# Patient Record
Sex: MALE | Race: WHITE | NOT HISPANIC OR LATINO | Employment: FULL TIME | URBAN - METROPOLITAN AREA
[De-identification: names, ages, dates, MRNs, and addresses within clinical notes are randomized per-mention and may not be internally consistent; named-entity substitution may affect disease eponyms.]

---

## 2019-01-09 ENCOUNTER — TRANSCRIBE ORDERS (OUTPATIENT)
Dept: ADMINISTRATIVE | Facility: HOSPITAL | Age: 49
End: 2019-01-09

## 2019-01-09 DIAGNOSIS — R79.9 ABNORMAL BLOOD CHEMISTRY: Primary | ICD-10-CM

## 2020-09-09 ENCOUNTER — TRANSCRIBE ORDERS (OUTPATIENT)
Dept: ADMINISTRATIVE | Facility: HOSPITAL | Age: 50
End: 2020-09-09

## 2020-09-09 DIAGNOSIS — Z82.49 FAMILY HISTORY OF CHRONIC ISCHEMIC HEART DISEASE: Primary | ICD-10-CM

## 2020-09-09 DIAGNOSIS — E78.00 HIGH CHOLESTEROL: ICD-10-CM

## 2020-09-16 ENCOUNTER — HOSPITAL ENCOUNTER (OUTPATIENT)
Dept: RADIOLOGY | Facility: HOSPITAL | Age: 50
Discharge: HOME | End: 2020-09-16
Attending: SPECIALIST
Payer: COMMERCIAL

## 2020-09-16 DIAGNOSIS — Z82.49 FAMILY HISTORY OF CHRONIC ISCHEMIC HEART DISEASE: ICD-10-CM

## 2020-09-16 DIAGNOSIS — E78.00 HIGH CHOLESTEROL: ICD-10-CM

## 2020-12-02 ENCOUNTER — OFFICE VISIT (OUTPATIENT)
Dept: GASTROENTEROLOGY | Facility: AMBULARY SURGERY CENTER | Age: 50
End: 2020-12-02
Payer: COMMERCIAL

## 2020-12-02 VITALS — HEIGHT: 72 IN | WEIGHT: 271.6 LBS | BODY MASS INDEX: 36.79 KG/M2

## 2020-12-02 DIAGNOSIS — Z12.11 SCREEN FOR COLON CANCER: ICD-10-CM

## 2020-12-02 DIAGNOSIS — K22.89 ESOPHAGEAL THICKENING: Primary | ICD-10-CM

## 2020-12-02 PROCEDURE — 99244 OFF/OP CNSLTJ NEW/EST MOD 40: CPT | Performed by: INTERNAL MEDICINE

## 2020-12-02 RX ORDER — TERAZOSIN 2 MG/1
4 CAPSULE ORAL 2 TIMES DAILY
COMMUNITY
Start: 2020-11-28

## 2020-12-02 RX ORDER — PEN NEEDLE, DIABETIC 31 GX5/16"
NEEDLE, DISPOSABLE MISCELLANEOUS
COMMUNITY
Start: 2020-11-17

## 2020-12-02 RX ORDER — ATORVASTATIN CALCIUM 40 MG/1
TABLET, FILM COATED ORAL
COMMUNITY
Start: 2020-09-29

## 2020-12-02 RX ORDER — AMLODIPINE BESYLATE 5 MG/1
5 TABLET ORAL 2 TIMES DAILY
COMMUNITY
Start: 2020-09-20 | End: 2022-05-02 | Stop reason: HOSPADM

## 2020-12-02 RX ORDER — SODIUM, POTASSIUM,MAG SULFATES 17.5-3.13G
2 SOLUTION, RECONSTITUTED, ORAL ORAL SEE ADMIN INSTRUCTIONS
Qty: 2 BOTTLE | Refills: 0 | Status: SHIPPED | OUTPATIENT
Start: 2020-12-02 | End: 2021-02-15 | Stop reason: ALTCHOICE

## 2020-12-02 RX ORDER — INSULIN GLARGINE 100 [IU]/ML
INJECTION, SOLUTION SUBCUTANEOUS
COMMUNITY
Start: 2020-09-28

## 2020-12-02 RX ORDER — CHLORTHALIDONE 25 MG/1
50 TABLET ORAL DAILY
COMMUNITY
Start: 2020-11-17

## 2020-12-02 RX ORDER — RAMIPRIL 10 MG/1
CAPSULE ORAL
COMMUNITY
Start: 2020-09-04

## 2020-12-02 RX ORDER — ATENOLOL 100 MG/1
150 TABLET ORAL 2 TIMES DAILY
COMMUNITY
Start: 2020-09-22

## 2020-12-02 RX ORDER — CARVEDILOL 25 MG/1
TABLET ORAL
COMMUNITY
Start: 2020-11-01

## 2020-12-03 ENCOUNTER — TELEPHONE (OUTPATIENT)
Dept: GASTROENTEROLOGY | Facility: AMBULARY SURGERY CENTER | Age: 50
End: 2020-12-03

## 2021-01-22 ENCOUNTER — ANESTHESIA EVENT (OUTPATIENT)
Dept: GASTROENTEROLOGY | Facility: AMBULARY SURGERY CENTER | Age: 51
End: 2021-01-22

## 2021-02-04 ENCOUNTER — TELEPHONE (OUTPATIENT)
Dept: GASTROENTEROLOGY | Facility: AMBULARY SURGERY CENTER | Age: 51
End: 2021-02-04

## 2021-02-04 NOTE — TELEPHONE ENCOUNTER
Pt did not hold Pradaxa-per pt's wife   Pt is rescheduled from 2/5/2021 to 2/15/2021 at [facility] w/ dr Deborah Dumas for egd/colonoscopy

## 2021-02-04 NOTE — TELEPHONE ENCOUNTER
Lm for pt and pt's wife re: OK to hold bridgette Delgadillo? RN faxed office note from Dr Florencia Pallas from [facility]   Pt to call back to confirm the message

## 2021-02-04 NOTE — TELEPHONE ENCOUNTER
Spoke to Sandra/RN at Dr Jhonny Vera office  She will fax over office note  Re: Pradaxa hold   Pt has been rescheduled to 2/15/2021 w/ dr Scot Sarabia

## 2021-02-05 ENCOUNTER — ANESTHESIA (OUTPATIENT)
Dept: GASTROENTEROLOGY | Facility: AMBULARY SURGERY CENTER | Age: 51
End: 2021-02-05

## 2021-02-15 ENCOUNTER — HOSPITAL ENCOUNTER (OUTPATIENT)
Dept: GASTROENTEROLOGY | Facility: AMBULARY SURGERY CENTER | Age: 51
Setting detail: OUTPATIENT SURGERY
Discharge: HOME | End: 2021-02-15
Attending: INTERNAL MEDICINE | Admitting: INTERNAL MEDICINE
Payer: COMMERCIAL

## 2021-02-15 VITALS
HEART RATE: 56 BPM | DIASTOLIC BLOOD PRESSURE: 58 MMHG | TEMPERATURE: 96.8 F | RESPIRATION RATE: 18 BRPM | BODY MASS INDEX: 35.62 KG/M2 | OXYGEN SATURATION: 96 % | SYSTOLIC BLOOD PRESSURE: 114 MMHG | HEIGHT: 72 IN | WEIGHT: 263 LBS

## 2021-02-15 VITALS — HEART RATE: 57 BPM

## 2021-02-15 DIAGNOSIS — K29.00 ACUTE SUPERFICIAL GASTRITIS WITHOUT HEMORRHAGE: Primary | ICD-10-CM

## 2021-02-15 DIAGNOSIS — K22.89 ESOPHAGEAL THICKENING: ICD-10-CM

## 2021-02-15 DIAGNOSIS — Z12.11 SCREEN FOR COLON CANCER: ICD-10-CM

## 2021-02-15 PROBLEM — E11.9 DIABETES MELLITUS, TYPE 2 (HCC): Status: ACTIVE | Noted: 2021-02-15

## 2021-02-15 PROBLEM — I48.91 ATRIAL FIBRILLATION (HCC): Status: ACTIVE | Noted: 2021-02-15

## 2021-02-15 PROBLEM — G47.30 SLEEP APNEA: Status: ACTIVE | Noted: 2021-02-15

## 2021-02-15 PROBLEM — I49.9 DYSRHYTHMIAS: Status: ACTIVE | Noted: 2021-02-15

## 2021-02-15 PROBLEM — I10 HTN (HYPERTENSION): Status: ACTIVE | Noted: 2021-02-15

## 2021-02-15 PROCEDURE — 88305 TISSUE EXAM BY PATHOLOGIST: CPT | Performed by: PATHOLOGY

## 2021-02-15 PROCEDURE — 43239 EGD BIOPSY SINGLE/MULTIPLE: CPT | Performed by: INTERNAL MEDICINE

## 2021-02-15 PROCEDURE — 45380 COLONOSCOPY AND BIOPSY: CPT | Performed by: INTERNAL MEDICINE

## 2021-02-15 PROCEDURE — 45385 COLONOSCOPY W/LESION REMOVAL: CPT | Performed by: INTERNAL MEDICINE

## 2021-02-15 RX ORDER — PROPOFOL 10 MG/ML
INJECTION, EMULSION INTRAVENOUS AS NEEDED
Status: DISCONTINUED | OUTPATIENT
Start: 2021-02-15 | End: 2021-02-15

## 2021-02-15 RX ORDER — SODIUM CHLORIDE, SODIUM LACTATE, POTASSIUM CHLORIDE, CALCIUM CHLORIDE 600; 310; 30; 20 MG/100ML; MG/100ML; MG/100ML; MG/100ML
125 INJECTION, SOLUTION INTRAVENOUS CONTINUOUS
Status: DISCONTINUED | OUTPATIENT
Start: 2021-02-15 | End: 2021-02-19 | Stop reason: HOSPADM

## 2021-02-15 RX ORDER — ONDANSETRON 2 MG/ML
4 INJECTION INTRAMUSCULAR; INTRAVENOUS ONCE AS NEEDED
Status: CANCELLED | OUTPATIENT
Start: 2021-02-15

## 2021-02-15 RX ORDER — PANTOPRAZOLE SODIUM 40 MG/1
40 TABLET, DELAYED RELEASE ORAL DAILY
Qty: 30 TABLET | Refills: 3 | Status: SHIPPED | OUTPATIENT
Start: 2021-02-15 | End: 2021-02-16

## 2021-02-15 RX ADMIN — PROPOFOL 50 MG: 10 INJECTION, EMULSION INTRAVENOUS at 08:13

## 2021-02-15 RX ADMIN — PROPOFOL 50 MG: 10 INJECTION, EMULSION INTRAVENOUS at 08:16

## 2021-02-15 RX ADMIN — SODIUM CHLORIDE, SODIUM LACTATE, POTASSIUM CHLORIDE, AND CALCIUM CHLORIDE: .6; .31; .03; .02 INJECTION, SOLUTION INTRAVENOUS at 08:03

## 2021-02-15 RX ADMIN — PROPOFOL 50 MG: 10 INJECTION, EMULSION INTRAVENOUS at 08:18

## 2021-02-15 RX ADMIN — PROPOFOL 50 MG: 10 INJECTION, EMULSION INTRAVENOUS at 08:15

## 2021-02-15 RX ADMIN — PROPOFOL 50 MG: 10 INJECTION, EMULSION INTRAVENOUS at 08:07

## 2021-02-15 RX ADMIN — SODIUM CHLORIDE, SODIUM LACTATE, POTASSIUM CHLORIDE, AND CALCIUM CHLORIDE 125 ML/HR: .6; .31; .03; .02 INJECTION, SOLUTION INTRAVENOUS at 07:47

## 2021-02-15 RX ADMIN — PROPOFOL 50 MG: 10 INJECTION, EMULSION INTRAVENOUS at 08:19

## 2021-02-15 RX ADMIN — PROPOFOL 50 MG: 10 INJECTION, EMULSION INTRAVENOUS at 08:08

## 2021-02-15 RX ADMIN — PROPOFOL 150 MG: 10 INJECTION, EMULSION INTRAVENOUS at 08:06

## 2021-02-15 RX ADMIN — PROPOFOL 50 MG: 10 INJECTION, EMULSION INTRAVENOUS at 08:17

## 2021-02-15 RX ADMIN — PROPOFOL 50 MG: 10 INJECTION, EMULSION INTRAVENOUS at 08:10

## 2021-02-15 RX ADMIN — PROPOFOL 50 MG: 10 INJECTION, EMULSION INTRAVENOUS at 08:14

## 2021-02-15 RX ADMIN — PROPOFOL 50 MG: 10 INJECTION, EMULSION INTRAVENOUS at 08:12

## 2021-02-15 RX ADMIN — PROPOFOL 50 MG: 10 INJECTION, EMULSION INTRAVENOUS at 08:11

## 2021-02-15 RX ADMIN — PROPOFOL 50 MG: 10 INJECTION, EMULSION INTRAVENOUS at 08:09

## 2021-02-15 NOTE — H&P
History and Physical -  [facility]  Concepción Castorena 44 y o  male MRN: [id number]        HPI: 28-year-old male with history of diabetes mellitus, atrial fibrillation was noted to have esophageal thickening on the CT scan  Regular bowel movements  Historical Information   Past Medical History:   Diagnosis Date    [facility]fib ([facility] )     Diabetes mellitus ([facility] )     Hypertension      Past Surgical History:   Procedure Laterality Date    CARDIAC ELECTROPHYSIOLOGY STUDY AND ABLATION      CARDIOVERSION      NOSE SURGERY       Social History   Social History     Substance and Sexual Activity   Alcohol Use Yes    Comment: Rare     Social History     Substance and Sexual Activity   Drug Use Never     Social History     Tobacco Use   Smoking Status Never Smoker   Smokeless Tobacco Former User    Types: Chew     Family History   Problem Relation Age of Onset    Lung cancer Mother        Meds/Allergies     (Not in a hospital admission)      Allergies   Allergen Reactions    Penicillins Other (See Comments)     pt don't know      Sulfa Antibiotics Other (See Comments)     pt don't know       Objective     Blood pressure 132/85, pulse 56, temperature (!) 96 4 °F (35 8 °C), temperature source Temporal, resp  rate 18, height 6' (1 829 m), weight 119 kg (263 lb), SpO2 97 %      PHYSICAL EXAM:    Gen: NAD  CV: S1 & S2 normal, RRR  CHEST: Clear to auscultate  ABD: soft, NT/ND, good bowel sounds  EXT: no edema    ASSESSMENT:     Abnormal esophagus on the CT scan, screening for colon cancer    PLAN:    EGD and colonoscopy

## 2021-02-15 NOTE — ANESTHESIA PREPROCEDURE EVALUATION
Procedure:  COLONOSCOPY  EGD    Relevant Problems   ANESTHESIA (within normal limits)      CARDIO   (+) Atrial fibrillation (HCC)   (+) Dysrhythmias   (+) HTN (hypertension)      ENDO   (+) Diabetes mellitus, type 2 (HCC)      PULMONARY   (+) Sleep apnea        Physical Exam    Airway    Mallampati score: I  TM Distance: >3 FB  Neck ROM: full     Dental   No notable dental hx     Cardiovascular  Rate: normal,     Pulmonary  Breath sounds clear to auscultation,     Other Findings        Anesthesia Plan  ASA Score- 3     Anesthesia Type- IV sedation with anesthesia with ASA Monitors  Additional Monitors:   Airway Plan:           Plan Factors-Exercise tolerance (METS): >4 METS  Chart reviewed  Existing labs reviewed  Patient summary reviewed  Patient is not a current smoker  Induction- intravenous  Postoperative Plan-     Informed Consent- Anesthetic plan and risks discussed with patient  I personally reviewed this patient with the CRNA  Discussed and agreed on the Anesthesia Plan with the CRNA  Ross Snyder

## 2021-02-15 NOTE — ANESTHESIA POSTPROCEDURE EVALUATION
Post-Op Assessment Note    CV Status:  Stable    Pain management: adequate     Mental Status:  Sleepy   Hydration Status:  Euvolemic   PONV Controlled:  Controlled   Airway Patency:  Patent      Post Op Vitals Reviewed: Yes      Staff: Anesthesiologist, CRNA         No complications documented      BP   97/54   Temp (!) 96 8 °F (36 °C) (02/15/21 0827)    Pulse (!) 53 (02/15/21 0827)   Resp 18 (02/15/21 0827)    SpO2 98 % (02/15/21 0827)

## 2021-02-16 DIAGNOSIS — K29.00 ACUTE SUPERFICIAL GASTRITIS WITHOUT HEMORRHAGE: ICD-10-CM

## 2021-02-16 RX ORDER — PANTOPRAZOLE SODIUM 40 MG/1
TABLET, DELAYED RELEASE ORAL
Qty: 90 TABLET | Refills: 1 | Status: SHIPPED | OUTPATIENT
Start: 2021-02-16 | End: 2021-08-17

## 2021-08-17 DIAGNOSIS — K29.00 ACUTE SUPERFICIAL GASTRITIS WITHOUT HEMORRHAGE: ICD-10-CM

## 2021-08-17 RX ORDER — PANTOPRAZOLE SODIUM 40 MG/1
TABLET, DELAYED RELEASE ORAL
Qty: 90 TABLET | Refills: 1 | Status: ON HOLD | OUTPATIENT
Start: 2021-08-17 | End: 2022-04-08 | Stop reason: ALTCHOICE

## 2021-11-15 ENCOUNTER — OFFICE VISIT (OUTPATIENT)
Dept: PODIATRY | Facility: CLINIC | Age: 51
End: 2021-11-15
Payer: COMMERCIAL

## 2021-11-15 VITALS
WEIGHT: 270 LBS | DIASTOLIC BLOOD PRESSURE: 82 MMHG | BODY MASS INDEX: 36.57 KG/M2 | HEIGHT: 72 IN | SYSTOLIC BLOOD PRESSURE: 135 MMHG

## 2021-11-15 DIAGNOSIS — Z79.4 TYPE 2 DIABETES MELLITUS WITH DIABETIC NEUROPATHY, WITH LONG-TERM CURRENT USE OF INSULIN (HCC): ICD-10-CM

## 2021-11-15 DIAGNOSIS — L97.522 ULCER OF LEFT FOOT, WITH FAT LAYER EXPOSED (HCC): Primary | ICD-10-CM

## 2021-11-15 DIAGNOSIS — E11.40 TYPE 2 DIABETES MELLITUS WITH DIABETIC NEUROPATHY, WITH LONG-TERM CURRENT USE OF INSULIN (HCC): ICD-10-CM

## 2021-11-15 PROCEDURE — 11042 DBRDMT SUBQ TIS 1ST 20SQCM/<: CPT | Performed by: PODIATRIST

## 2021-11-15 PROCEDURE — 99203 OFFICE O/P NEW LOW 30 MIN: CPT | Performed by: PODIATRIST

## 2021-11-15 RX ORDER — POTASSIUM CHLORIDE 20 MEQ/1
TABLET, EXTENDED RELEASE ORAL AS NEEDED
COMMUNITY
Start: 2021-11-02

## 2021-11-15 RX ORDER — FUROSEMIDE 40 MG/1
TABLET ORAL AS NEEDED
COMMUNITY
Start: 2021-11-02

## 2021-11-23 ENCOUNTER — OFFICE VISIT (OUTPATIENT)
Dept: PODIATRY | Facility: CLINIC | Age: 51
End: 2021-11-23
Payer: COMMERCIAL

## 2021-11-23 VITALS
BODY MASS INDEX: 36.57 KG/M2 | SYSTOLIC BLOOD PRESSURE: 131 MMHG | WEIGHT: 270 LBS | HEIGHT: 72 IN | HEART RATE: 67 BPM | DIASTOLIC BLOOD PRESSURE: 84 MMHG

## 2021-11-23 DIAGNOSIS — Z79.4 TYPE 2 DIABETES MELLITUS WITH DIABETIC NEUROPATHY, WITH LONG-TERM CURRENT USE OF INSULIN (HCC): ICD-10-CM

## 2021-11-23 DIAGNOSIS — E11.40 TYPE 2 DIABETES MELLITUS WITH DIABETIC NEUROPATHY, WITH LONG-TERM CURRENT USE OF INSULIN (HCC): ICD-10-CM

## 2021-11-23 DIAGNOSIS — L97.522 ULCER OF LEFT FOOT, WITH FAT LAYER EXPOSED (HCC): Primary | ICD-10-CM

## 2021-11-23 PROCEDURE — 11042 DBRDMT SUBQ TIS 1ST 20SQCM/<: CPT | Performed by: PODIATRIST

## 2021-11-23 RX ORDER — FLUTICASONE PROPIONATE 50 MCG
2 SPRAY, SUSPENSION (ML) NASAL AS NEEDED
COMMUNITY

## 2021-11-23 RX ORDER — INSULIN LISPRO-AABC 100 [IU]/ML
INJECTION, SOLUTION SUBCUTANEOUS AS NEEDED
COMMUNITY
Start: 2021-11-19

## 2021-12-02 ENCOUNTER — HOSPITAL ENCOUNTER (OUTPATIENT)
Dept: RADIOLOGY | Facility: HOSPITAL | Age: 51
Discharge: HOME | End: 2021-12-02
Attending: PODIATRIST
Payer: COMMERCIAL

## 2021-12-02 ENCOUNTER — OFFICE VISIT (OUTPATIENT)
Dept: WOUND CARE | Facility: HOSPITAL | Age: 51
End: 2021-12-02
Payer: COMMERCIAL

## 2021-12-02 VITALS
DIASTOLIC BLOOD PRESSURE: 66 MMHG | TEMPERATURE: 96.5 F | BODY MASS INDEX: 35.89 KG/M2 | SYSTOLIC BLOOD PRESSURE: 121 MMHG | HEART RATE: 66 BPM | HEIGHT: 72 IN | WEIGHT: 265 LBS | RESPIRATION RATE: 16 BRPM

## 2021-12-02 DIAGNOSIS — L97.522 ULCER OF LEFT FOOT, WITH FAT LAYER EXPOSED (HCC): Primary | ICD-10-CM

## 2021-12-02 DIAGNOSIS — L97.522 ULCER OF LEFT FOOT, WITH FAT LAYER EXPOSED (HCC): ICD-10-CM

## 2021-12-02 DIAGNOSIS — Z79.4 TYPE 2 DIABETES MELLITUS WITH DIABETIC NEUROPATHY, WITH LONG-TERM CURRENT USE OF INSULIN (HCC): ICD-10-CM

## 2021-12-02 DIAGNOSIS — E11.40 TYPE 2 DIABETES MELLITUS WITH DIABETIC NEUROPATHY, WITH LONG-TERM CURRENT USE OF INSULIN (HCC): ICD-10-CM

## 2021-12-02 PROCEDURE — 99213 OFFICE O/P EST LOW 20 MIN: CPT | Performed by: PODIATRIST

## 2021-12-02 PROCEDURE — 11042 DBRDMT SUBQ TIS 1ST 20SQCM/<: CPT | Performed by: PODIATRIST

## 2021-12-02 PROCEDURE — G0463 HOSPITAL OUTPT CLINIC VISIT: HCPCS | Performed by: PODIATRIST

## 2021-12-02 PROCEDURE — 73630 X-RAY EXAM OF FOOT: CPT

## 2021-12-02 PROCEDURE — 29445 APPL RIGID TOT CNTC LEG CAST: CPT | Performed by: PODIATRIST

## 2021-12-06 ENCOUNTER — OFFICE VISIT (OUTPATIENT)
Dept: WOUND CARE | Facility: HOSPITAL | Age: 51
End: 2021-12-06
Payer: COMMERCIAL

## 2021-12-06 VITALS
TEMPERATURE: 97.1 F | DIASTOLIC BLOOD PRESSURE: 82 MMHG | HEART RATE: 65 BPM | SYSTOLIC BLOOD PRESSURE: 125 MMHG | RESPIRATION RATE: 18 BRPM

## 2021-12-06 DIAGNOSIS — E11.40 TYPE 2 DIABETES MELLITUS WITH DIABETIC NEUROPATHY, WITH LONG-TERM CURRENT USE OF INSULIN (HCC): ICD-10-CM

## 2021-12-06 DIAGNOSIS — Z79.4 TYPE 2 DIABETES MELLITUS WITH DIABETIC NEUROPATHY, WITH LONG-TERM CURRENT USE OF INSULIN (HCC): ICD-10-CM

## 2021-12-06 DIAGNOSIS — L97.522 ULCER OF LEFT FOOT, WITH FAT LAYER EXPOSED (HCC): Primary | ICD-10-CM

## 2021-12-06 PROCEDURE — 29445 APPL RIGID TOT CNTC LEG CAST: CPT | Performed by: PODIATRIST

## 2021-12-06 PROCEDURE — 99213 OFFICE O/P EST LOW 20 MIN: CPT | Performed by: PODIATRIST

## 2021-12-09 ENCOUNTER — OFFICE VISIT (OUTPATIENT)
Dept: WOUND CARE | Facility: HOSPITAL | Age: 51
End: 2021-12-09
Payer: COMMERCIAL

## 2021-12-09 VITALS
HEART RATE: 64 BPM | RESPIRATION RATE: 16 BRPM | SYSTOLIC BLOOD PRESSURE: 120 MMHG | DIASTOLIC BLOOD PRESSURE: 76 MMHG | TEMPERATURE: 97 F

## 2021-12-09 DIAGNOSIS — Z79.4 TYPE 2 DIABETES MELLITUS WITH DIABETIC NEUROPATHY, WITH LONG-TERM CURRENT USE OF INSULIN (HCC): ICD-10-CM

## 2021-12-09 DIAGNOSIS — L97.522 ULCER OF LEFT FOOT, WITH FAT LAYER EXPOSED (HCC): Primary | ICD-10-CM

## 2021-12-09 DIAGNOSIS — E11.40 TYPE 2 DIABETES MELLITUS WITH DIABETIC NEUROPATHY, WITH LONG-TERM CURRENT USE OF INSULIN (HCC): ICD-10-CM

## 2021-12-09 PROCEDURE — 29445 APPL RIGID TOT CNTC LEG CAST: CPT | Performed by: PODIATRIST

## 2021-12-16 ENCOUNTER — OFFICE VISIT (OUTPATIENT)
Dept: WOUND CARE | Facility: HOSPITAL | Age: 51
End: 2021-12-16
Payer: COMMERCIAL

## 2021-12-16 VITALS
HEART RATE: 58 BPM | RESPIRATION RATE: 16 BRPM | TEMPERATURE: 97 F | SYSTOLIC BLOOD PRESSURE: 136 MMHG | DIASTOLIC BLOOD PRESSURE: 77 MMHG

## 2021-12-16 DIAGNOSIS — E11.40 TYPE 2 DIABETES MELLITUS WITH DIABETIC NEUROPATHY, WITH LONG-TERM CURRENT USE OF INSULIN (HCC): ICD-10-CM

## 2021-12-16 DIAGNOSIS — L97.522 ULCER OF LEFT FOOT, WITH FAT LAYER EXPOSED (HCC): Primary | ICD-10-CM

## 2021-12-16 DIAGNOSIS — Z79.4 TYPE 2 DIABETES MELLITUS WITH DIABETIC NEUROPATHY, WITH LONG-TERM CURRENT USE OF INSULIN (HCC): ICD-10-CM

## 2021-12-16 PROCEDURE — G0463 HOSPITAL OUTPT CLINIC VISIT: HCPCS | Performed by: PODIATRIST

## 2021-12-16 PROCEDURE — 99212 OFFICE O/P EST SF 10 MIN: CPT | Performed by: PODIATRIST

## 2021-12-16 PROCEDURE — 99213 OFFICE O/P EST LOW 20 MIN: CPT | Performed by: PODIATRIST

## 2021-12-20 ENCOUNTER — TELEPHONE (OUTPATIENT)
Dept: WOUND CARE | Facility: HOSPITAL | Age: 51
End: 2021-12-20

## 2021-12-30 ENCOUNTER — OFFICE VISIT (OUTPATIENT)
Dept: WOUND CARE | Facility: HOSPITAL | Age: 51
End: 2021-12-30
Payer: COMMERCIAL

## 2021-12-30 VITALS
RESPIRATION RATE: 16 BRPM | DIASTOLIC BLOOD PRESSURE: 78 MMHG | SYSTOLIC BLOOD PRESSURE: 124 MMHG | HEART RATE: 80 BPM | TEMPERATURE: 96.8 F

## 2021-12-30 DIAGNOSIS — E11.40 TYPE 2 DIABETES MELLITUS WITH DIABETIC NEUROPATHY, WITH LONG-TERM CURRENT USE OF INSULIN (HCC): ICD-10-CM

## 2021-12-30 DIAGNOSIS — Z79.4 TYPE 2 DIABETES MELLITUS WITH DIABETIC NEUROPATHY, WITH LONG-TERM CURRENT USE OF INSULIN (HCC): ICD-10-CM

## 2021-12-30 DIAGNOSIS — L97.522 ULCER OF LEFT FOOT, WITH FAT LAYER EXPOSED (HCC): Primary | ICD-10-CM

## 2021-12-30 PROCEDURE — G0463 HOSPITAL OUTPT CLINIC VISIT: HCPCS | Performed by: PODIATRIST

## 2021-12-30 PROCEDURE — 99213 OFFICE O/P EST LOW 20 MIN: CPT | Performed by: PODIATRIST

## 2021-12-30 PROCEDURE — 99212 OFFICE O/P EST SF 10 MIN: CPT | Performed by: PODIATRIST

## 2022-01-05 ENCOUNTER — OFFICE VISIT (OUTPATIENT)
Dept: PODIATRY | Facility: CLINIC | Age: 52
End: 2022-01-05
Payer: COMMERCIAL

## 2022-01-05 VITALS
SYSTOLIC BLOOD PRESSURE: 161 MMHG | HEART RATE: 62 BPM | WEIGHT: 271 LBS | DIASTOLIC BLOOD PRESSURE: 81 MMHG | BODY MASS INDEX: 36.7 KG/M2 | HEIGHT: 72 IN

## 2022-01-05 DIAGNOSIS — L97.522 ULCER OF LEFT FOOT, WITH FAT LAYER EXPOSED (HCC): Primary | ICD-10-CM

## 2022-01-05 DIAGNOSIS — E11.40 TYPE 2 DIABETES MELLITUS WITH DIABETIC NEUROPATHY, WITH LONG-TERM CURRENT USE OF INSULIN (HCC): ICD-10-CM

## 2022-01-05 DIAGNOSIS — Z79.4 TYPE 2 DIABETES MELLITUS WITH DIABETIC NEUROPATHY, WITH LONG-TERM CURRENT USE OF INSULIN (HCC): ICD-10-CM

## 2022-01-05 PROCEDURE — 99213 OFFICE O/P EST LOW 20 MIN: CPT | Performed by: PODIATRIST

## 2022-01-05 NOTE — PROGRESS NOTES
Patient ID: Dianne Luu is a 46 y o  male Date of Birth 1970     Chief Complaint  Chief Complaint   Patient presents with    Follow-up       Allergies  Penicillins and Sulfa antibiotics    Assessment:    No problem-specific Assessment & Plan notes found for this encounter  Diagnoses and all orders for this visit:    Ulcer of left foot, with fat layer exposed ([facility] )    Type 2 diabetes mellitus with diabetic neuropathy, with long-term current use of insulin ([facility] )          Plan:  1  Previous wound care note was reviewed  2  Wound remains healed  Tolerates new DM shoes  3  Instructed skin care and protection  Foot exam bid  4  Okay to return to work on 1/17  He is not allowed to have overtime at this time  5  RA in 2 weeks  Subjective:        HPI  Ed presents for left foot evaluation  He was discharged from wound center  No recurring ulcer  He presents with new DM shoes  Insert was adjusted today because it was rubbing outside left foot  No pain left foot  BS has been better          The following portions of the patient's history were reviewed and updated as appropriate: allergies, current medications, past family history, past medical history, past social history, past surgical history and problem list     PAST MEDICAL HISTORY:  Past Medical History:   Diagnosis Date    A-fib ([place] [address] )     Diabetes mellitus ([place] [address] )     Hypertension        PAST SURGICAL HISTORY:  Past Surgical History:   Procedure Laterality Date    CARDIAC ELECTROPHYSIOLOGY STUDY AND ABLATION      CARDIOVERSION      CATARACT EXTRACTION Bilateral 07/2021    NOSE SURGERY          ALLERGIES:  Penicillins and Sulfa antibiotics    MEDICATIONS:  Current Outpatient Medications   Medication Sig Dispense Refill    amLODIPine (NORVASC) 5 mg tablet Take 5 mg by mouth 2 (two) times a day      atorvastatin (LIPITOR) 40 mg tablet TAKE 1 TABLET BY MOUTH EVERY DAY AT NIGHT      B-D ULTRAFINE III SHORT PEN 31G X 8 MM MISC USE FOR 2 INJECTIONS PER DAY      carvedilol (COREG) 25 mg tablet TAKE 2 TABLETS (50 MG TOTAL) BY MOUTH 2 (TWO) TIMES A DAY WITH MEALS   chlorthalidone 25 mg tablet Take 50 mg by mouth daily      fluticasone (FLONASE) 50 mcg/act nasal spray As directed      furosemide (LASIX) 40 mg tablet as needed      Lantus SoloStar 100 units/mL injection pen INJECT 45 UNTIS IN THE MORNING AND 80 UNTITS AT NIGHT      Lyumjev KwikPen 100 UNIT/ML SOPN       metFORMIN (GLUCOPHAGE) 1000 MG tablet Take 1,000 mg by mouth 2 (two) times a day      potassium chloride (K-DUR,KLOR-CON) 20 mEq tablet as needed      Pradaxa 150 MG capsu Take 150 mg by mouth 2 (two) times a day      ramipril (ALTACE) 10 MG capsule TAKE 2 TABLETS (20 MG TOTAL) IN THE MORNING AND TAKE 1 TABLET (10 MG TOTAL) IN THE EVENING   terazosin (HYTRIN) 2 mg capsule       pantoprazole (PROTONIX) 40 mg tablet TAKE 1 TABLET BY MOUTH EVERY DAY (Patient not taking: Reported on 11/15/2021) 90 tablet 1     No current facility-administered medications for this visit         SOCIAL HISTORY:  Social History     Socioeconomic History    Marital status: /Civil Union     Spouse name: None    Number of children: None    Years of education: None    Highest education level: None   Occupational History    None   Tobacco Use    Smoking status: Never Smoker    Smokeless tobacco: Former User     Types: Chew   Vaping Use    Vaping Use: Never used   Substance and Sexual Activity    Alcohol use: Yes     Comment: Rare    Drug use: Never    Sexual activity: None   Other Topics Concern    None   Social History Narrative    None     Social Determinants of Health     Financial Resource Strain: Not on file   Food Insecurity: Not on file   Transportation Needs: Not on file   Physical Activity: Not on file   Stress: Not on file   Social Connections: Not on file   Intimate Partner Violence: Not on file   Housing Stability: Not on file      Review of Systems Constitutional: Negative for appetite change, chills and fever  Respiratory: Negative for cough and shortness of breath  Cardiovascular: Negative for chest pain  Gastrointestinal: Negative for diarrhea, nausea and vomiting  Neurological: Positive for numbness  Objective:            /81   Pulse 62   Ht 6' (1 829 m) Comment: verbal  Wt 123 kg (271 lb)   BMI 36 75 kg/m²     Physical Exam  Vitals and nursing note reviewed  Constitutional:       General: He is not in acute distress  Appearance: He is obese  He is not ill-appearing  Cardiovascular:      Rate and Rhythm: Normal rate and regular rhythm  Pulses:           Dorsalis pedis pulses are 2+ on the right side and 2+ on the left side  Posterior tibial pulses are 1+ on the right side and 1+ on the left side  Pulmonary:      Effort: Pulmonary effort is normal    Musculoskeletal:         General: No tenderness or signs of injury  Right lower leg: No edema  Left lower leg: No edema  Right foot: No Charcot foot or foot drop  Left foot: No Charcot foot or foot drop  Skin:     General: Skin is warm  Capillary Refill: Capillary refill takes less than 2 seconds  Coloration: Skin is not cyanotic or mottled  Findings: No abscess, ecchymosis or erythema  Nails: There is no clubbing  Comments: Wound remains healed left submet 5  No keratosis  No drainage  No redness  No abscess  No cellulitis  Neurological:      General: No focal deficit present  Mental Status: He is alert and oriented to person, place, and time  Cranial Nerves: No cranial nerve deficit  Motor: No weakness  Coordination: Coordination normal    Psychiatric:         Mood and Affect: Mood normal          Behavior: Behavior normal          Thought Content:  Thought content normal          Judgment: Judgment normal

## 2022-01-06 ENCOUNTER — TELEPHONE (OUTPATIENT)
Dept: PODIATRY | Facility: CLINIC | Age: 52
End: 2022-01-06

## 2022-01-06 NOTE — TELEPHONE ENCOUNTER
Ciaran called, Hector Corral said he cannot return to work with any restrictions  I wrote a new letter for Ed and took out the no overtime  This will be emailed to [email or web address]

## 2022-01-20 ENCOUNTER — OFFICE VISIT (OUTPATIENT)
Dept: PODIATRY | Facility: CLINIC | Age: 52
End: 2022-01-20
Payer: COMMERCIAL

## 2022-01-20 VITALS
DIASTOLIC BLOOD PRESSURE: 93 MMHG | BODY MASS INDEX: 36.75 KG/M2 | HEIGHT: 72 IN | HEART RATE: 102 BPM | SYSTOLIC BLOOD PRESSURE: 149 MMHG

## 2022-01-20 DIAGNOSIS — E11.40 TYPE 2 DIABETES MELLITUS WITH DIABETIC NEUROPATHY, WITH LONG-TERM CURRENT USE OF INSULIN (HCC): Primary | ICD-10-CM

## 2022-01-20 DIAGNOSIS — Z79.4 TYPE 2 DIABETES MELLITUS WITH DIABETIC NEUROPATHY, WITH LONG-TERM CURRENT USE OF INSULIN (HCC): Primary | ICD-10-CM

## 2022-01-20 PROCEDURE — 99213 OFFICE O/P EST LOW 20 MIN: CPT | Performed by: PODIATRIST

## 2022-01-20 NOTE — PROGRESS NOTES
Patient ID: Jaci De Leon is a 46 y o  male Date of Birth 1970     Chief Complaint  Chief Complaint   Patient presents with    Follow Up Wound Care Visit       Allergies  Penicillins and Sulfa antibiotics    Assessment:    No problem-specific Assessment & Plan notes found for this encounter  Diagnoses and all orders for this visit:    Type 2 diabetes mellitus with diabetic neuropathy, with long-term current use of insulin ([place] [address] )            Plan:  1  Previous note was reviewed  Wound remains healed  Tolerates new DM shoes  2  Educated disease prevention and risks related to diabetes  Educated proper daily foot care and exam   Instructed proper skin care / protection and footwear  Instructed to identify any signs of infection and related foot problem  Discussed proper blood glucose control  3  Instructed skin care and protection  Foot exam bid  4  RA in 3 weeks  Subjective:      HPI  Ed presents for left foot evaluation  No recurring ulcer  He presents with DM shoes  No pain left foot  No redness or edema  BS has been better          The following portions of the patient's history were reviewed and updated as appropriate: allergies, current medications, past family history, past medical history, past social history, past surgical history and problem list     PAST MEDICAL HISTORY:  Past Medical History:   Diagnosis Date    A-fib ([place] [address] )     Diabetes mellitus ([place] [address] )     Hypertension        PAST SURGICAL HISTORY:  Past Surgical History:   Procedure Laterality Date    CARDIAC ELECTROPHYSIOLOGY STUDY AND ABLATION      CARDIOVERSION      CATARACT EXTRACTION Bilateral 07/2021    NOSE SURGERY          ALLERGIES:  Penicillins and Sulfa antibiotics    MEDICATIONS:  Current Outpatient Medications   Medication Sig Dispense Refill    amLODIPine (NORVASC) 5 mg tablet Take 5 mg by mouth 2 (two) times a day      atorvastatin (LIPITOR) 40 mg tablet TAKE 1 TABLET BY MOUTH EVERY DAY AT NIGHT  B-D ULTRAFINE III SHORT PEN 31G X 8 MM MISC USE FOR 2 INJECTIONS PER DAY      carvedilol (COREG) 25 mg tablet TAKE 2 TABLETS (50 MG TOTAL) BY MOUTH 2 (TWO) TIMES A DAY WITH MEALS   chlorthalidone 25 mg tablet Take 50 mg by mouth daily      fluticasone (FLONASE) 50 mcg/act nasal spray As directed      furosemide (LASIX) 40 mg tablet as needed      Lantus SoloStar 100 units/mL injection pen INJECT 45 UNTIS IN THE MORNING AND 80 UNTITS AT NIGHT      Lyumjev KwikPen 100 UNIT/ML SOPN       metFORMIN (GLUCOPHAGE) 1000 MG tablet Take 1,000 mg by mouth 2 (two) times a day      potassium chloride (K-DUR,KLOR-CON) 20 mEq tablet as needed      Pradaxa 150 MG capsu Take 150 mg by mouth 2 (two) times a day      ramipril (ALTACE) 10 MG capsule TAKE 2 TABLETS (20 MG TOTAL) IN THE MORNING AND TAKE 1 TABLET (10 MG TOTAL) IN THE EVENING   terazosin (HYTRIN) 2 mg capsule       pantoprazole (PROTONIX) 40 mg tablet TAKE 1 TABLET BY MOUTH EVERY DAY (Patient not taking: Reported on 11/15/2021) 90 tablet 1     No current facility-administered medications for this visit         SOCIAL HISTORY:  Social History     Socioeconomic History    Marital status: /Civil Union     Spouse name: None    Number of children: None    Years of education: None    Highest education level: None   Occupational History    None   Tobacco Use    Smoking status: Never Smoker    Smokeless tobacco: Former User     Types: Chew   Vaping Use    Vaping Use: Never used   Substance and Sexual Activity    Alcohol use: Yes     Comment: Rare    Drug use: Never    Sexual activity: None   Other Topics Concern    None   Social History Narrative    None     Social Determinants of Health     Financial Resource Strain: Not on file   Food Insecurity: Not on file   Transportation Needs: Not on file   Physical Activity: Not on file   Stress: Not on file   Social Connections: Not on file   Intimate Partner Violence: Not on file   Housing Stability: Not on file      Review of Systems   Constitutional: Negative for appetite change, chills and fever  Respiratory: Negative for cough and shortness of breath  Cardiovascular: Negative for chest pain  Gastrointestinal: Negative for diarrhea, nausea and vomiting  Neurological: Positive for numbness  Objective:            /93   Pulse 102   Ht 6' (1 829 m) Comment: verbal  BMI 36 75 kg/m²     Physical Exam  Vitals and nursing note reviewed  Constitutional:       General: He is not in acute distress  Appearance: He is obese  He is not ill-appearing  Cardiovascular:      Rate and Rhythm: Normal rate and regular rhythm  Pulses:           Dorsalis pedis pulses are 2+ on the right side and 2+ on the left side  Posterior tibial pulses are 1+ on the right side and 1+ on the left side  Pulmonary:      Effort: Pulmonary effort is normal    Musculoskeletal:         General: No tenderness or signs of injury  Right lower leg: No edema  Left lower leg: No edema  Right foot: No Charcot foot or foot drop  Left foot: No Charcot foot or foot drop  Skin:     General: Skin is warm  Capillary Refill: Capillary refill takes less than 2 seconds  Coloration: Skin is not cyanotic or mottled  Findings: No abscess, ecchymosis or erythema  Nails: There is no clubbing  Comments: Wound remains healed left submet 5  No keratosis  No drainage  No redness  No abscess  No cellulitis  Neurological:      General: No focal deficit present  Mental Status: He is alert and oriented to person, place, and time  Cranial Nerves: No cranial nerve deficit  Motor: No weakness  Coordination: Coordination normal    Psychiatric:         Mood and Affect: Mood normal          Behavior: Behavior normal          Thought Content:  Thought content normal          Judgment: Judgment normal

## 2022-02-10 ENCOUNTER — OFFICE VISIT (OUTPATIENT)
Dept: PODIATRY | Facility: CLINIC | Age: 52
End: 2022-02-10
Payer: COMMERCIAL

## 2022-02-10 VITALS
DIASTOLIC BLOOD PRESSURE: 83 MMHG | HEART RATE: 67 BPM | WEIGHT: 271 LBS | BODY MASS INDEX: 36.7 KG/M2 | HEIGHT: 72 IN | SYSTOLIC BLOOD PRESSURE: 135 MMHG

## 2022-02-10 DIAGNOSIS — E11.40 TYPE 2 DIABETES MELLITUS WITH DIABETIC NEUROPATHY, WITH LONG-TERM CURRENT USE OF INSULIN (HCC): Primary | ICD-10-CM

## 2022-02-10 DIAGNOSIS — Z79.4 TYPE 2 DIABETES MELLITUS WITH DIABETIC NEUROPATHY, WITH LONG-TERM CURRENT USE OF INSULIN (HCC): Primary | ICD-10-CM

## 2022-02-10 PROCEDURE — 99213 OFFICE O/P EST LOW 20 MIN: CPT | Performed by: PODIATRIST

## 2022-02-10 NOTE — PROGRESS NOTES
Patient ID: Amadeo Isabel is a 46 y o  male Date of Birth 1970     Chief Complaint  Chief Complaint   Patient presents with    Follow-up       Allergies  Penicillins and Sulfa antibiotics    Assessment:    No problem-specific Assessment & Plan notes found for this encounter  Diagnoses and all orders for this visit:    Type 2 diabetes mellitus with diabetic neuropathy, with long-term current use of insulin ([facility] )          Plan:  1  Previous note was reviewed  Wound remains healed  2  Educated disease prevention and risks related to diabetes  Educated proper daily foot care and exam   Instructed proper skin care / protection and footwear  Instructed to identify any signs of infection and related foot problem  Discussed proper blood glucose control  3  Mild keratosis removed  Instructed skin care and protection  Foot exam bid  Continue DM shoes  Discussed possible 5th met head resection depending on the progress  4  RA in 4 weeks  Subjective:      HPI  Ed presents for left foot evaluation  No recurring ulcer  He presents with DM shoes  No pain left foot  No redness or edema  BS is under control  New HbA1c is 6 8            The following portions of the patient's history were reviewed and updated as appropriate: allergies, current medications, past family history, past medical history, past social history, past surgical history and problem list     PAST MEDICAL HISTORY:  Past Medical History:   Diagnosis Date    A-fib ([place] [address] )     Diabetes mellitus ([facility] )     Hypertension        PAST SURGICAL HISTORY:  Past Surgical History:   Procedure Laterality Date    CARDIAC ELECTROPHYSIOLOGY STUDY AND ABLATION      CARDIOVERSION      CATARACT EXTRACTION Bilateral 07/2021    NOSE SURGERY          ALLERGIES:  Penicillins and Sulfa antibiotics    MEDICATIONS:  Current Outpatient Medications   Medication Sig Dispense Refill    amLODIPine (NORVASC) 5 mg tablet Take 5 mg by mouth 2 (two) times a day      atorvastatin (LIPITOR) 40 mg tablet TAKE 1 TABLET BY MOUTH EVERY DAY AT NIGHT      B-D ULTRAFINE III SHORT PEN 31G X 8 MM MISC USE FOR 2 INJECTIONS PER DAY      carvedilol (COREG) 25 mg tablet TAKE 2 TABLETS (50 MG TOTAL) BY MOUTH 2 (TWO) TIMES A DAY WITH MEALS   chlorthalidone 25 mg tablet Take 50 mg by mouth daily      fluticasone (FLONASE) 50 mcg/act nasal spray As directed      furosemide (LASIX) 40 mg tablet as needed      Lantus SoloStar 100 units/mL injection pen INJECT 45 UNTIS IN THE MORNING AND 80 UNTITS AT NIGHT      Lyumjev KwikPen 100 UNIT/ML SOPN       metFORMIN (GLUCOPHAGE) 1000 MG tablet Take 1,000 mg by mouth 2 (two) times a day      potassium chloride (K-DUR,KLOR-CON) 20 mEq tablet as needed      Pradaxa 150 MG capsu Take 150 mg by mouth 2 (two) times a day      ramipril (ALTACE) 10 MG capsule TAKE 2 TABLETS (20 MG TOTAL) IN THE MORNING AND TAKE 1 TABLET (10 MG TOTAL) IN THE EVENING   terazosin (HYTRIN) 2 mg capsule       pantoprazole (PROTONIX) 40 mg tablet TAKE 1 TABLET BY MOUTH EVERY DAY (Patient not taking: Reported on 11/15/2021) 90 tablet 1     No current facility-administered medications for this visit         SOCIAL HISTORY:  Social History     Socioeconomic History    Marital status: /Civil Union     Spouse name: None    Number of children: None    Years of education: None    Highest education level: None   Occupational History    None   Tobacco Use    Smoking status: Never Smoker    Smokeless tobacco: Former User     Types: Chew   Vaping Use    Vaping Use: Never used   Substance and Sexual Activity    Alcohol use: Yes     Comment: Rare    Drug use: Never    Sexual activity: None   Other Topics Concern    None   Social History Narrative    None     Social Determinants of Health     Financial Resource Strain: Not on file   Food Insecurity: Not on file   Transportation Needs: Not on file   Physical Activity: Not on file   Stress: Not on file   Social Connections: Not on file   Intimate Partner Violence: Not on file   Housing Stability: Not on file      Review of Systems   Constitutional: Negative for appetite change, chills and fever  Respiratory: Negative for cough and shortness of breath  Cardiovascular: Negative for chest pain  Gastrointestinal: Negative for diarrhea, nausea and vomiting  Neurological: Positive for numbness  Objective:            /83   Pulse 67   Ht 6' (1 829 m) Comment: ve rbal  Wt 123 kg (271 lb)   BMI 36 75 kg/m²     Physical Exam  Vitals and nursing note reviewed  Constitutional:       General: He is not in acute distress  Appearance: He is obese  He is not ill-appearing  Cardiovascular:      Rate and Rhythm: Normal rate and regular rhythm  Pulses:           Dorsalis pedis pulses are 2+ on the right side and 2+ on the left side  Posterior tibial pulses are 1+ on the right side and 1+ on the left side  Pulmonary:      Effort: Pulmonary effort is normal    Musculoskeletal:         General: No tenderness or signs of injury  Right lower leg: No edema  Left lower leg: No edema  Right foot: No Charcot foot or foot drop  Left foot: No Charcot foot or foot drop  Skin:     General: Skin is warm  Capillary Refill: Capillary refill takes less than 2 seconds  Coloration: Skin is not cyanotic or mottled  Findings: No abscess, ecchymosis or erythema  Nails: There is no clubbing  Comments: Wound remains healed left submet 5  Slight keratosis  No drainage  No redness  No abscess  No cellulitis  Neurological:      General: No focal deficit present  Mental Status: He is alert and oriented to person, place, and time  Cranial Nerves: No cranial nerve deficit  Motor: No weakness        Coordination: Coordination normal    Psychiatric:         Mood and Affect: Mood normal          Behavior: Behavior normal          Thought Content:  Thought content normal          Judgment: Judgment normal

## 2022-03-10 ENCOUNTER — OFFICE VISIT (OUTPATIENT)
Dept: PODIATRY | Facility: CLINIC | Age: 52
End: 2022-03-10
Payer: COMMERCIAL

## 2022-03-10 VITALS
BODY MASS INDEX: 36.7 KG/M2 | HEIGHT: 72 IN | HEART RATE: 69 BPM | WEIGHT: 271 LBS | DIASTOLIC BLOOD PRESSURE: 80 MMHG | SYSTOLIC BLOOD PRESSURE: 135 MMHG

## 2022-03-10 DIAGNOSIS — E11.40 TYPE 2 DIABETES MELLITUS WITH DIABETIC NEUROPATHY, WITH LONG-TERM CURRENT USE OF INSULIN (HCC): ICD-10-CM

## 2022-03-10 DIAGNOSIS — Z79.4 TYPE 2 DIABETES MELLITUS WITH DIABETIC NEUROPATHY, WITH LONG-TERM CURRENT USE OF INSULIN (HCC): ICD-10-CM

## 2022-03-10 DIAGNOSIS — L97.522 ULCER OF LEFT FOOT, WITH FAT LAYER EXPOSED (HCC): Primary | ICD-10-CM

## 2022-03-10 PROCEDURE — 11042 DBRDMT SUBQ TIS 1ST 20SQCM/<: CPT | Performed by: PODIATRIST

## 2022-03-10 NOTE — PROGRESS NOTES
Patient ID: Vera Bacon is a 46 y o  male Date of Birth 1970     Chief Complaint  Chief Complaint   Patient presents with    Follow-up     4 weeks       Allergies  Penicillins and Sulfa antibiotics    Assessment:    No problem-specific Assessment & Plan notes found for this encounter  Diagnoses and all orders for this visit:    Ulcer of left foot, with fat layer exposed ([facility] )  -     Debridement    Type 2 diabetes mellitus with diabetic neuropathy, with long-term current use of insulin ([facility] )          Plan:  1  Previous note was reviewed  He has recurring ulcer left submet 5  Shoe therapy would not be adequate  Discussed options and would plan for 5th met head resection after wound heals  2  Start him on silver alginate  Knee scooter and orthowedge shoe for off-loading  Recommended NWB  3  Educated disease prevention and risks related to diabetes  4  RA in 1 week  Debridement Procedure:  After  Verbal Consent was obtained and time out performed  Wound located left submet 5 was  excisionally Surgical- Subcutaneous  (CPT: 58273) debrided using scapel  Pre-debridement wound measures 0 cm x 0 cm x 0 cm  Pain was controlled by Lack of sensation due to Neuropathy   Post debridement measurement 1 cm x 0 6 cm x 0 2 cm for a total of 0 6 square centimeters, with wound appearing Fresh bleeding tissue, viable and granular  100%  of wound debrided  Tissue debrided includes depth of Epidermis, Dermis and Subcutaneous with devitalized tissue being debrided including Hyperkeratosis, Slough and Fibrous  with a small amount of bleeding bleeding was noted during procedure  Hemostasis was achieved using pressure  Pain noted during procedure rated as 0  Pain noted after procedure rated as 0  Procedure was Well tolerated by patient  Subjective:      [facility]  Ed presents with his wife for left foot evaluation  No drainage or redness  He noticed some callus and tenderness  He presents with DM shoes  He is back to work with no restriction  BS is under control  The following portions of the patient's history were reviewed and updated as appropriate: allergies, current medications, past family history, past medical history, past social history, past surgical history and problem list     PAST MEDICAL HISTORY:  Past Medical History:   Diagnosis Date    A-fib ([facility] )     Diabetes mellitus ([facility] )     Hypertension        PAST SURGICAL HISTORY:  Past Surgical History:   Procedure Laterality Date    CARDIAC ELECTROPHYSIOLOGY STUDY AND ABLATION      CARDIOVERSION      CATARACT EXTRACTION Bilateral 07/2021    NOSE SURGERY          ALLERGIES:  Penicillins and Sulfa antibiotics    MEDICATIONS:  Current Outpatient Medications   Medication Sig Dispense Refill    amLODIPine (NORVASC) 5 mg tablet Take 5 mg by mouth 2 (two) times a day      atorvastatin (LIPITOR) 40 mg tablet TAKE 1 TABLET BY MOUTH EVERY DAY AT NIGHT      B-D ULTRAFINE III SHORT PEN 31G X 8 MM MISC USE FOR 2 INJECTIONS PER DAY      carvedilol (COREG) 25 mg tablet TAKE 2 TABLETS (50 MG TOTAL) BY MOUTH 2 (TWO) TIMES A DAY WITH MEALS   chlorthalidone 25 mg tablet Take 50 mg by mouth daily      fluticasone (FLONASE) 50 mcg/act nasal spray As directed      furosemide (LASIX) 40 mg tablet as needed      Lantus SoloStar 100 units/mL injection pen INJECT 45 UNTIS IN THE MORNING AND 80 UNTITS AT NIGHT      Lyumjev KwikPen 100 UNIT/ML SOPN       metFORMIN (GLUCOPHAGE) 1000 MG tablet Take 1,000 mg by mouth 2 (two) times a day      potassium chloride (K-DUR,KLOR-CON) 20 mEq tablet as needed      Pradaxa 150 MG capsu Take 150 mg by mouth 2 (two) times a day      ramipril (ALTACE) 10 MG capsule TAKE 2 TABLETS (20 MG TOTAL) IN THE MORNING AND TAKE 1 TABLET (10 MG TOTAL) IN THE EVENING        terazosin (HYTRIN) 2 mg capsule       pantoprazole (PROTONIX) 40 mg tablet TAKE 1 TABLET BY MOUTH EVERY DAY (Patient not taking: Reported on 11/15/2021) 90 tablet 1     No current facility-administered medications for this visit  SOCIAL HISTORY:  Social History     Socioeconomic History    Marital status: /Civil Union     Spouse name: None    Number of children: None    Years of education: None    Highest education level: None   Occupational History    None   Tobacco Use    Smoking status: Never Smoker    Smokeless tobacco: Former User     Types: Chew   Vaping Use    Vaping Use: Never used   Substance and Sexual Activity    Alcohol use: Yes     Comment: Rare    Drug use: Never    Sexual activity: None   Other Topics Concern    None   Social History Narrative    None     Social Determinants of Health     Financial Resource Strain: Not on file   Food Insecurity: Not on file   Transportation Needs: Not on file   Physical Activity: Not on file   Stress: Not on file   Social Connections: Not on file   Intimate Partner Violence: Not on file   Housing Stability: Not on file      Review of Systems   Constitutional: Negative for appetite change, chills and fever  Respiratory: Negative for cough and shortness of breath  Cardiovascular: Negative for chest pain  Gastrointestinal: Negative for diarrhea, nausea and vomiting  Neurological: Positive for numbness  Objective:            /80   Pulse 69   Ht 6' (1 829 m) Comment: verbal  Wt 123 kg (271 lb)   BMI 36 75 kg/m²     Physical Exam  Vitals and nursing note reviewed  Constitutional:       General: He is not in acute distress  Appearance: He is obese  He is not ill-appearing  Cardiovascular:      Rate and Rhythm: Normal rate and regular rhythm  Pulses:           Dorsalis pedis pulses are 2+ on the right side and 2+ on the left side  Posterior tibial pulses are 1+ on the right side and 1+ on the left side  Pulmonary:      Effort: Pulmonary effort is normal    Musculoskeletal:         General: No tenderness or signs of injury  Right lower leg: No edema  Left lower leg: No edema  Right foot: No Charcot foot or foot drop  Left foot: No Charcot foot or foot drop  Skin:     General: Skin is warm  Capillary Refill: Capillary refill takes less than 2 seconds  Coloration: Skin is not cyanotic or mottled  Findings: No abscess, ecchymosis or erythema  Nails: There is no clubbing  Comments: Keratosis left submet 5  Full thickness ulcer presents under the callus  No deep probing  No redness  No abscess or infection  Mild epidermolysis lateral left 5th met head  Neurological:      General: No focal deficit present  Mental Status: He is alert and oriented to person, place, and time  Cranial Nerves: No cranial nerve deficit  Motor: No weakness  Coordination: Coordination normal    Psychiatric:         Mood and Affect: Mood normal          Behavior: Behavior normal          Thought Content:  Thought content normal          Judgment: Judgment normal

## 2022-03-11 ENCOUNTER — TELEPHONE (OUTPATIENT)
Dept: PODIATRY | Facility: CLINIC | Age: 52
End: 2022-03-11

## 2022-03-14 ENCOUNTER — TELEPHONE (OUTPATIENT)
Dept: PODIATRY | Facility: CLINIC | Age: 52
End: 2022-03-14

## 2022-03-14 NOTE — TELEPHONE ENCOUNTER
Patient called  He was in to see Dr Parisa Morrison on Thursday  He now needs a note to be put out of work until further notice  He needs this ASAP  Can we email it to the patient? [email or web address] is his email  Thank you!

## 2022-03-18 ENCOUNTER — OFFICE VISIT (OUTPATIENT)
Dept: PODIATRY | Facility: CLINIC | Age: 52
End: 2022-03-18
Payer: COMMERCIAL

## 2022-03-18 VITALS
BODY MASS INDEX: 36.7 KG/M2 | DIASTOLIC BLOOD PRESSURE: 83 MMHG | SYSTOLIC BLOOD PRESSURE: 135 MMHG | WEIGHT: 271 LBS | HEIGHT: 72 IN | HEART RATE: 58 BPM

## 2022-03-18 DIAGNOSIS — L97.522 ULCER OF LEFT FOOT, WITH FAT LAYER EXPOSED (HCC): Primary | ICD-10-CM

## 2022-03-18 DIAGNOSIS — Z79.4 TYPE 2 DIABETES MELLITUS WITH DIABETIC NEUROPATHY, WITH LONG-TERM CURRENT USE OF INSULIN (HCC): ICD-10-CM

## 2022-03-18 DIAGNOSIS — E11.40 TYPE 2 DIABETES MELLITUS WITH DIABETIC NEUROPATHY, WITH LONG-TERM CURRENT USE OF INSULIN (HCC): ICD-10-CM

## 2022-03-18 PROCEDURE — 99213 OFFICE O/P EST LOW 20 MIN: CPT | Performed by: PODIATRIST

## 2022-03-18 NOTE — PROGRESS NOTES
Patient ID: Estelle Giraldo is a 46 y o  male Date of Birth 1970     Chief Complaint  Chief Complaint   Patient presents with    Follow Up Wound Care Visit     1 week       Allergies  Penicillins and Sulfa antibiotics    Assessment:    No problem-specific Assessment & Plan notes found for this encounter  Diagnoses and all orders for this visit:    Ulcer of left foot, with fat layer exposed ([facility] )    Type 2 diabetes mellitus with diabetic neuropathy, with long-term current use of insulin ([facility] )          Plan:  1  Previous note was reviewed  Wound is matt well  Continue local care  2  Okay to use antibiotic ointment and DSD now  Knee scooter and orthowedge shoe for off-loading  Recommended NWB  3  Educated disease prevention and risks related to diabetes  4  Will plan OR for 5th met head resection  5  RA in 2 weeks  Subjective:      HPI  Ed presents with his wife for left foot evaluation  He feels well  No pain  No drainage in the last two days  BS is under control  He presents with knee scooter          The following portions of the patient's history were reviewed and updated as appropriate: allergies, current medications, past family history, past medical history, past social history, past surgical history and problem list     PAST MEDICAL HISTORY:  Past Medical History:   Diagnosis Date    A-fib ([place] [address] )     Diabetes mellitus ([facility] )     Hypertension        PAST SURGICAL HISTORY:  Past Surgical History:   Procedure Laterality Date    CARDIAC ELECTROPHYSIOLOGY STUDY AND ABLATION      CARDIOVERSION      CATARACT EXTRACTION Bilateral 07/2021    NOSE SURGERY          ALLERGIES:  Penicillins and Sulfa antibiotics    MEDICATIONS:  Current Outpatient Medications   Medication Sig Dispense Refill    amLODIPine (NORVASC) 5 mg tablet Take 5 mg by mouth 2 (two) times a day      atorvastatin (LIPITOR) 40 mg tablet TAKE 1 TABLET BY MOUTH EVERY DAY AT NIGHT      B-D ULTRAFINE III SHORT PEN 31G X 8 MM MISC USE FOR 2 INJECTIONS PER DAY      carvedilol (COREG) 25 mg tablet TAKE 2 TABLETS (50 MG TOTAL) BY MOUTH 2 (TWO) TIMES A DAY WITH MEALS   chlorthalidone 25 mg tablet Take 50 mg by mouth daily      fluticasone (FLONASE) 50 mcg/act nasal spray As directed      furosemide (LASIX) 40 mg tablet as needed      Lantus SoloStar 100 units/mL injection pen INJECT 45 UNTIS IN THE MORNING AND 80 UNTITS AT NIGHT      Lyumjev KwikPen 100 UNIT/ML SOPN       metFORMIN (GLUCOPHAGE) 1000 MG tablet Take 1,000 mg by mouth 2 (two) times a day      potassium chloride (K-DUR,KLOR-CON) 20 mEq tablet as needed      Pradaxa 150 MG capsu Take 150 mg by mouth 2 (two) times a day      ramipril (ALTACE) 10 MG capsule TAKE 2 TABLETS (20 MG TOTAL) IN THE MORNING AND TAKE 1 TABLET (10 MG TOTAL) IN THE EVENING   terazosin (HYTRIN) 2 mg capsule       pantoprazole (PROTONIX) 40 mg tablet TAKE 1 TABLET BY MOUTH EVERY DAY (Patient not taking: Reported on 11/15/2021) 90 tablet 1     No current facility-administered medications for this visit         SOCIAL HISTORY:  Social History     Socioeconomic History    Marital status: /Civil Union     Spouse name: None    Number of children: None    Years of education: None    Highest education level: None   Occupational History    None   Tobacco Use    Smoking status: Never Smoker    Smokeless tobacco: Former User     Types: Chew   Vaping Use    Vaping Use: Never used   Substance and Sexual Activity    Alcohol use: Yes     Comment: Rare    Drug use: Never    Sexual activity: None   Other Topics Concern    None   Social History Narrative    None     Social Determinants of Health     Financial Resource Strain: Not on file   Food Insecurity: Not on file   Transportation Needs: Not on file   Physical Activity: Not on file   Stress: Not on file   Social Connections: Not on file   Intimate Partner Violence: Not on file   Housing Stability: Not on file Review of Systems   Constitutional: Negative for appetite change, chills and fever  Respiratory: Negative for cough and shortness of breath  Cardiovascular: Negative for chest pain  Gastrointestinal: Negative for diarrhea, nausea and vomiting  Neurological: Positive for numbness  Objective:            /83   Pulse 58   Ht 6' (1 829 m) Comment: verbal  Wt 123 kg (271 lb)   BMI 36 75 kg/m²     Physical Exam  Vitals reviewed  Constitutional:       General: He is not in acute distress  Appearance: He is obese  He is not ill-appearing  Cardiovascular:      Rate and Rhythm: Normal rate and regular rhythm  Pulses:           Dorsalis pedis pulses are 2+ on the right side and 2+ on the left side  Posterior tibial pulses are 1+ on the right side and 1+ on the left side  Pulmonary:      Effort: Pulmonary effort is normal    Musculoskeletal:         General: No tenderness or signs of injury  Right lower leg: No edema  Left lower leg: No edema  Right foot: No Charcot foot or foot drop  Left foot: No Charcot foot or foot drop  Skin:     General: Skin is warm  Capillary Refill: Capillary refill takes less than 2 seconds  Coloration: Skin is not cyanotic or mottled  Findings: No abscess, ecchymosis or erythema  Nails: There is no clubbing  Comments: Ulcer is mostly healed left submet 5  It is 0 2 X 0 2 cm  No depth  Mild keratosis presents  No infection  Neurological:      General: No focal deficit present  Mental Status: He is alert and oriented to person, place, and time  Cranial Nerves: No cranial nerve deficit  Motor: No weakness  Coordination: Coordination normal    Psychiatric:         Mood and Affect: Mood normal          Behavior: Behavior normal          Thought Content:  Thought content normal          Judgment: Judgment normal

## 2022-03-21 ENCOUNTER — TELEPHONE (OUTPATIENT)
Dept: PODIATRY | Facility: CLINIC | Age: 52
End: 2022-03-21

## 2022-03-21 NOTE — TELEPHONE ENCOUNTER
Call placed to patient and LM in regards to scheduling surgery with Dr Margarita Duran (per Dr Margarita Duran)  Will await call back

## 2022-03-22 ENCOUNTER — PREP FOR PROCEDURE (OUTPATIENT)
Dept: PODIATRY | Facility: CLINIC | Age: 52
End: 2022-03-22

## 2022-03-22 DIAGNOSIS — Z01.818 PRE-OP TESTING: ICD-10-CM

## 2022-03-22 DIAGNOSIS — M21.6X2 OTHER ACQUIRED DEFORMITIES OF LEFT FOOT: ICD-10-CM

## 2022-03-22 DIAGNOSIS — L97.522 ULCER OF LEFT FOOT, WITH FAT LAYER EXPOSED (HCC): Primary | ICD-10-CM

## 2022-03-25 ENCOUNTER — APPOINTMENT (OUTPATIENT)
Dept: LAB | Facility: CLINIC | Age: 52
End: 2022-03-25
Payer: COMMERCIAL

## 2022-03-25 DIAGNOSIS — N18.31 CHRONIC KIDNEY DISEASE (CKD) STAGE G3A/A1, MODERATELY DECREASED GLOMERULAR FILTRATION RATE (GFR) BETWEEN 45-59 ML/MIN/1.73 SQUARE METER AND ALBUMINURIA CREATININE RATIO LESS THAN 30 MG/G (HCC): ICD-10-CM

## 2022-03-25 DIAGNOSIS — L97.522 ULCER OF LEFT FOOT, WITH FAT LAYER EXPOSED (HCC): ICD-10-CM

## 2022-03-25 DIAGNOSIS — Z01.818 PRE-OP TESTING: ICD-10-CM

## 2022-03-25 DIAGNOSIS — R80.1 PERSISTENT PROTEINURIA: ICD-10-CM

## 2022-03-25 LAB
ALBUMIN SERPL BCP-MCNC: 3.9 G/DL (ref 3.5–5)
ALP SERPL-CCNC: 86 U/L (ref 46–116)
ALT SERPL W P-5'-P-CCNC: 37 U/L (ref 12–78)
ANION GAP SERPL CALCULATED.3IONS-SCNC: 8 MMOL/L (ref 4–13)
AST SERPL W P-5'-P-CCNC: 26 U/L (ref 5–45)
BASOPHILS # BLD AUTO: 0.04 THOUSANDS/ΜL (ref 0–0.1)
BASOPHILS NFR BLD AUTO: 1 % (ref 0–1)
BILIRUB SERPL-MCNC: 0.67 MG/DL (ref 0.2–1)
BUN SERPL-MCNC: 32 MG/DL (ref 5–25)
CALCIUM SERPL-MCNC: 8.7 MG/DL (ref 8.3–10.1)
CHLORIDE SERPL-SCNC: 100 MMOL/L (ref 100–108)
CO2 SERPL-SCNC: 31 MMOL/L (ref 21–32)
CREAT SERPL-MCNC: 1.47 MG/DL (ref 0.6–1.3)
CRP SERPL QL: <3 MG/L
EOSINOPHIL # BLD AUTO: 0.23 THOUSAND/ΜL (ref 0–0.61)
EOSINOPHIL NFR BLD AUTO: 4 % (ref 0–6)
ERYTHROCYTE [DISTWIDTH] IN BLOOD BY AUTOMATED COUNT: 13.3 % (ref 11.6–15.1)
ERYTHROCYTE [SEDIMENTATION RATE] IN BLOOD: 2 MM/HOUR (ref 0–19)
FERRITIN SERPL-MCNC: 82 NG/ML (ref 8–388)
GFR SERPL CREATININE-BSD FRML MDRD: 53 ML/MIN/1.73SQ M
GLUCOSE P FAST SERPL-MCNC: 145 MG/DL (ref 65–99)
HCT VFR BLD AUTO: 40 % (ref 36.5–49.3)
HGB BLD-MCNC: 13.7 G/DL (ref 12–17)
IMM GRANULOCYTES # BLD AUTO: 0.02 THOUSAND/UL (ref 0–0.2)
IMM GRANULOCYTES NFR BLD AUTO: 0 % (ref 0–2)
IRON SATN MFR SERPL: 22 % (ref 20–50)
IRON SERPL-MCNC: 73 UG/DL (ref 65–175)
LYMPHOCYTES # BLD AUTO: 1.52 THOUSANDS/ΜL (ref 0.6–4.47)
LYMPHOCYTES NFR BLD AUTO: 24 % (ref 14–44)
MAGNESIUM SERPL-MCNC: 1.8 MG/DL (ref 1.6–2.6)
MCH RBC QN AUTO: 28.9 PG (ref 26.8–34.3)
MCHC RBC AUTO-ENTMCNC: 34.3 G/DL (ref 31.4–37.4)
MCV RBC AUTO: 84 FL (ref 82–98)
MONOCYTES # BLD AUTO: 0.48 THOUSAND/ΜL (ref 0.17–1.22)
MONOCYTES NFR BLD AUTO: 8 % (ref 4–12)
NEUTROPHILS # BLD AUTO: 3.94 THOUSANDS/ΜL (ref 1.85–7.62)
NEUTS SEG NFR BLD AUTO: 63 % (ref 43–75)
NRBC BLD AUTO-RTO: 0 /100 WBCS
PLATELET # BLD AUTO: 165 THOUSANDS/UL (ref 149–390)
PMV BLD AUTO: 12 FL (ref 8.9–12.7)
POTASSIUM SERPL-SCNC: 4 MMOL/L (ref 3.5–5.3)
PROT SERPL-MCNC: 7.3 G/DL (ref 6.4–8.2)
RBC # BLD AUTO: 4.74 MILLION/UL (ref 3.88–5.62)
SODIUM SERPL-SCNC: 139 MMOL/L (ref 136–145)
TIBC SERPL-MCNC: 328 UG/DL (ref 250–450)
WBC # BLD AUTO: 6.23 THOUSAND/UL (ref 4.31–10.16)

## 2022-03-25 PROCEDURE — 80053 COMPREHEN METABOLIC PANEL: CPT

## 2022-03-25 PROCEDURE — 86140 C-REACTIVE PROTEIN: CPT

## 2022-03-25 PROCEDURE — 85652 RBC SED RATE AUTOMATED: CPT

## 2022-03-25 PROCEDURE — 83540 ASSAY OF IRON: CPT

## 2022-03-25 PROCEDURE — 85025 COMPLETE CBC W/AUTO DIFF WBC: CPT

## 2022-03-25 PROCEDURE — 83735 ASSAY OF MAGNESIUM: CPT

## 2022-03-25 PROCEDURE — 36415 COLL VENOUS BLD VENIPUNCTURE: CPT

## 2022-03-25 PROCEDURE — 83550 IRON BINDING TEST: CPT

## 2022-03-25 PROCEDURE — 82728 ASSAY OF FERRITIN: CPT

## 2022-03-29 RX ORDER — CHLORHEXIDINE GLUCONATE 0.12 MG/ML
15 RINSE ORAL ONCE
Status: CANCELLED | OUTPATIENT
Start: 2022-04-08

## 2022-03-29 RX ORDER — CHLORHEXIDINE GLUCONATE 4 G/100ML
SOLUTION TOPICAL DAILY PRN
Status: CANCELLED | OUTPATIENT
Start: 2022-04-08

## 2022-03-31 ENCOUNTER — OFFICE VISIT (OUTPATIENT)
Dept: PODIATRY | Facility: CLINIC | Age: 52
End: 2022-03-31
Payer: COMMERCIAL

## 2022-03-31 VITALS
HEIGHT: 72 IN | DIASTOLIC BLOOD PRESSURE: 75 MMHG | HEART RATE: 60 BPM | SYSTOLIC BLOOD PRESSURE: 124 MMHG | BODY MASS INDEX: 36.75 KG/M2

## 2022-03-31 DIAGNOSIS — E11.40 TYPE 2 DIABETES MELLITUS WITH DIABETIC NEUROPATHY, WITH LONG-TERM CURRENT USE OF INSULIN (HCC): ICD-10-CM

## 2022-03-31 DIAGNOSIS — Z79.4 TYPE 2 DIABETES MELLITUS WITH DIABETIC NEUROPATHY, WITH LONG-TERM CURRENT USE OF INSULIN (HCC): ICD-10-CM

## 2022-03-31 DIAGNOSIS — L97.522 ULCER OF LEFT FOOT, WITH FAT LAYER EXPOSED (HCC): Primary | ICD-10-CM

## 2022-03-31 PROCEDURE — 99213 OFFICE O/P EST LOW 20 MIN: CPT | Performed by: PODIATRIST

## 2022-03-31 NOTE — PROGRESS NOTES
Patient ID: Evelia Dai is a 46 y o  male Date of Birth 1970     Chief Complaint  Chief Complaint   Patient presents with    Follow-up       Allergies  Penicillins and Sulfa antibiotics    Assessment:    No problem-specific Assessment & Plan notes found for this encounter  Diagnoses and all orders for this visit:    Ulcer of left foot, with fat layer exposed ([facility] )    Type 2 diabetes mellitus with diabetic neuropathy, with long-term current use of insulin ([facility] )          Plan:  1  Previous note was reviewed  Wound is healed     2  Knee scooter and orthowedge shoe for off-loading  Recommended NWB  3  Educated disease prevention and risks related to diabetes  4  Reviewed previous X-ray  Explained surgical details and post-op course  Discussed all risks and complications related to the patient's condition and surgery  The benefits of surgery were also discussed  The patient understood that the surgery would not guarantee desirable outcome  All questions and concerns were addressed and the consent was signed  5  OR in 1 week  Subjective:      HPI  Ed presents with his wife for left foot evaluation  He feels well  No pain  No drainage  BS is under control  He presents with knee scooter          The following portions of the patient's history were reviewed and updated as appropriate: allergies, current medications, past family history, past medical history, past social history, past surgical history and problem list     PAST MEDICAL HISTORY:  Past Medical History:   Diagnosis Date    A-fib ([facility] )     Diabetes mellitus ([facility] )     Hypertension        PAST SURGICAL HISTORY:  Past Surgical History:   Procedure Laterality Date    CARDIAC ELECTROPHYSIOLOGY STUDY AND ABLATION      CARDIOVERSION      CATARACT EXTRACTION Bilateral 07/2021    NOSE SURGERY          ALLERGIES:  Penicillins and Sulfa antibiotics    MEDICATIONS:  Current Outpatient Medications   Medication Sig Dispense Refill    amLODIPine (NORVASC) 5 mg tablet Take 5 mg by mouth 2 (two) times a day      atorvastatin (LIPITOR) 40 mg tablet TAKE 1 TABLET BY MOUTH EVERY DAY AT NIGHT      B-D ULTRAFINE III SHORT PEN 31G X 8 MM MISC USE FOR 2 INJECTIONS PER DAY      carvedilol (COREG) 25 mg tablet TAKE 2 TABLETS (50 MG TOTAL) BY MOUTH 2 (TWO) TIMES A DAY WITH MEALS   chlorthalidone 25 mg tablet Take 50 mg by mouth daily      fluticasone (FLONASE) 50 mcg/act nasal spray As directed      furosemide (LASIX) 40 mg tablet as needed      Lantus SoloStar 100 units/mL injection pen INJECT 45 UNTIS IN THE MORNING AND 80 UNTITS AT NIGHT      Lyumjev KwikPen 100 UNIT/ML SOPN       metFORMIN (GLUCOPHAGE) 1000 MG tablet Take 1,000 mg by mouth 2 (two) times a day      pantoprazole (PROTONIX) 40 mg tablet TAKE 1 TABLET BY MOUTH EVERY DAY 90 tablet 1    potassium chloride (K-DUR,KLOR-CON) 20 mEq tablet as needed      Pradaxa 150 MG capsu Take 150 mg by mouth 2 (two) times a day      ramipril (ALTACE) 10 MG capsule TAKE 2 TABLETS (20 MG TOTAL) IN THE MORNING AND TAKE 1 TABLET (10 MG TOTAL) IN THE EVENING   terazosin (HYTRIN) 2 mg capsule        No current facility-administered medications for this visit         SOCIAL HISTORY:  Social History     Socioeconomic History    Marital status: /Civil Union     Spouse name: None    Number of children: None    Years of education: None    Highest education level: None   Occupational History    None   Tobacco Use    Smoking status: Never Smoker    Smokeless tobacco: Former User     Types: Chew   Vaping Use    Vaping Use: Never used   Substance and Sexual Activity    Alcohol use: Yes     Comment: Rare    Drug use: Never    Sexual activity: None   Other Topics Concern    None   Social History Narrative    None     Social Determinants of Health     Financial Resource Strain: Not on file   Food Insecurity: Not on file   Transportation Needs: Not on file   Physical Activity: Not on file   Stress: Not on file   Social Connections: Not on file   Intimate Partner Violence: Not on file   Housing Stability: Not on file      Review of Systems   Constitutional: Negative for appetite change, chills and fever  Respiratory: Negative for cough and shortness of breath  Cardiovascular: Negative for chest pain  Gastrointestinal: Negative for diarrhea, nausea and vomiting  Neurological: Positive for numbness  Objective:            /75   Pulse 60   Ht 6' (1 829 m) Comment: verbal  BMI 36 75 kg/m²     Physical Exam  Vitals reviewed  Constitutional:       General: He is not in acute distress  Appearance: He is obese  He is not ill-appearing  Cardiovascular:      Rate and Rhythm: Normal rate and regular rhythm  Pulses:           Dorsalis pedis pulses are 2+ on the right side and 2+ on the left side  Posterior tibial pulses are 1+ on the right side and 1+ on the left side  Pulmonary:      Effort: Pulmonary effort is normal    Musculoskeletal:         General: No tenderness or signs of injury  Right lower leg: No edema  Left lower leg: No edema  Right foot: No Charcot foot or foot drop  Left foot: No Charcot foot or foot drop  Skin:     General: Skin is warm  Capillary Refill: Capillary refill takes less than 2 seconds  Coloration: Skin is not cyanotic or mottled  Findings: No abscess, ecchymosis or erythema  Nails: There is no clubbing  Comments: Ulcer is healed left submet 5  No redness or edema  No infection  Neurological:      General: No focal deficit present  Mental Status: He is alert and oriented to person, place, and time  Cranial Nerves: No cranial nerve deficit  Motor: No weakness  Coordination: Coordination normal    Psychiatric:         Mood and Affect: Mood normal          Behavior: Behavior normal          Thought Content:  Thought content normal          Judgment: Judgment normal

## 2022-04-01 ENCOUNTER — ANESTHESIA EVENT (OUTPATIENT)
Dept: PERIOP | Facility: HOSPITAL | Age: 52
End: 2022-04-01
Payer: COMMERCIAL

## 2022-04-05 NOTE — PRE-PROCEDURE INSTRUCTIONS
Pre-Surgery Instructions:   Medication Instructions    amLODIPine (NORVASC) 5 mg tablet Instructed patient to continue taking as prescribed up to and including DOS with sips of water   atorvastatin (LIPITOR) 40 mg tablet Instructed patient to continue taking as prescribed up to and including DOS with sips of water   carvedilol (COREG) 25 mg tablet Instructed patient to continue taking as prescribed up to and including DOS with sips of water   chlorthalidone 25 mg tablet Instructed patient to continue taking as prescribed up to but NOT including DOS   fluticasone (FLONASE) 50 mcg/act nasal spray Instructed patient to continue taking as prescribed up to and including DOS    furosemide (LASIX) 40 mg tablet Instructed patient to continue taking as prescribed up to but NOT including DOS   Lantus SoloStar 100 units/mL injection pen Instructed patient to continue taking as prescribed    Lyumjev KwikPen 100 UNIT/ML SOPN Instructed patient to continue taking as prescribed up to but NOT including DOS   metFORMIN (GLUCOPHAGE) 1000 MG tablet Instructed patient to continue taking as prescribed up to but NOT including DOS   pantoprazole (PROTONIX) 40 mg tablet Instructed patient to continue taking as prescribed up to and including DOS with sips of water   potassium chloride (K-DUR,KLOR-CON) 20 mEq tablet Instructed patient to continue taking as prescribed up to but NOT including DOS   Pradaxa 150 MG capsu Pt was instructed by Dr Brady Kaur to hold x 3 days prior to sx   ramipril (ALTACE) 10 MG capsule Instructed patient to continue taking as prescribed up to but NOT including DOS   terazosin (HYTRIN) 2 mg capsule Instructed patient to continue taking as prescribed up to but NOT including DOS  Med list reviewed as above  Also instructed pt not to start any new vitamins/supplements preoperatively and to avoid NSAID's  3 days prior to surgery  Tylenol is acceptable if needed      Pt has and/or is getting CHG surgical soap and verbalizes understanding of preoperative showering protocol  Reviewed [facility] current covid  policy and pt understands that it may change at any time  All information within "My Surgical Experience" pamphlet reviewed and patient verbalizes understanding and compliance  All questions answered

## 2022-04-08 ENCOUNTER — APPOINTMENT (OUTPATIENT)
Dept: RADIOLOGY | Facility: HOSPITAL | Age: 52
End: 2022-04-08
Payer: COMMERCIAL

## 2022-04-08 ENCOUNTER — HOSPITAL ENCOUNTER (OUTPATIENT)
Facility: HOSPITAL | Age: 52
Setting detail: OUTPATIENT SURGERY
Discharge: HOME | End: 2022-04-08
Attending: PODIATRIST | Admitting: PODIATRIST
Payer: COMMERCIAL

## 2022-04-08 ENCOUNTER — ANESTHESIA (OUTPATIENT)
Dept: PERIOP | Facility: HOSPITAL | Age: 52
End: 2022-04-08
Payer: COMMERCIAL

## 2022-04-08 VITALS
SYSTOLIC BLOOD PRESSURE: 129 MMHG | HEIGHT: 72 IN | OXYGEN SATURATION: 95 % | RESPIRATION RATE: 12 BRPM | HEART RATE: 52 BPM | BODY MASS INDEX: 35.76 KG/M2 | TEMPERATURE: 97.5 F | WEIGHT: 264 LBS | DIASTOLIC BLOOD PRESSURE: 85 MMHG

## 2022-04-08 DIAGNOSIS — M21.6X2 OTHER ACQUIRED DEFORMITIES OF LEFT FOOT: ICD-10-CM

## 2022-04-08 DIAGNOSIS — L97.522 ULCER OF LEFT FOOT, WITH FAT LAYER EXPOSED (HCC): ICD-10-CM

## 2022-04-08 DIAGNOSIS — G89.18 ACUTE POST-OPERATIVE PAIN: Primary | ICD-10-CM

## 2022-04-08 LAB
GLUCOSE SERPL-MCNC: 108 MG/DL (ref 65–140)
GLUCOSE SERPL-MCNC: 92 MG/DL (ref 65–140)

## 2022-04-08 PROCEDURE — 82948 REAGENT STRIP/BLOOD GLUCOSE: CPT

## 2022-04-08 PROCEDURE — NC001 PR NO CHARGE: Performed by: PODIATRIST

## 2022-04-08 PROCEDURE — 28113 PART REMOVAL OF METATARSAL: CPT | Performed by: PODIATRIST

## 2022-04-08 PROCEDURE — 73630 X-RAY EXAM OF FOOT: CPT

## 2022-04-08 PROCEDURE — 88311 DECALCIFY TISSUE: CPT | Performed by: PATHOLOGY

## 2022-04-08 PROCEDURE — 88304 TISSUE EXAM BY PATHOLOGIST: CPT | Performed by: PATHOLOGY

## 2022-04-08 RX ORDER — SODIUM CHLORIDE, SODIUM LACTATE, POTASSIUM CHLORIDE, CALCIUM CHLORIDE 600; 310; 30; 20 MG/100ML; MG/100ML; MG/100ML; MG/100ML
INJECTION, SOLUTION INTRAVENOUS CONTINUOUS PRN
Status: DISCONTINUED | OUTPATIENT
Start: 2022-04-08 | End: 2022-04-08

## 2022-04-08 RX ORDER — EPHEDRINE SULFATE 50 MG/ML
INJECTION INTRAVENOUS AS NEEDED
Status: DISCONTINUED | OUTPATIENT
Start: 2022-04-08 | End: 2022-04-08

## 2022-04-08 RX ORDER — FENTANYL CITRATE/PF 50 MCG/ML
25 SYRINGE (ML) INJECTION
Status: DISCONTINUED | OUTPATIENT
Start: 2022-04-08 | End: 2022-04-08 | Stop reason: HOSPADM

## 2022-04-08 RX ORDER — CLINDAMYCIN PHOSPHATE 900 MG/50ML
900 INJECTION INTRAVENOUS ONCE
Status: COMPLETED | OUTPATIENT
Start: 2022-04-08 | End: 2022-04-08

## 2022-04-08 RX ORDER — MAGNESIUM HYDROXIDE 1200 MG/15ML
LIQUID ORAL AS NEEDED
Status: DISCONTINUED | OUTPATIENT
Start: 2022-04-08 | End: 2022-04-08 | Stop reason: HOSPADM

## 2022-04-08 RX ORDER — OXYCODONE HYDROCHLORIDE AND ACETAMINOPHEN 5; 325 MG/1; MG/1
1 TABLET ORAL EVERY 6 HOURS PRN
Qty: 20 TABLET | Refills: 0 | Status: SHIPPED | OUTPATIENT
Start: 2022-04-08 | End: 2022-04-18

## 2022-04-08 RX ORDER — ONDANSETRON 2 MG/ML
INJECTION INTRAMUSCULAR; INTRAVENOUS AS NEEDED
Status: DISCONTINUED | OUTPATIENT
Start: 2022-04-08 | End: 2022-04-08

## 2022-04-08 RX ORDER — CHLORHEXIDINE GLUCONATE 4 G/100ML
SOLUTION TOPICAL DAILY PRN
Status: DISCONTINUED | OUTPATIENT
Start: 2022-04-08 | End: 2022-04-08 | Stop reason: HOSPADM

## 2022-04-08 RX ORDER — FENTANYL CITRATE 50 UG/ML
INJECTION, SOLUTION INTRAMUSCULAR; INTRAVENOUS AS NEEDED
Status: DISCONTINUED | OUTPATIENT
Start: 2022-04-08 | End: 2022-04-08

## 2022-04-08 RX ORDER — PROPOFOL 10 MG/ML
INJECTION, EMULSION INTRAVENOUS AS NEEDED
Status: DISCONTINUED | OUTPATIENT
Start: 2022-04-08 | End: 2022-04-08

## 2022-04-08 RX ORDER — ONDANSETRON 2 MG/ML
4 INJECTION INTRAMUSCULAR; INTRAVENOUS EVERY 6 HOURS PRN
Status: DISCONTINUED | OUTPATIENT
Start: 2022-04-08 | End: 2022-04-08 | Stop reason: HOSPADM

## 2022-04-08 RX ORDER — CHLORHEXIDINE GLUCONATE 0.12 MG/ML
15 RINSE ORAL ONCE
Status: DISCONTINUED | OUTPATIENT
Start: 2022-04-08 | End: 2022-04-08 | Stop reason: HOSPADM

## 2022-04-08 RX ORDER — MIDAZOLAM HYDROCHLORIDE 2 MG/2ML
INJECTION, SOLUTION INTRAMUSCULAR; INTRAVENOUS AS NEEDED
Status: DISCONTINUED | OUTPATIENT
Start: 2022-04-08 | End: 2022-04-08

## 2022-04-08 RX ORDER — OXYCODONE HYDROCHLORIDE 5 MG/1
5 TABLET ORAL EVERY 4 HOURS PRN
Status: DISCONTINUED | OUTPATIENT
Start: 2022-04-08 | End: 2022-04-08 | Stop reason: HOSPADM

## 2022-04-08 RX ORDER — ACETAMINOPHEN 325 MG/1
650 TABLET ORAL EVERY 4 HOURS PRN
Status: DISCONTINUED | OUTPATIENT
Start: 2022-04-08 | End: 2022-04-08 | Stop reason: HOSPADM

## 2022-04-08 RX ORDER — LIDOCAINE HYDROCHLORIDE 10 MG/ML
INJECTION, SOLUTION EPIDURAL; INFILTRATION; INTRACAUDAL; PERINEURAL AS NEEDED
Status: DISCONTINUED | OUTPATIENT
Start: 2022-04-08 | End: 2022-04-08

## 2022-04-08 RX ADMIN — EPHEDRINE SULFATE 10 MG: 50 INJECTION INTRAVENOUS at 12:54

## 2022-04-08 RX ADMIN — EPHEDRINE SULFATE 10 MG: 50 INJECTION INTRAVENOUS at 12:48

## 2022-04-08 RX ADMIN — SODIUM CHLORIDE, SODIUM LACTATE, POTASSIUM CHLORIDE, AND CALCIUM CHLORIDE: .6; .31; .03; .02 INJECTION, SOLUTION INTRAVENOUS at 13:03

## 2022-04-08 RX ADMIN — CLINDAMYCIN IN 5 PERCENT DEXTROSE 900 MG: 18 INJECTION, SOLUTION INTRAVENOUS at 12:20

## 2022-04-08 RX ADMIN — EPHEDRINE SULFATE 5 MG: 50 INJECTION INTRAVENOUS at 12:35

## 2022-04-08 RX ADMIN — EPHEDRINE SULFATE 5 MG: 50 INJECTION INTRAVENOUS at 12:39

## 2022-04-08 RX ADMIN — EPHEDRINE SULFATE 5 MG: 50 INJECTION INTRAVENOUS at 12:45

## 2022-04-08 RX ADMIN — FENTANYL CITRATE 50 MCG: 50 INJECTION, SOLUTION INTRAMUSCULAR; INTRAVENOUS at 12:16

## 2022-04-08 RX ADMIN — SODIUM CHLORIDE, SODIUM LACTATE, POTASSIUM CHLORIDE, AND CALCIUM CHLORIDE: .6; .31; .03; .02 INJECTION, SOLUTION INTRAVENOUS at 12:11

## 2022-04-08 RX ADMIN — EPHEDRINE SULFATE 5 MG: 50 INJECTION INTRAVENOUS at 12:41

## 2022-04-08 RX ADMIN — ONDANSETRON 4 MG: 2 INJECTION INTRAMUSCULAR; INTRAVENOUS at 12:59

## 2022-04-08 RX ADMIN — PROPOFOL 200 MG: 10 INJECTION, EMULSION INTRAVENOUS at 12:16

## 2022-04-08 RX ADMIN — FENTANYL CITRATE 25 MCG: 50 INJECTION, SOLUTION INTRAMUSCULAR; INTRAVENOUS at 12:34

## 2022-04-08 RX ADMIN — MIDAZOLAM 2 MG: 1 INJECTION INTRAMUSCULAR; INTRAVENOUS at 12:09

## 2022-04-08 RX ADMIN — LIDOCAINE HYDROCHLORIDE 50 MG: 10 INJECTION, SOLUTION EPIDURAL; INFILTRATION; INTRACAUDAL; PERINEURAL at 12:16

## 2022-04-08 RX ADMIN — EPHEDRINE SULFATE 5 MG: 50 INJECTION INTRAVENOUS at 12:37

## 2022-04-08 NOTE — DISCHARGE INSTRUCTIONS
Dr Iqbal Outhouse Instructions    1  Take your prescribed medication as directed  2  Upon arrival at home, lie down and elevate your surgical foot on 2 pillows  3  Remain quiet, off your feet as much as possible, for the first 24-48 hours  This is when your feet first swell and may become painful  After 48 hours you may begin limited walking following these restrictions:   Weightbear as tolerated to surgical foot with surgical shoe at all times  4  Drink large quantities of water and citrus fruit juice  Consume no alcohol  Continue a well-balanced diet  5  Report any unusual discomfort or fever to this office  6  A limited amount of discomfort and swelling is to be expected  In some cases the skin may take on a bruised appearance  The surgical solution that was applied to your foot prior to the operation is dark in color and the operation site may appear to be oozing when it actually is not  7  A slight amount of blood is to be expected, and is no cause for alarm  Do not remove the dressings  If there is active bleeding and if the bleeding persists, add additional gauze to the bandage, apply direct pressure, elevate your feet and call this office  8  Do not get the dressings wet  As regular bathing may be inconvenient, sponge baths are recommended  9  When anesthesia wears off and if any discomfort should be present, apply an ice pack directly over the operated area for 15 minute intervals for several hours or until the pain leaves  (USE IN EXCESS OF 15 MINUTES COULD CAUSE FROSTBITE)  Do not use hot water bags or electric pads  A convenient icepack can be made by placing ice cubes in a plastic bag and covering this with a towel  10  If necessary, take a mild laxative before retiring  11  Wear your special open shoes anytime you put weight on your foot, even if it is just to walk to the bathroom and back  It will probably be 2 or 3 weeks before you will be permitted to try regular shoes    12  Having performed the operation, we are interested in a prompt recovery  Please cooperate by following the above instructions  13  Please call to confirm your post-op appointment or call with any other questions

## 2022-04-08 NOTE — ANESTHESIA POSTPROCEDURE EVALUATION
Post-Op Assessment Note    CV Status:  Stable  Pain Score: 0    Pain management: adequate     Mental Status:  Awake and arousable   Hydration Status:  Stable   PONV Controlled:  None   Airway Patency:  Patent      Post Op Vitals Reviewed: Yes      Staff: CRNA         No complications documented      BP   117/77   Temp   98F   Pulse  57   Resp   12   SpO2   96% [facility]

## 2022-04-08 NOTE — ANESTHESIA PREPROCEDURE EVALUATION
Procedure:  RESECTION LEFT 5TH METATARSAL (Left Foot)    Relevant Problems   CARDIO   (+) Atrial fibrillation (HCC)   (+) Dysrhythmias   (+) HTN (hypertension)      ENDO   (+) Diabetes mellitus, type 2 (HCC)      PULMONARY   (+) Sleep apnea     Hx BPH  GERD  Hx COVID infection  BMI 36  afib s/p cardioversion 1/2022         Physical Exam    Airway    Mallampati score: III  TM Distance: >3 FB  Neck ROM: full     Dental   No notable dental hx     Cardiovascular      Pulmonary      Other Findings        Anesthesia Plan  ASA Score- 3     Anesthesia Type- general with ASA Monitors  Additional Monitors:   Airway Plan: LMA  Plan Factors-    Chart reviewed  Existing labs reviewed  Patient summary reviewed  Patient is not a current smoker  Patient did not smoke on day of surgery  Induction- intravenous  Postoperative Plan-   Planned trial extubation    Informed Consent- Anesthetic plan and risks discussed with patient  I personally reviewed this patient with the CRNA  Discussed and agreed on the Anesthesia Plan with the CRNA  Mehnaz Fairbanks

## 2022-04-08 NOTE — DISCHARGE SUMMARY
Discharge Summary Outpatient Procedure Podiatry -   Rhonda Jones 46 y o  male MRN: [id number]  Unit/Bed#: OR POOL Encounter: [id number]    Admission Date: 4/8/2022     Admitting Diagnosis: Ulcer of left foot, with fat layer exposed ([facility] [address] ) [L97 522]  Other acquired deformities of left foot [M21 6X2]    Discharge Diagnosis: same    Procedures Performed: RESECTION LEFT 5TH METATARSAL:     Complications: none    Condition at Discharge: stable    Discharge instructions/Information to patient and family:   See after visit summary for information provided to patient and family  Provisions for Follow-Up Care/Important appointments:  See after visit summary for information related to follow-up care and any pertinent home health orders  Discharge Medications:  See after visit summary for reconciled discharge medications provided to patient and family

## 2022-04-08 NOTE — OP NOTE
OPERATIVE REPORT - Podiatry  PATIENT NAME: Joselyn Ribeiro    :  1970  MRN: [id number]  Pt Location:  OR ROOM 03    SURGERY DATE: 2022    Surgeon(s) and Role:     * Isaura Coulter DPM - Primary     * Shakira Rowley DPM - Assisting    Pre-op Diagnosis:  Ulcer of left foot, with fat layer exposed ([facility] [address] ) [L97 522]  Other acquired deformities of left foot [M21 6X2]    Post-Op Diagnosis Codes:     * Ulcer of left foot, with fat layer exposed ([facility] [address] ) [L97 522]     * Other acquired deformities of left foot [M21 6X2]    Procedure(s) (LRB):  RESECTION LEFT 5TH METATARSAL (Left)    Specimen(s):  ID Type Source Tests Collected by Time Destination   1 : 5th Metatarsal Head Tissue Foot, Left TISSUE EXAM Isaura Coulter DPM 2022 1233        Estimated Blood Loss:   Minimal    Drains:  * No LDAs found *    Anesthesia Type:   Choice with 10 ml of 1% Lidocaine and 0 5% Bupivacaine in a 1:1 mixture    Hemostasis:  -18 in pneumatic ankle tourniquet inflated to 250 mmHg for approximately 29 minutes  -manual compression  -atraumatic technique    Materials:  * No implants in log *  4-0 Vicryl  4-0 nylon    Operative Findings:  Consistent with diagnosis    Complications:   None    Procedure and Technique:     Under mild sedation, the patient was brought into the operating room and placed on the operating room table in the supine position  IV sedation was achieved by anesthesia team and a universal timeout was performed where all parties are in agreement of correct patient, correct procedure and correct site  A pneumatic tourniquet was then placed over the patient's left lower extremity with ample padding  A reversed Ramos block was performed consisting of 10 ml of 1% Lidocaine and 0 5% Bupivacaine in a 1:1 mixture  The foot was then prepped and draped in the usual aseptic manner  An esmarch bandage was used to exsangunate the foot and the pneumatic tourniquet was then inflated to 250mmHg      Attention was then directed to the patient's left foot  A linear incision approximately 3 cm in length was made just lateral to the 5th extensor digitorum longus tendon  This incision was carefully carried to periosteum/capsule using a surgical blade  The periosteal and capsular structures were reflected both medially and laterally to expose the patient's 5th metatarsal head  Soft tissue structures were freed from the distal aspect of the 5th metatarsal both medially and laterally using a surgical blade  The bone appeared to be white in color with completely intact articular cartilage at the 5th metatarsal head, the bone was hard when touched with instrumentation  These are signs of healthy bone  Sagittal saw was then utilized to make a through and through cut at the most distal aspect of the 5th metatarsal shaft in a distal medial to proximal lateral direction in order to best offload and reduce pressure to the patient's plantar foot  The capital fragment was then carefully removed from the foot and passed off the field for pathological examination  The incision site was then examined and was found to be free of any devitalized, fibrotic, or otherwise unhealthy tissue  The incision site was then irrigated with a bulb syringe and normal sterile saline  Capsular and periosteal structures were then closed with 4-0 Vicryl suture  Subcutaneous layers were closed with 4-0 Vicryl suture  Skin was reapproximated utilizing 4-0 nylon in horizontal mattress and simple interrupted technique  Foot was then cleansed and dried the incision site was dressed with Betadine-soaked Adaptic, 4 x 4 gauze, Kerlix, and Coban  The tourniquet was deflated at approximately 29 min and normal hyperemic response was noted to all digits  The patient tolerated the procedure and anesthesia well without immediate complications and transferred to PACU with vital signs stable       As with many limb salvage procedures, we contemplate the possibility of performing further stages to this procedure  Procedures may include debridements, delayed closure, plastic surgery techniques, or more proximal amputations  This procedure may be considered part of a multi-staged limb salvage treatment plan  Dr Kaitlin Powers was present during the entire procedure and participated in all key aspects  Ivy Rey DPM  DATE: April 8, 2022  TIME: 1:12 PM      Portions of the record may have been created with voice recognition software  Occasional wrong word or "sound a like" substitutions may have occurred due to the inherent limitations of voice recognition software  Read the chart carefully and recognize, using context, where substitutions have occurred

## 2022-04-12 ENCOUNTER — TELEPHONE (OUTPATIENT)
Dept: PODIATRY | Facility: CLINIC | Age: 52
End: 2022-04-12

## 2022-04-12 NOTE — TELEPHONE ENCOUNTER
Patient called office stating that he is experiencing increased pain and discomfort over surgical incision over the last 24 hours  Patient also states that bandage moved  States that "when he was in the hospital he could see 4 toes outside of bandage and now he can not see any toes "  Please contact patient and advise

## 2022-04-14 ENCOUNTER — OFFICE VISIT (OUTPATIENT)
Dept: PODIATRY | Facility: CLINIC | Age: 52
End: 2022-04-14

## 2022-04-14 VITALS
HEART RATE: 71 BPM | BODY MASS INDEX: 35.8 KG/M2 | HEIGHT: 72 IN | SYSTOLIC BLOOD PRESSURE: 129 MMHG | DIASTOLIC BLOOD PRESSURE: 78 MMHG

## 2022-04-14 DIAGNOSIS — Z98.890 POST-OPERATIVE STATE: ICD-10-CM

## 2022-04-14 DIAGNOSIS — L97.522 ULCER OF LEFT FOOT, WITH FAT LAYER EXPOSED (HCC): Primary | ICD-10-CM

## 2022-04-14 DIAGNOSIS — Z79.4 TYPE 2 DIABETES MELLITUS WITH DIABETIC NEUROPATHY, WITH LONG-TERM CURRENT USE OF INSULIN (HCC): ICD-10-CM

## 2022-04-14 DIAGNOSIS — E11.40 TYPE 2 DIABETES MELLITUS WITH DIABETIC NEUROPATHY, WITH LONG-TERM CURRENT USE OF INSULIN (HCC): ICD-10-CM

## 2022-04-14 PROCEDURE — 99024 POSTOP FOLLOW-UP VISIT: CPT | Performed by: PODIATRIST

## 2022-04-14 RX ORDER — CEPHALEXIN 500 MG/1
500 CAPSULE ORAL EVERY 6 HOURS SCHEDULED
Qty: 28 CAPSULE | Refills: 0 | Status: SHIPPED | OUTPATIENT
Start: 2022-04-14 | End: 2022-04-21 | Stop reason: SDUPTHER

## 2022-04-14 NOTE — PROGRESS NOTES
PATIENT:  Arcelia Wilson      1970      ASSESSMENT     1  Ulcer of left foot, with fat layer exposed ([address] )     2  Type 2 diabetes mellitus with diabetic neuropathy, with long-term current use of insulin ([address] )     3  Post-operative state  XR foot 3+ vw left    cephalexin (KEFLEX) 500 mg capsule          PLAN  Sutures left intact  Incision was cleaned with betadine and DSD applied to be kept C/D/I  Continue post-op care as instructed  Stressed on patient compliance about proper off-loading, staying off of feet, and elevation  X-ray was taken  Normal post-op findings from X-ray  He has more edema to left foot than expected post-operatively  Probably he has swelling in left foot because he was not elevating  There is no reason to think he has infection based on exam, however, will still use Keflex for prophylaxis because he has high risk patient  Call if any increase in pain, signs of infection, fevers, calf pain, shortness of breath, or general distress is noted  Patient instructed to go to ER if call is not returned immediately  Pt to be seen on Tuesday next week as scheduled  HISTORY OF PRESENT ILLNESS  Patient presents for post-op appointment  His pain was fine about 1-2 days after surgery  Then, he had increased pain on Tuesday and his dressing was shifted  He had to change the dressing and felt better immediately  Pain is minimal at this time  His BS has been very good  It was 109 this morning  He feels well without constitutional signs of infection  REVIEW OF SYSTEMS  GENERAL: No fever or chills  HEART: No chest pain, or palpitation  RESPIRATORY:  No SOB or cough  GI: No Nausea, vomit or diarrhea  NEUROLOGIC: No syncope or acute weakness  MUSCULOSKELETAL: No calf pain or edema  PHYSICAL EXAMINATION  GENERAL  The patient appears in NAD / non-toxic  Afebrile  VSS    VASCULAR EXAM  Pedal pulses and vascular status are intact  No calf pain or edema bilaterally  No cyanosis  DERMATOLOGIC EXAM  Incision is coapted and healing well  No drainage  No necrosis or dehiscence  Mild to moderate edema left lateral foot  No significant redness around the incision and left foot  NEUROLOGIC EXAM  AAO X 3  No focal neurologic deficit  Neurologic status is intact BLE  MUSCULOSKELETAL EXAM  ROM intact  No fluctuation or crepitus left foot

## 2022-04-19 ENCOUNTER — OFFICE VISIT (OUTPATIENT)
Dept: PODIATRY | Facility: CLINIC | Age: 52
End: 2022-04-19
Payer: COMMERCIAL

## 2022-04-19 ENCOUNTER — APPOINTMENT (OUTPATIENT)
Dept: LAB | Facility: HOSPITAL | Age: 52
End: 2022-04-19
Attending: PODIATRIST
Payer: COMMERCIAL

## 2022-04-19 VITALS — BODY MASS INDEX: 35.76 KG/M2 | WEIGHT: 264 LBS | HEIGHT: 72 IN

## 2022-04-19 DIAGNOSIS — M96.840 POSTOPERATIVE HEMATOMA OF MUSCULOSKELETAL STRUCTURE FOLLOWING MUSCULOSKELETAL PROCEDURE: Primary | ICD-10-CM

## 2022-04-19 PROCEDURE — 87070 CULTURE OTHR SPECIMN AEROBIC: CPT | Performed by: PODIATRIST

## 2022-04-19 PROCEDURE — 87205 SMEAR GRAM STAIN: CPT | Performed by: PODIATRIST

## 2022-04-19 PROCEDURE — 10180 I&D COMPLEX PO WOUND INFCTJ: CPT | Performed by: PODIATRIST

## 2022-04-19 PROCEDURE — G0180 MD CERTIFICATION HHA PATIENT: HCPCS | Performed by: PODIATRIST

## 2022-04-19 PROCEDURE — 87186 SC STD MICRODIL/AGAR DIL: CPT | Performed by: PODIATRIST

## 2022-04-19 NOTE — PROGRESS NOTES
PATIENT:  Concepción Castorena      1970      ASSESSMENT     1  Postoperative hematoma of musculoskeletal structure following musculoskeletal procedure  Wound culture and Gram stain    Ambulatory Referral to Home Health          PLAN  Decreased swelling in general   There is a concern about possible infection vs seroma vs hematoma under the incision  Discussed options and the decision was made to perform bedside I&D  See procedure  Attention was directed to the left foot  A formal timeout including patient identification, laterality and existing allergies using [facility] protocol was conducted  Betadine solution was utilized to prep the area  Under aseptic technique, sutures were removed from proximal incision  Then, #15 scalpel was used to incise the overlying skin  About 2-3 ml of serosanguinous exudate was expressed  No purulent drainage noted  A culture was obtained and sent to the hospital lab  Some particles from bone wax were removed as well  The wound was copiously lavaged with normal sterile saline  Wound was packed open with 1/4" iodoform gauze with compression dressing  Face to face encounter was done for VNA to start wound care  Clinically, there was no purulent drainage  Most likely hematoma / seroma  Will try to rule out infection  Awaits wound culture  Continue antibiotics  Wound closure with tertiary intention vs revision of the surgical site in the OR depending on the progress  Continue NWB  Rest, compression, and elevation  HISTORY OF PRESENT ILLNESS  Patient presents for post-op appointment  Pain is minimal   Tolerates antibiotics well  His BS is under control  He feels well without constitutional signs of infection  REVIEW OF SYSTEMS  GENERAL: No fever or chills  HEART: No chest pain, or palpitation  RESPIRATORY:  No SOB or cough  GI: No Nausea, vomit or diarrhea  NEUROLOGIC: No syncope or acute weakness  MUSCULOSKELETAL: No calf pain or edema  PHYSICAL EXAMINATION  GENERAL  The patient appears in NAD / non-toxic  Afebrile  VSS    VASCULAR EXAM  Pedal pulses and vascular status are intact  No calf pain or edema bilaterally  No cyanosis  DERMATOLOGIC EXAM  Incision is coapted  There is small amount of serous drainage around proximal incision  Mild fluctuation presents under the proximal incision without purulence  No necrosis  No significant redness left foot  NEUROLOGIC EXAM  AAO X 3  No focal neurologic deficit  Neurologic status is intact BLE  MUSCULOSKELETAL EXAM  ROM intact  MMT intact  Decreased edema left foot

## 2022-04-21 ENCOUNTER — DOCUMENTATION (OUTPATIENT)
Dept: SOCIAL WORK | Facility: HOSPITAL | Age: 52
End: 2022-04-21

## 2022-04-21 ENCOUNTER — OFFICE VISIT (OUTPATIENT)
Dept: PODIATRY | Facility: CLINIC | Age: 52
End: 2022-04-21

## 2022-04-21 VITALS
HEIGHT: 72 IN | BODY MASS INDEX: 35.8 KG/M2 | SYSTOLIC BLOOD PRESSURE: 125 MMHG | HEART RATE: 76 BPM | DIASTOLIC BLOOD PRESSURE: 80 MMHG

## 2022-04-21 DIAGNOSIS — Z98.890 POST-OPERATIVE STATE: ICD-10-CM

## 2022-04-21 DIAGNOSIS — M96.840 POSTOPERATIVE HEMATOMA OF MUSCULOSKELETAL STRUCTURE FOLLOWING MUSCULOSKELETAL PROCEDURE: Primary | ICD-10-CM

## 2022-04-21 PROCEDURE — 99024 POSTOP FOLLOW-UP VISIT: CPT | Performed by: PODIATRIST

## 2022-04-21 RX ORDER — CEPHALEXIN 500 MG/1
500 CAPSULE ORAL EVERY 6 HOURS SCHEDULED
Qty: 28 CAPSULE | Refills: 0 | Status: SHIPPED | OUTPATIENT
Start: 2022-04-21 | End: 2022-04-23

## 2022-04-21 NOTE — PROGRESS NOTES
PATIENT:  Joselyn Rbieiro      1970      ASSESSMENT     1  Postoperative hematoma of musculoskeletal structure following musculoskeletal procedure     2  Post-operative state  cephalexin (KEFLEX) 500 mg capsule         PLAN  Wound looks clean now  Continue local care with packing per VNA  Awaits wound culture result  Extend Keflex for now until wound culture is available  Discussed possible need for wash out and revision of incision depending on the progress  Reviewed / discussed pathology report  HISTORY OF PRESENT ILLNESS  Patient presents for wound check  Pain is minimal   Tolerates antibiotics well  His BS is under control  REVIEW OF SYSTEMS  GENERAL: No fever or chills  HEART: No chest pain, or palpitation  RESPIRATORY:  No SOB or cough  GI: No Nausea, vomit or diarrhea  NEUROLOGIC: No syncope or acute weakness  MUSCULOSKELETAL: No calf pain or edema  PHYSICAL EXAMINATION  GENERAL  The patient appears in NAD / non-toxic  Afebrile  VSS    VASCULAR EXAM  Pedal pulses and vascular status are intact  No calf pain or edema bilaterally  No cyanosis  DERMATOLOGIC EXAM  Wound looks clean and granular  Decreased in depth  No purulence  Redness / edema resolved left foot  NEUROLOGIC EXAM  AAO X 3  No focal neurologic deficit  Neurologic status is intact BLE  MUSCULOSKELETAL EXAM  ROM intact  MMT intact  Decreased edema left foot

## 2022-04-21 NOTE — PROGRESS NOTES
Admission Report at [facility] has admitted your patient to [address] [place] [address] [facility] service with the following disciplines:      SN  This report is informational only, no responses is needed  Primary focus of home health care wound care   Integumentary assess  Patient stated goals of care for wound to heal without complications  Anticipated visit pattern and next visit date Friday 4 22 2w2 3w2 2w1 1w2  Significant clinical findings none at soc  Request for additional disciplines none  Request for medication clarification na  Request for other order clarification    AMD Packing ordered  fyi no amd pakcing at soc so iodoform packing used in its place  Needs follow up physician appointments scheduled has fu appts with dr Jazzmine Ellisor  Potential barriers to goal achievement delayed wound healing d/t diabetes  Other pertinent information  None at soc  Instructed pt to increase protein intake     Thank you for allowing us to participate in the care of your patient        Trenda Blizzard, RN   [phone]

## 2022-04-23 ENCOUNTER — DOCUMENTATION (OUTPATIENT)
Dept: PODIATRY | Facility: CLINIC | Age: 52
End: 2022-04-23

## 2022-04-23 DIAGNOSIS — L03.116 CELLULITIS OF FOOT, LEFT: Primary | ICD-10-CM

## 2022-04-23 LAB
BACTERIA WND AEROBE CULT: ABNORMAL
GRAM STN SPEC: ABNORMAL

## 2022-04-23 RX ORDER — DOXYCYCLINE 100 MG/1
100 TABLET ORAL 2 TIMES DAILY
Qty: 14 TABLET | Refills: 0 | Status: SHIPPED | OUTPATIENT
Start: 2022-04-23 | End: 2022-05-02 | Stop reason: HOSPADM

## 2022-04-26 ENCOUNTER — OFFICE VISIT (OUTPATIENT)
Dept: PODIATRY | Facility: CLINIC | Age: 52
End: 2022-04-26

## 2022-04-26 VITALS
DIASTOLIC BLOOD PRESSURE: 84 MMHG | SYSTOLIC BLOOD PRESSURE: 122 MMHG | HEIGHT: 72 IN | BODY MASS INDEX: 35.89 KG/M2 | WEIGHT: 265 LBS

## 2022-04-26 DIAGNOSIS — M96.840 POSTOPERATIVE HEMATOMA OF MUSCULOSKELETAL STRUCTURE FOLLOWING MUSCULOSKELETAL PROCEDURE: Primary | ICD-10-CM

## 2022-04-26 PROCEDURE — 99024 POSTOP FOLLOW-UP VISIT: CPT | Performed by: PODIATRIST

## 2022-04-26 RX ORDER — FLASH GLUCOSE SENSOR
KIT MISCELLANEOUS
COMMUNITY
Start: 2022-04-25

## 2022-04-26 NOTE — PROGRESS NOTES
PATIENT:  Shane Jon      1970      ASSESSMENT     1  Postoperative hematoma of musculoskeletal structure following musculoskeletal procedure           PLAN  Reviewed / discussed wound culture result  Most likely he had infected hematoma  Wound looks clean at this time, but still concern for wound healing and infection  Discussed options including local care vs surgical wash out / revision of the wound  Surgical wash out in OR would be optimal because he has high risk for infection and non-healing  He wishes to proceed with surgical wash-out  Admit him to [facility] tomorrow for IV antibiotics and surgery  HISTORY OF PRESENT ILLNESS  Patient presents for wound check  Pain is minimal   Tolerates doxycycline well  His BS is under control  REVIEW OF SYSTEMS  GENERAL: No fever or chills  HEART: No chest pain, or palpitation  RESPIRATORY:  No SOB or cough  GI: No Nausea, vomit or diarrhea  NEUROLOGIC: No syncope or acute weakness  MUSCULOSKELETAL: No calf pain or edema  PHYSICAL EXAMINATION  GENERAL  The patient appears in NAD / non-toxic  Afebrile  VSS    VASCULAR EXAM  Pedal pulses and vascular status are intact  No calf pain or edema bilaterally  No cyanosis  DERMATOLOGIC EXAM  Wound looks clean and granular  No purulence  Minimal redness / edema around the wound  No clinical signs of abscess  NEUROLOGIC EXAM  AAO X 3  No focal neurologic deficit  Neurologic status is intact BLE  MUSCULOSKELETAL EXAM  ROM intact  MMT intact

## 2022-04-27 ENCOUNTER — HOSPITAL ENCOUNTER (INPATIENT)
Facility: HOSPITAL | Age: 52
LOS: 5 days | Discharge: HOME HEALTH | DRG: 857 | End: 2022-05-02
Attending: PODIATRIST | Admitting: PODIATRIST
Payer: COMMERCIAL

## 2022-04-27 ENCOUNTER — APPOINTMENT (INPATIENT)
Dept: RADIOLOGY | Facility: HOSPITAL | Age: 52
DRG: 857 | End: 2022-04-27
Payer: COMMERCIAL

## 2022-04-27 DIAGNOSIS — I48.91 ATRIAL FIBRILLATION WITH RVR (HCC): ICD-10-CM

## 2022-04-27 DIAGNOSIS — L03.116 CELLULITIS OF FOOT, LEFT: ICD-10-CM

## 2022-04-27 DIAGNOSIS — E11.9 TYPE 2 DIABETES MELLITUS WITHOUT COMPLICATION, WITH LONG-TERM CURRENT USE OF INSULIN (HCC): Primary | ICD-10-CM

## 2022-04-27 DIAGNOSIS — Z79.4 TYPE 2 DIABETES MELLITUS WITHOUT COMPLICATION, WITH LONG-TERM CURRENT USE OF INSULIN (HCC): Primary | ICD-10-CM

## 2022-04-27 DIAGNOSIS — I48.91 ATRIAL FIBRILLATION, UNSPECIFIED TYPE (HCC): ICD-10-CM

## 2022-04-27 DIAGNOSIS — L08.9 INFECTED HEMATOMA: Primary | ICD-10-CM

## 2022-04-27 DIAGNOSIS — T14.8XXA INFECTED HEMATOMA: Primary | ICD-10-CM

## 2022-04-27 DIAGNOSIS — I10 HYPERTENSION, UNSPECIFIED TYPE: ICD-10-CM

## 2022-04-27 DIAGNOSIS — L08.9 INFECTED HEMATOMA: ICD-10-CM

## 2022-04-27 DIAGNOSIS — T14.8XXA INFECTED HEMATOMA: ICD-10-CM

## 2022-04-27 PROBLEM — Z01.818 PRE-OP EVALUATION: Status: ACTIVE | Noted: 2022-04-27

## 2022-04-27 LAB
ALBUMIN SERPL BCP-MCNC: 3.6 G/DL (ref 3.5–5)
ALP SERPL-CCNC: 108 U/L (ref 46–116)
ALT SERPL W P-5'-P-CCNC: 33 U/L (ref 12–78)
ANION GAP SERPL CALCULATED.3IONS-SCNC: 4 MMOL/L (ref 4–13)
AST SERPL W P-5'-P-CCNC: 26 U/L (ref 5–45)
BASOPHILS # BLD AUTO: 0.05 THOUSANDS/ΜL (ref 0–0.1)
BASOPHILS NFR BLD AUTO: 1 % (ref 0–1)
BILIRUB SERPL-MCNC: 0.64 MG/DL (ref 0.2–1)
BUN SERPL-MCNC: 50 MG/DL (ref 5–25)
CALCIUM SERPL-MCNC: 9.3 MG/DL (ref 8.3–10.1)
CHLORIDE SERPL-SCNC: 105 MMOL/L (ref 100–108)
CO2 SERPL-SCNC: 32 MMOL/L (ref 21–32)
CREAT SERPL-MCNC: 1.56 MG/DL (ref 0.6–1.3)
EOSINOPHIL # BLD AUTO: 0.27 THOUSAND/ΜL (ref 0–0.61)
EOSINOPHIL NFR BLD AUTO: 4 % (ref 0–6)
ERYTHROCYTE [DISTWIDTH] IN BLOOD BY AUTOMATED COUNT: 13.7 % (ref 11.6–15.1)
GFR SERPL CREATININE-BSD FRML MDRD: 49 ML/MIN/1.73SQ M
GLUCOSE SERPL-MCNC: 109 MG/DL (ref 65–140)
HCT VFR BLD AUTO: 37.6 % (ref 36.5–49.3)
HGB BLD-MCNC: 12.5 G/DL (ref 12–17)
IMM GRANULOCYTES # BLD AUTO: 0.02 THOUSAND/UL (ref 0–0.2)
IMM GRANULOCYTES NFR BLD AUTO: 0 % (ref 0–2)
LYMPHOCYTES # BLD AUTO: 1.59 THOUSANDS/ΜL (ref 0.6–4.47)
LYMPHOCYTES NFR BLD AUTO: 22 % (ref 14–44)
MCH RBC QN AUTO: 28.1 PG (ref 26.8–34.3)
MCHC RBC AUTO-ENTMCNC: 33.2 G/DL (ref 31.4–37.4)
MCV RBC AUTO: 85 FL (ref 82–98)
MONOCYTES # BLD AUTO: 0.58 THOUSAND/ΜL (ref 0.17–1.22)
MONOCYTES NFR BLD AUTO: 8 % (ref 4–12)
NEUTROPHILS # BLD AUTO: 4.68 THOUSANDS/ΜL (ref 1.85–7.62)
NEUTS SEG NFR BLD AUTO: 65 % (ref 43–75)
NRBC BLD AUTO-RTO: 0 /100 WBCS
PLATELET # BLD AUTO: 201 THOUSANDS/UL (ref 149–390)
PMV BLD AUTO: 11.2 FL (ref 8.9–12.7)
POTASSIUM SERPL-SCNC: 3.6 MMOL/L (ref 3.5–5.3)
PROT SERPL-MCNC: 7.3 G/DL (ref 6.4–8.2)
RBC # BLD AUTO: 4.45 MILLION/UL (ref 3.88–5.62)
SODIUM SERPL-SCNC: 141 MMOL/L (ref 136–145)
WBC # BLD AUTO: 7.19 THOUSAND/UL (ref 4.31–10.16)

## 2022-04-27 PROCEDURE — 73630 X-RAY EXAM OF FOOT: CPT

## 2022-04-27 PROCEDURE — 85025 COMPLETE CBC W/AUTO DIFF WBC: CPT

## 2022-04-27 PROCEDURE — 93005 ELECTROCARDIOGRAM TRACING: CPT

## 2022-04-27 PROCEDURE — 99222 1ST HOSP IP/OBS MODERATE 55: CPT | Performed by: INTERNAL MEDICINE

## 2022-04-27 PROCEDURE — NC001 PR NO CHARGE: Performed by: PODIATRIST

## 2022-04-27 PROCEDURE — 80053 COMPREHEN METABOLIC PANEL: CPT

## 2022-04-27 RX ORDER — AMLODIPINE BESYLATE 5 MG/1
5 TABLET ORAL 2 TIMES DAILY
Status: DISCONTINUED | OUTPATIENT
Start: 2022-04-27 | End: 2022-05-01

## 2022-04-27 RX ORDER — CHLORTHALIDONE 25 MG/1
50 TABLET ORAL DAILY
Status: DISCONTINUED | OUTPATIENT
Start: 2022-04-28 | End: 2022-05-02 | Stop reason: HOSPADM

## 2022-04-27 RX ORDER — TERAZOSIN 1 MG/1
4 CAPSULE ORAL 2 TIMES DAILY
Status: DISCONTINUED | OUTPATIENT
Start: 2022-04-27 | End: 2022-05-02 | Stop reason: HOSPADM

## 2022-04-27 RX ORDER — ONDANSETRON 2 MG/ML
4 INJECTION INTRAMUSCULAR; INTRAVENOUS EVERY 6 HOURS PRN
Status: DISCONTINUED | OUTPATIENT
Start: 2022-04-27 | End: 2022-05-02 | Stop reason: HOSPADM

## 2022-04-27 RX ORDER — ATORVASTATIN CALCIUM 40 MG/1
40 TABLET, FILM COATED ORAL
Status: DISCONTINUED | OUTPATIENT
Start: 2022-04-28 | End: 2022-05-02 | Stop reason: HOSPADM

## 2022-04-27 RX ORDER — ACETAMINOPHEN 325 MG/1
650 TABLET ORAL EVERY 6 HOURS PRN
Status: DISCONTINUED | OUTPATIENT
Start: 2022-04-27 | End: 2022-04-28

## 2022-04-27 RX ORDER — INSULIN GLARGINE 100 [IU]/ML
70 INJECTION, SOLUTION SUBCUTANEOUS
Status: DISCONTINUED | OUTPATIENT
Start: 2022-04-27 | End: 2022-04-29

## 2022-04-27 RX ORDER — CARVEDILOL 25 MG/1
50 TABLET ORAL 2 TIMES DAILY WITH MEALS
Status: DISCONTINUED | OUTPATIENT
Start: 2022-04-28 | End: 2022-05-02 | Stop reason: HOSPADM

## 2022-04-27 RX ORDER — LISINOPRIL 10 MG/1
10 TABLET ORAL 2 TIMES DAILY
Status: DISCONTINUED | OUTPATIENT
Start: 2022-04-27 | End: 2022-04-29

## 2022-04-27 RX ORDER — POLYETHYLENE GLYCOL 3350 17 G/17G
17 POWDER, FOR SOLUTION ORAL DAILY PRN
Status: DISCONTINUED | OUTPATIENT
Start: 2022-04-27 | End: 2022-05-02 | Stop reason: HOSPADM

## 2022-04-27 RX ORDER — INSULIN GLARGINE 100 [IU]/ML
60 INJECTION, SOLUTION SUBCUTANEOUS
Status: DISCONTINUED | OUTPATIENT
Start: 2022-04-28 | End: 2022-05-02 | Stop reason: HOSPADM

## 2022-04-27 RX ORDER — FLUTICASONE PROPIONATE 50 MCG
2 SPRAY, SUSPENSION (ML) NASAL AS NEEDED
Status: DISCONTINUED | OUTPATIENT
Start: 2022-04-27 | End: 2022-05-02 | Stop reason: HOSPADM

## 2022-04-27 RX ADMIN — VANCOMYCIN HYDROCHLORIDE 2000 MG: 10 INJECTION, POWDER, LYOPHILIZED, FOR SOLUTION INTRAVENOUS at 23:14

## 2022-04-28 ENCOUNTER — ANESTHESIA EVENT (INPATIENT)
Dept: PERIOP | Facility: HOSPITAL | Age: 52
DRG: 857 | End: 2022-04-28
Payer: COMMERCIAL

## 2022-04-28 ENCOUNTER — APPOINTMENT (INPATIENT)
Dept: RADIOLOGY | Facility: HOSPITAL | Age: 52
DRG: 857 | End: 2022-04-28
Payer: COMMERCIAL

## 2022-04-28 ENCOUNTER — ANESTHESIA (INPATIENT)
Dept: PERIOP | Facility: HOSPITAL | Age: 52
DRG: 857 | End: 2022-04-28
Payer: COMMERCIAL

## 2022-04-28 LAB
ANION GAP SERPL CALCULATED.3IONS-SCNC: 4 MMOL/L (ref 4–13)
ATRIAL RATE: 66 BPM
BUN SERPL-MCNC: 38 MG/DL (ref 5–25)
CALCIUM SERPL-MCNC: 9.2 MG/DL (ref 8.3–10.1)
CHLORIDE SERPL-SCNC: 105 MMOL/L (ref 100–108)
CO2 SERPL-SCNC: 31 MMOL/L (ref 21–32)
CREAT SERPL-MCNC: 1.28 MG/DL (ref 0.6–1.3)
GFR SERPL CREATININE-BSD FRML MDRD: 63 ML/MIN/1.73SQ M
GLUCOSE SERPL-MCNC: 136 MG/DL (ref 65–140)
GLUCOSE SERPL-MCNC: 206 MG/DL (ref 65–140)
GLUCOSE SERPL-MCNC: 66 MG/DL (ref 65–140)
GLUCOSE SERPL-MCNC: 75 MG/DL (ref 65–140)
GLUCOSE SERPL-MCNC: 77 MG/DL (ref 65–140)
GLUCOSE SERPL-MCNC: 98 MG/DL (ref 65–140)
P AXIS: 72 DEGREES
POTASSIUM SERPL-SCNC: 3.6 MMOL/L (ref 3.5–5.3)
PR INTERVAL: 200 MS
QRS AXIS: 0 DEGREES
QRSD INTERVAL: 88 MS
QT INTERVAL: 408 MS
QTC INTERVAL: 427 MS
SODIUM SERPL-SCNC: 140 MMOL/L (ref 136–145)
T WAVE AXIS: -2 DEGREES
VENTRICULAR RATE: 66 BPM

## 2022-04-28 PROCEDURE — 82948 REAGENT STRIP/BLOOD GLUCOSE: CPT

## 2022-04-28 PROCEDURE — 73630 X-RAY EXAM OF FOOT: CPT

## 2022-04-28 PROCEDURE — 0JBR0ZZ EXCISION OF LEFT FOOT SUBCUTANEOUS TISSUE AND FASCIA, OPEN APPROACH: ICD-10-PCS | Performed by: PODIATRIST

## 2022-04-28 PROCEDURE — 88311 DECALCIFY TISSUE: CPT | Performed by: PATHOLOGY

## 2022-04-28 PROCEDURE — 28122 PARTIAL REMOVAL OF FOOT BONE: CPT | Performed by: PODIATRIST

## 2022-04-28 PROCEDURE — 0QBP0ZZ EXCISION OF LEFT METATARSAL, OPEN APPROACH: ICD-10-PCS | Performed by: PODIATRIST

## 2022-04-28 PROCEDURE — 80048 BASIC METABOLIC PNL TOTAL CA: CPT

## 2022-04-28 PROCEDURE — 97605 NEG PRS WND THER DME<=50SQCM: CPT | Performed by: PODIATRIST

## 2022-04-28 PROCEDURE — 88304 TISSUE EXAM BY PATHOLOGIST: CPT | Performed by: PATHOLOGY

## 2022-04-28 PROCEDURE — NC001 PR NO CHARGE: Performed by: PODIATRIST

## 2022-04-28 PROCEDURE — 99232 SBSQ HOSP IP/OBS MODERATE 35: CPT | Performed by: PHYSICIAN ASSISTANT

## 2022-04-28 PROCEDURE — 93010 ELECTROCARDIOGRAM REPORT: CPT | Performed by: INTERNAL MEDICINE

## 2022-04-28 RX ORDER — DABIGATRAN ETEXILATE 150 MG/1
150 CAPSULE, COATED PELLETS ORAL 2 TIMES DAILY
Status: DISCONTINUED | OUTPATIENT
Start: 2022-04-28 | End: 2022-04-29

## 2022-04-28 RX ORDER — MAGNESIUM HYDROXIDE 1200 MG/15ML
LIQUID ORAL AS NEEDED
Status: DISCONTINUED | OUTPATIENT
Start: 2022-04-28 | End: 2022-04-28 | Stop reason: HOSPADM

## 2022-04-28 RX ORDER — LIDOCAINE HYDROCHLORIDE 10 MG/ML
INJECTION, SOLUTION EPIDURAL; INFILTRATION; INTRACAUDAL; PERINEURAL AS NEEDED
Status: DISCONTINUED | OUTPATIENT
Start: 2022-04-28 | End: 2022-04-28

## 2022-04-28 RX ORDER — OXYCODONE HYDROCHLORIDE 5 MG/1
5 TABLET ORAL EVERY 4 HOURS PRN
Status: DISCONTINUED | OUTPATIENT
Start: 2022-04-28 | End: 2022-05-02 | Stop reason: HOSPADM

## 2022-04-28 RX ORDER — ALBUTEROL SULFATE 2.5 MG/3ML
2.5 SOLUTION RESPIRATORY (INHALATION) ONCE AS NEEDED
Status: DISCONTINUED | OUTPATIENT
Start: 2022-04-28 | End: 2022-04-29

## 2022-04-28 RX ORDER — PROPOFOL 10 MG/ML
INJECTION, EMULSION INTRAVENOUS AS NEEDED
Status: DISCONTINUED | OUTPATIENT
Start: 2022-04-28 | End: 2022-04-28

## 2022-04-28 RX ORDER — ONDANSETRON 2 MG/ML
4 INJECTION INTRAMUSCULAR; INTRAVENOUS ONCE AS NEEDED
Status: DISCONTINUED | OUTPATIENT
Start: 2022-04-28 | End: 2022-04-29 | Stop reason: HOSPADM

## 2022-04-28 RX ORDER — ONDANSETRON 2 MG/ML
INJECTION INTRAMUSCULAR; INTRAVENOUS AS NEEDED
Status: DISCONTINUED | OUTPATIENT
Start: 2022-04-28 | End: 2022-04-28

## 2022-04-28 RX ORDER — ACETAMINOPHEN 325 MG/1
650 TABLET ORAL EVERY 4 HOURS PRN
Status: DISCONTINUED | OUTPATIENT
Start: 2022-04-28 | End: 2022-05-02 | Stop reason: HOSPADM

## 2022-04-28 RX ORDER — OXYCODONE HYDROCHLORIDE 10 MG/1
10 TABLET ORAL EVERY 4 HOURS PRN
Status: DISCONTINUED | OUTPATIENT
Start: 2022-04-28 | End: 2022-05-02 | Stop reason: HOSPADM

## 2022-04-28 RX ORDER — DEXTROSE MONOHYDRATE 25 G/50ML
INJECTION, SOLUTION INTRAVENOUS
Status: DISPENSED
Start: 2022-04-28 | End: 2022-04-28

## 2022-04-28 RX ORDER — INSULIN GLARGINE 100 [IU]/ML
70 INJECTION, SOLUTION SUBCUTANEOUS
Status: DISCONTINUED | OUTPATIENT
Start: 2022-04-28 | End: 2022-04-28

## 2022-04-28 RX ORDER — SODIUM CHLORIDE, SODIUM LACTATE, POTASSIUM CHLORIDE, CALCIUM CHLORIDE 600; 310; 30; 20 MG/100ML; MG/100ML; MG/100ML; MG/100ML
INJECTION, SOLUTION INTRAVENOUS CONTINUOUS PRN
Status: DISCONTINUED | OUTPATIENT
Start: 2022-04-28 | End: 2022-04-28

## 2022-04-28 RX ORDER — HYDROMORPHONE HCL/PF 1 MG/ML
0.5 SYRINGE (ML) INJECTION
Status: DISCONTINUED | OUTPATIENT
Start: 2022-04-28 | End: 2022-05-02 | Stop reason: HOSPADM

## 2022-04-28 RX ORDER — INSULIN GLARGINE 100 [IU]/ML
60 INJECTION, SOLUTION SUBCUTANEOUS
Status: DISCONTINUED | OUTPATIENT
Start: 2022-04-29 | End: 2022-04-28

## 2022-04-28 RX ORDER — FENTANYL CITRATE 50 UG/ML
INJECTION, SOLUTION INTRAMUSCULAR; INTRAVENOUS AS NEEDED
Status: DISCONTINUED | OUTPATIENT
Start: 2022-04-28 | End: 2022-04-28

## 2022-04-28 RX ORDER — INSULIN GLARGINE 100 [IU]/ML
45 INJECTION, SOLUTION SUBCUTANEOUS
Status: DISCONTINUED | OUTPATIENT
Start: 2022-04-29 | End: 2022-04-28

## 2022-04-28 RX ORDER — FENTANYL CITRATE/PF 50 MCG/ML
50 SYRINGE (ML) INJECTION
Status: DISCONTINUED | OUTPATIENT
Start: 2022-04-28 | End: 2022-04-29 | Stop reason: HOSPADM

## 2022-04-28 RX ADMIN — PROPOFOL 200 MG: 10 INJECTION, EMULSION INTRAVENOUS at 11:32

## 2022-04-28 RX ADMIN — ONDANSETRON 4 MG: 2 INJECTION INTRAMUSCULAR; INTRAVENOUS at 11:32

## 2022-04-28 RX ADMIN — AMLODIPINE BESYLATE 5 MG: 5 TABLET ORAL at 17:20

## 2022-04-28 RX ADMIN — INSULIN LISPRO 4 UNITS: 100 INJECTION, SOLUTION INTRAVENOUS; SUBCUTANEOUS at 17:18

## 2022-04-28 RX ADMIN — CARVEDILOL 50 MG: 25 TABLET, FILM COATED ORAL at 07:41

## 2022-04-28 RX ADMIN — LISINOPRIL 10 MG: 10 TABLET ORAL at 22:20

## 2022-04-28 RX ADMIN — CHLORTHALIDONE 50 MG: 25 TABLET ORAL at 08:50

## 2022-04-28 RX ADMIN — SODIUM CHLORIDE, SODIUM LACTATE, POTASSIUM CHLORIDE, AND CALCIUM CHLORIDE: .6; .31; .03; .02 INJECTION, SOLUTION INTRAVENOUS at 11:32

## 2022-04-28 RX ADMIN — PROPOFOL 100 MG: 10 INJECTION, EMULSION INTRAVENOUS at 11:37

## 2022-04-28 RX ADMIN — TERAZOSIN HYDROCHLORIDE 4 MG: 1 CAPSULE ORAL at 22:21

## 2022-04-28 RX ADMIN — AMLODIPINE BESYLATE 5 MG: 5 TABLET ORAL at 08:52

## 2022-04-28 RX ADMIN — CARVEDILOL 50 MG: 25 TABLET, FILM COATED ORAL at 17:20

## 2022-04-28 RX ADMIN — LISINOPRIL 10 MG: 10 TABLET ORAL at 08:53

## 2022-04-28 RX ADMIN — PHENYLEPHRINE HYDROCHLORIDE 20 MCG/MIN: 10 INJECTION INTRAVENOUS at 11:32

## 2022-04-28 RX ADMIN — VANCOMYCIN HYDROCHLORIDE 1250 MG: 10 INJECTION, POWDER, LYOPHILIZED, FOR SOLUTION INTRAVENOUS at 12:00

## 2022-04-28 RX ADMIN — FENTANYL CITRATE 50 MCG: 50 INJECTION INTRAMUSCULAR; INTRAVENOUS at 11:38

## 2022-04-28 RX ADMIN — ATORVASTATIN CALCIUM 40 MG: 40 TABLET, FILM COATED ORAL at 17:20

## 2022-04-28 RX ADMIN — INSULIN LISPRO 2 UNITS: 100 INJECTION, SOLUTION INTRAVENOUS; SUBCUTANEOUS at 17:19

## 2022-04-28 RX ADMIN — OXYCODONE HYDROCHLORIDE 5 MG: 5 TABLET ORAL at 15:10

## 2022-04-28 RX ADMIN — PROPOFOL 40 MG: 10 INJECTION, EMULSION INTRAVENOUS at 11:40

## 2022-04-28 RX ADMIN — FENTANYL CITRATE 50 MCG: 50 INJECTION INTRAMUSCULAR; INTRAVENOUS at 11:42

## 2022-04-28 RX ADMIN — VANCOMYCIN HYDROCHLORIDE 1250 MG: 10 INJECTION, POWDER, LYOPHILIZED, FOR SOLUTION INTRAVENOUS at 15:38

## 2022-04-28 RX ADMIN — TERAZOSIN HYDROCHLORIDE 4 MG: 1 CAPSULE ORAL at 08:52

## 2022-04-28 RX ADMIN — DABIGATRAN ETEXILATE MESYLATE 150 MG: 150 CAPSULE ORAL at 17:20

## 2022-04-28 RX ADMIN — INSULIN GLARGINE 70 UNITS: 100 INJECTION, SOLUTION SUBCUTANEOUS at 22:20

## 2022-04-28 RX ADMIN — VANCOMYCIN HYDROCHLORIDE 1250 MG: 10 INJECTION, POWDER, LYOPHILIZED, FOR SOLUTION INTRAVENOUS at 22:21

## 2022-04-28 NOTE — PROGRESS NOTES
[facility]  Progress Note - Kemal Doss 1970, 46 y o  male MRN: [id number]  Unit/Bed#: MS [facility]-01 Encounter: [id number]  Primary Care Provider: Harvinder Alejandre MD   Date and time admitted to hospital: 4/27/2022  7:14 PM    * Pre-op evaluation  Assessment & Plan  · Pre op eval patient appropriate for surgery per chart review   · Plan for OR today 4/28            Infected hematoma  Assessment & Plan  · Left foot infected post op hematoma  · S/p left 5th metatarsal resection (DOS: 4/8/22)  · Per Podiatry notes: Plan to go to OR for left foot infected surgical site hematoma washout tomorrow pending medical clearance and OR schedule availability  · On IV Vancomycin given operative cultures positive for MRSA   · Plan for po bactrim on discharge     Atrial fibrillation ([address] )  Assessment & Plan  · Noted histroy s/p ablation in 2017 with return of a fib   · On pradaxa  · Recommend restarting when able   · Coreg for rate control   · Monitor hear rates     Diabetes mellitus, type 2 [facility])  Assessment & Plan  Lab Results   Component Value Date    HGBA1C 13 3 (H) 04/06/2021       Recent Labs     04/28/22  0616 04/28/22  0740   POCGLU 66 75       Blood Sugar Average: Last 72 hrs:  (P) 70 5   · Home insulin 65 AM 90 PM lantus with sliding scale   · Currently on 60 units AM and 70 units HS of lantus with sliding scale   · Decreased given he is on carb controlled diet  · Start lantus tonight   · Continue SSI   · Adjust as needed     HTN (hypertension)  Assessment & Plan  · Reviewed and stable   · Continue home bp meds       VTE Pharmacologic Prophylaxis: VTE Score: 2 Moderate Risk (Score 3-4) - Pharmacological DVT Prophylaxis Ordered: dabigatran (Pradaxa)  Patient Centered Rounds: I performed bedside rounds with nursing staff today    Discussions with Specialists or Other Care Team Provider: podiatry     Education and Discussions with Family / Patient: Updated  (wife) at bedside  Time Spent for Care: 45 minutes  More than 50% of total time spent on counseling and coordination of care as described above  Current Length of Stay: 1 day(s)  Current Patient Status: Inpatient   Certification Statement: The patient will continue to require additional inpatient hospital stay due to pending podiatry   Discharge Plan: [facility] is following this patient on consult  They are medically stable for discharge when deemed appropriate by primary service  Code Status: Level 1 - Full Code    Subjective:   Patient is post washout  Feeling well  Restarting insulin  No other concerns at this time  Objective:     Vitals:   Temp (24hrs), Av 2 °F (36 8 °C), Min:98 1 °F (36 7 °C), Max:98 4 °F (36 9 °C)    Temp:  [98 1 °F (36 7 °C)-98 4 °F (36 9 °C)] 98 3 °F (36 8 °C)  HR:  [47-67] 47  Resp:  [10-18] 10  BP: (107-151)/(73-90) 135/82  SpO2:  [92 %-99 %] 97 %  Body mass index is 35 88 kg/m²  Input and Output Summary (last 24 hours): Intake/Output Summary (Last 24 hours) at 2022 1342  Last data filed at 2022 1212  Gross per 24 hour   Intake 1000 ml   Output --   Net 1000 ml       Physical Exam:   Physical Exam  Vitals and nursing note reviewed  Constitutional:       General: He is not in acute distress  Appearance: He is obese  HENT:      Head: Normocephalic and atraumatic  Mouth/Throat:      Mouth: Mucous membranes are moist       Pharynx: Oropharynx is clear  Eyes:      Conjunctiva/sclera: Conjunctivae normal       Pupils: Pupils are equal, round, and reactive to light  Cardiovascular:      Rate and Rhythm: Normal rate and regular rhythm  Pulses: Normal pulses  Heart sounds: Normal heart sounds  No murmur heard  Pulmonary:      Effort: Pulmonary effort is normal  No respiratory distress  Breath sounds: Normal breath sounds  No wheezing or rales  Abdominal:      General: Abdomen is flat  There is no distension        Palpations: Abdomen is soft       Tenderness: There is no abdominal tenderness  Musculoskeletal:      Cervical back: No muscular tenderness  Right lower leg: No edema  Left lower leg: No edema  Feet:      Comments: Tender to palpation of lateral aspect of left foot  In dressing  Skin:     General: Skin is warm and dry  Capillary Refill: Capillary refill takes less than 2 seconds  Coloration: Skin is not jaundiced  Neurological:      General: No focal deficit present  Mental Status: He is alert and oriented to person, place, and time  Cranial Nerves: No cranial nerve deficit  Psychiatric:         Mood and Affect: Mood normal           Additional Data:     Labs:  Results from last 7 days   Lab Units 04/27/22  2232   WBC Thousand/uL 7 19   HEMOGLOBIN g/dL 12 5   HEMATOCRIT % 37 6   PLATELETS Thousands/uL 201   NEUTROS PCT % 65   LYMPHS PCT % 22   MONOS PCT % 8   EOS PCT % 4     Results from last 7 days   Lab Units 04/28/22  0734 04/27/22  2232 04/27/22  2232   SODIUM mmol/L 140   < > 141   POTASSIUM mmol/L 3 6   < > 3 6   CHLORIDE mmol/L 105   < > 105   CO2 mmol/L 31   < > 32   BUN mg/dL 38*   < > 50*   CREATININE mg/dL 1 28   < > 1 56*   ANION GAP mmol/L 4   < > 4   CALCIUM mg/dL 9 2   < > 9 3   ALBUMIN g/dL  --   --  3 6   TOTAL BILIRUBIN mg/dL  --   --  0 64   ALK PHOS U/L  --   --  108   ALT U/L  --   --  33   AST U/L  --   --  26   GLUCOSE RANDOM mg/dL 77   < > 109    < > = values in this interval not displayed  Results from last 7 days   Lab Units 04/28/22  0740 04/28/22  0616   POC GLUCOSE mg/dl 75 66               Lines/Drains:  Invasive Devices  Report    Peripheral Intravenous Line            Peripheral IV 04/27/22 Dorsal (posterior); Left Forearm <1 day                      Imaging: No pertinent imaging reviewed      Recent Cultures (last 7 days):         Last 24 Hours Medication List:   Current Facility-Administered Medications   Medication Dose Route Frequency Provider Last Rate    acetaminophen  650 mg Oral Q6H PRN Rolo Fierro, DPM      albuterol  2 5 mg Nebulization Once PRN Joshua Quinteros CRNA      amLODIPine  5 mg Oral BID Rolo Fierro, DPM      atorvastatin  40 mg Oral Daily With [address] [place], [place]      carvedilol  50 mg Oral BID With Meals Rolo Fierro, DPM      chlorthalidone  50 mg Oral Daily Rolo Fierro, DPM      dextrose           enoxaparin  30 mg Subcutaneous Q12H [facility] Roloobey Fierro [place]      fentaNYL  50 mcg Intravenous Q10 Min PRN Joshua Quinteros CRNA      fluticasone  2 spray Each Nare PRN Rolo Fierro, DPM      insulin glargine  60 Units Subcutaneous Daily With Breakfast Rolo Fierro, DPM      insulin glargine  70 Units Subcutaneous HS Rolo Fierro, DPM      insulin lispro  1-6 Units Subcutaneous Q6H [facility] Vadim [place]      insulin lispro  2-12 Units Subcutaneous TID AC Ramiro Saba PA-C      lisinopril  10 mg Oral BID Rolo Fierro, DPM      ondansetron  4 mg Intravenous Q6H PRN Rolo Fierro, DPM      ondansetron  4 mg Intravenous Once PRN Joshua Quinteros CRNA      polyethylene glycol  17 g Oral Daily PRN Rolo Fierro, DPM      terazosin  4 mg Oral BID Rolo Fierro, DPM      vancomycin  12 5 mg/kg (Adjusted) Intravenous Q12H Rolo Fierro, LIGIAM          Today, Patient Was Seen By: Amina Azevedo PA-C    **Please Note: This note may have been constructed using a voice recognition system  **

## 2022-04-28 NOTE — ASSESSMENT & PLAN NOTE
Left foot infected post op hematoma  S/p left 5th metatarsal resection (DOS: 4/8/22)  Per Podiatry notes: Plan to go to OR for left foot infected surgical site hematoma washout tomorrow pending medical clearance and OR schedule availability  On IV Vancomycin

## 2022-04-28 NOTE — PROGRESS NOTES
Podiatry - Progress Note  Patient: Pako Jaramillo 46 y o  male   MRN: [id number]  PCP: Anabel Dennison MD  Unit/Bed#: -01 Encounter: [id number]  Date Of Visit: 22    ASSESSMENT:    Pako Jaramillo is a 46 y o  male with:    1  Left foot infected post op hematoma  2  S/p left 5th metatarsal resection (DOS: 22)  3  T2DM  4  A-fib  5  HTN      PLAN:    · Patient to go to OR today,22, for left foot washout with Dr Ros Bhatt  · Consent to be signed with surgeon prior to procedure  · Confirmed NPO status  · H&P, vitals, and current labs reviewed  No acute changes noted  · Alternatives, risks, and complications discussed with patient  · All questions answered  No guarantees given of outcome  · Rest of medical care per primary team      Antibiotics started: Vancomycin  Pharmacologic VTE Prophylaxis: Enoxaparin (Lovenox)   Weight Bearing Status: WBAT, surgical shoe left foot        Disposition:  Patient requires >2 midnight stay for surgical intervention to left foot and IV antibiotics  Code Status: Level 1 - Full Code    SUBJECTIVE:     The patient was seen, evaluated, and assessed at bedside today  The patient was awake, alert, and in no acute distress  Patient confirmed NPO status  All questions and concerns regarding the surgical procedure addressed  Patient understands risks vs benefits of procedure and remains amenable with plan for surgery today  Patient denies N/V/F/chills/SOB/CP  OBJECTIVE:     Vitals:   /90   Pulse 56   Temp 98 1 °F (36 7 °C) (Oral)   Resp 18   Ht 6' (1 829 m)   Wt 120 kg (264 lb 8 8 oz)   SpO2 99%   BMI 35 88 kg/m²     Temp (24hrs), Av 1 °F (36 7 °C), Min:98 1 °F (36 7 °C), Max:98 1 °F (36 7 °C)      Physical Exam:     General:  Alert, cooperative, and in no distress  Lower extremity exam:  Cardiovascular status at baseline  Neurological status at baseline  Musculoskeletal status at baseline  No calf tenderness noted bilaterally   Dressing left intact to the Operating Room  Additional Data:     Labs:    Results from last 7 days   Lab Units 04/27/22  2232   WBC Thousand/uL 7 19   HEMOGLOBIN g/dL 12 5   HEMATOCRIT % 37 6   PLATELETS Thousands/uL 201   NEUTROS PCT % 65   LYMPHS PCT % 22   MONOS PCT % 8   EOS PCT % 4     Results from last 7 days   Lab Units 04/28/22  0734 04/27/22  2232 04/27/22  2232   POTASSIUM mmol/L 3 6   < > 3 6   CHLORIDE mmol/L 105   < > 105   CO2 mmol/L 31   < > 32   BUN mg/dL 38*   < > 50*   CREATININE mg/dL 1 28   < > 1 56*   CALCIUM mg/dL 9 2   < > 9 3   ALK PHOS U/L  --   --  108   ALT U/L  --   --  33   AST U/L  --   --  26    < > = values in this interval not displayed  * I Have Reviewed All Lab Data Listed Above  Imaging: I have personally reviewed pertinent films in PACS  EKG, Pathology, and Other Studies: I have personally reviewed pertinent reports  ** Please Note: Portions of the record may have been created with voice recognition software  Occasional wrong word or "sound a like" substitutions may have occurred due to the inherent limitations of voice recognition software  Read the chart carefully and recognize, using context, where substitutions have occurred   **

## 2022-04-28 NOTE — ASSESSMENT & PLAN NOTE
Medicine team asked Podiatry to perform preop medical risk stratification for patient  Planned surgery is left foot infected hematoma washout with general anesthesia planned for tomorrow  Likely an intermediate risk surgery  Non emergent surgery but urgent  Patient with no symptoms of chest pain at rest or with exertion, no recent or past MI, no symptoms of decompensated heart failure, is not currently having a significant arrhythmia and no history of severe vascular disease  Patient does report a history of AFib for which he underwent an ablation in 2017 and then underwent a cardioversion for AFib in January  Denies any episodes of AFib since then  Patient is currently in a sinus rhythm  RCRI score of 2: Positive for history of DM with insulin, Heart Failure; Negative for high risk surgery, h/o MI, h/o CVA, Pre-Op Cr >2 0  RCRI score of 2 correlates with a 10 1% rate of 30 day cardiac death, nonfatal MI and nonfatal cardiac arrest following surgery  -Do note that patient has an elevated Cr of 1 4 (was 1 16 in 2017) and suspect this is likely CKD  Although patient's creatinine is less than 2, do note increased risk with patient's baseline CKD  -patient reports that he is able to go up several flights of stairs without chest pain or shortness of breath and without needing to stop  Patient also reports that he is able to use his scooter in which he pushes off with his right foot while going through the mall without any symptoms of chest pain or shortness of breath  -Mets of at least > or = 4 and thus patient has at least moderate functional status  -Given patient's ability to perform at least 4 Mets, no further cardiac testing (class 2b) indicated  With patient understanding the risk (10 1% rate of 30 day cardiac death, nonfatal MI and nonfatal cardiac arrest following surgery), it would be acceptable for patient to undergo the surgery

## 2022-04-28 NOTE — PROGRESS NOTES
Vancomycin Assessment    Samson Barber is a 46 y o  male who is currently receiving vancomycin 1250mg every 12 hours for other cellulitis   Relevant clinical data and objective history reviewed:  Creatinine   Date Value Ref Range Status   04/27/2022 1 56 (H) 0 60 - 1 30 mg/dL Final     Comment:     Standardized to IDMS reference method   03/25/2022 1 47 (H) 0 60 - 1 30 mg/dL Final     Comment:     Standardized to IDMS reference method   12/23/2014 1 16 0 60 - 1 30 mg/dL Final     Comment:     Standardized to IDMS reference method     /90   Pulse 63   Temp 98 1 °F (36 7 °C)   Resp 18   SpO2 98%   No intake/output data recorded  Lab Results   Component Value Date/Time    BUN 50 (H) 04/27/2022 10:32 PM    BUN 21 12/23/2014 08:34 AM    WBC 7 19 04/27/2022 10:32 PM    WBC 8 60 12/23/2014 08:34 AM    HGB 12 5 04/27/2022 10:32 PM    HGB 15 8 12/23/2014 08:34 AM    HCT 37 6 04/27/2022 10:32 PM    HCT 45 0 12/23/2014 08:34 AM    MCV 85 04/27/2022 10:32 PM    MCV 83 12/23/2014 08:34 AM     04/27/2022 10:32 PM     12/23/2014 08:34 AM     Temp Readings from Last 3 Encounters:   04/27/22 98 1 °F (36 7 °C)   04/08/22 97 5 °F (36 4 °C)   12/30/21 (!) 96 8 °F (36 °C) (Temporal)     Vancomycin Days of Therapy: 2    Assessment/Plan  The patient is currently on vancomycin utilizing scheduled dosing based on adjusted body weight (due to obesity)  Baseline risks associated with therapy include: pre-existing renal impairment  The patient is currently receiving 1250mg every 12 hours and is clinically appropriate and dose will be continued  Pharmacy will also follow closely for s/sx of nephrotoxicity, infusion reactions, and appropriateness of therapy  BMP and CBC will be ordered per protocol  Plan for trough as patient approaches steady state, prior to the 4th  dose at approximately on 04/29/22 at 10 30  Due to infection severity, will target a trough of 10-15 (mild infection/cellulitis)     Pharmacy will continue to follow the patients culture results and clinical progress daily      Prakash Espitia, Pharmacist

## 2022-04-28 NOTE — ANESTHESIA POSTPROCEDURE EVALUATION
Post-Op Assessment Note    CV Status:  Stable  Pain Score: 0    Pain management: adequate     Mental Status:  Alert and awake   Hydration Status:  Euvolemic   PONV Controlled:  Controlled   Airway Patency:  Patent      Post Op Vitals Reviewed: Yes      Staff: CRNA         No complications documented      BP   117/73   Temp 98 2   Pulse 53   Resp 12   SpO2 99

## 2022-04-28 NOTE — ANESTHESIA PREPROCEDURE EVALUATION
Procedure:  foot I&D with possible vac application (Left Foot)    Relevant Problems   CARDIO   (+) Atrial fibrillation (HCC)   (+) Dysrhythmias   (+) HTN (hypertension)      ENDO   (+) Diabetes mellitus, type 2 (HCC)      PULMONARY   (+) Sleep apnea      Other   (+) Infected hematoma        Physical Exam    Airway    Mallampati score: I  TM Distance: >3 FB  Neck ROM: full     Dental       Cardiovascular  Cardiovascular exam normal    Pulmonary  Pulmonary exam normal     Other Findings        Anesthesia Plan  ASA Score- 3     Anesthesia Type- general with ASA Monitors  Additional Monitors:   Airway Plan: LMA  Plan Factors-Exercise tolerance (METS): >4 METS  Chart reviewed  EKG reviewed  Imaging results reviewed  Existing labs reviewed  Patient summary reviewed  Patient is not a current smoker  Induction- intravenous  Postoperative Plan- Plan for postoperative opioid use  Informed Consent- Anesthetic plan and risks discussed with patient  I personally reviewed this patient with the CRNA  Discussed and agreed on the Anesthesia Plan with the CRNA  Isaiah Wynn

## 2022-04-28 NOTE — ASSESSMENT & PLAN NOTE
· Noted histroy s/p ablation in 2017 with return of a fib   · On pradaxa  · Recommend restarting when able   · Coreg for rate control   · Monitor hear rates

## 2022-04-28 NOTE — OP NOTE
OPERATIVE REPORT - Podiatry  PATIENT NAME: Cyndi Wing    :  1970  MRN: [id number]  Pt Location: [facility] OR ROOM 10    SURGERY DATE: 2022    Surgeon(s) and Role:     * Angelika Jaramillo DPM - Primary     * Rolo Fierro DPM - Assisting    Pre-op Diagnosis:  Infected hematoma [T14  8XXA, L08 9]    Post-Op Diagnosis Codes:     * Infected hematoma [T14  8XXA, L08 9]    Procedure:   Left foot incision and drainage with VAC application, 5th metatarsal shaft resection    Specimen(s):  ID Type Source Tests Collected by Time Destination   1 : 5th metatarsal bone Tissue Bone TISSUE EXAM Angelika Jaramillo DPM 2022 1159        Estimated Blood Loss:   Minimal    Drains:  * No LDAs found *    Anesthesia Type:   Choice with 10 ml of 1% Lidocaine and 0 5% Bupivacaine in a 1:1 mixture    Hemostasis:  Pneumatic ankle tourniquet set at 250 mmHg for 22 mins  Direct compression, electrocautery    Materials:  * No implants in log *      Injectables:  None    Operative Findings:  Consistent with Diagnosis    Complications:   None    Procedure and Technique:     Under mild sedation, the patient was brought into the operating room and remained on hospital bed in the supine position  IV sedation was achieved by anesthesia team and a universal timeout was performed where all parties are in agreement of correct patient, correct procedure and correct site  A pneumatic tourniquet was then placed over the patient's left lower extremity with ample padding  A local V block was performed consisting of 10 ml of local anesthetic  The foot was then prepped and draped in the usual aseptic manner  An esmarch bandage was used to exsangunate the foot and the pneumatic tourniquet was then inflated to 250 mmHg  Incision and drainage:  Attention was then directed to left lateral foot prior incision site, all nonviable soft tissue was removed using scalpel and ronguer  Pre-debridement wound measures 0 5 cm x 0 5 cm x 0 3 cm    Pain was controlled by Lack of sensation due to Neuropathy  and local anesthetic  Post debridement measurement 4 cm x 1 cm x 2 cm for a total of <20 square centimeters, with wound appearing Fresh bleeding tissue, viable and granular  100%  of wound debrided  Tissue debrided includes depth of subcutaneous tissue with devitalized tissue being debrided including Fibrous tissue  A small amount of bleeding bleeding was noted during procedure  Hemostasis was achieved using pressure  Distal 5th metatarsal shaft resection:  Attention was then directed to the Distal 5th metatarsal shaft which was cut with sagittal saw in distal dorsal to plantar proximal manner, removing approximately 0 6cm of bone, sent for routine culture  Bone was noted to be hard  No areas of sinus tracks or purulence noted  Wound vac application:  The surgical incision was irrigated with copious amounts of normal sterile saline  Skin was washed and dried and prepped for wound vac  Wound vac was applied with 2 black sponges and set at 125 low continuous without any noted leaks or blockages  The wound vac site was dressed with ABD pad, gauze  This was then covered with a Kerlix and an ACE wrap  The tourniquet was deflated at approximately 22 min and normal hyperemic response was noted to all digits  The patient tolerated the procedure and anesthesia well without immediate complications and transferred to PACU with vital signs stable  As with many limb salvage procedures, we contemplate the possibility of performing further stages to this procedure  Procedures may include debridements, delayed closure, plastic surgery techniques, or more proximal amputations  This procedure may be considered part of a multi-staged limb salvage treatment plan  Dr Denise Mortensen was present during the entire procedure and participated in all key aspects      SIGNATURE: Misti Pinto DPM  DATE: April 28, 2022  TIME: 12:20 PM      Portions of the record may have been created with voice recognition software  Occasional wrong word or "sound a like" substitutions may have occurred due to the inherent limitations of voice recognition software  Read the chart carefully and recognize, using context, where substitutions have occurred

## 2022-04-28 NOTE — H&P
[facility] - H&P  Misa Bajwa 46 y o  male MRN: [id number]  Unit/Bed#: -01 Encounter: [id number]  Admission Date: 04/27/22    ASSESSMENT:    Misa Bajwa is a 46 y o  male with:    1  Left foot infected post op hematoma  2  S/p left 5th metatarsal resection (DOS: 4/8/22)  3  T2DM  4  A-fib  5  HTN    PLAN:    · Patient being admitted under the Podiatry service of Dr Johanna Rendon  · Plan to go to OR for left foot infected surgical site hematoma washout tomorrow pending medical clearance and OR schedule availability  · NPO after midnight  · Begin IV Vancomycin  Outpatient wound culture positive for MRSA  Pharmacy consulted for peaks and troughs monitoring  · F/u xray  · Local wound care consisting of DSD  · Home medications and sliding scale insulin continued until SLIM consult can be appreciated  · SLIM consulted for medical management and surgical clearance  · This plan was discussed with Dr Johanna Rendon  Antibiotics started: Vancomycin  Pharmacologic VTE Prophylaxis: Enoxaparin (Lovenox)   Weight Bearing Status: WBAT, surgical shoe left foot      Disposition:  Patient requires >2 midnight stay for surgical intervention to left foot and IV antibiotics  Code Status: Level 1 - Full Code      SUBJECTIVE    History of Present Illness:    Misa Bajwa is a 46 y o  male with pmh of T2DM, HTN, and Afib who presents with an infected left foot hematoma  He underwent left 5th metatarsal head resection 4/8/22 due to a recurrent ulceration  He followed up outpatient post op with Dr Johanna Rendon and was found to have developed an infected hematoma and was advised to come to the hospital for surgical intervention  Wound cultures were found to be growing MRSA  He denies nausea, vomiting, diarrhea, chills, chest pain, shortness of breath  Review of Systems:    Constitutional: Negative  HENT: Negative  Eyes: Negative  Respiratory: Negative  Cardiovascular: Negative  Gastrointestinal: Negative  Musculoskeletal: Negative  Skin: Left foot surgical wound   Neurological: Negative  Psych: Negative       Past Medical and Surgical History:     Past Medical History:   Diagnosis Date    A-fib [facility])     BPH (benign prostatic hyperplasia)     CPAP (continuous positive airway pressure) dependence     Diabetes mellitus ([address] )     GERD (gastroesophageal reflux disease)     History of COVID-19 01/2022    mild s/s but went into a fib    Hyperlipidemia     Hypertension     MRSA infection 2021    back cyst    Sleep apnea     Tailor's bunion of left foot        Past Surgical History:   Procedure Laterality Date    CARDIAC CATHETERIZATION  2010    [facility]    CARDIAC ELECTROPHYSIOLOGY STUDY AND ABLATION      CARDIOVERSION      CATARACT EXTRACTION Bilateral 07/2021    COLONOSCOPY      NOSE SURGERY      TOE OSTEOTOMY Left 4/8/2022    Procedure: RESECTION LEFT 5TH METATARSAL;  Surgeon: Javon Mayers DPM;  Location:  MAIN OR;  Service: Podiatry       Meds/Allergies:    Medications Prior to Admission   Medication    amLODIPine (NORVASC) 5 mg tablet    atorvastatin (LIPITOR) 40 mg tablet    carvedilol (COREG) 25 mg tablet    chlorthalidone 25 mg tablet    Lantus SoloStar 100 units/mL injection pen    metFORMIN (GLUCOPHAGE) 1000 MG tablet    ramipril (ALTACE) 10 MG capsule    terazosin (HYTRIN) 2 mg capsule    B-D ULTRAFINE III SHORT PEN 31G X 8 MM MISC    Continuous Blood Gluc Sensor (FreeStyle Miah 2 Sensor) MISC    doxycycline (ADOXA) 100 MG tablet    fluticasone (FLONASE) 50 mcg/act nasal spray    furosemide (LASIX) 40 mg tablet    Lyumjev KwikPen 100 UNIT/ML SOPN    potassium chloride (K-DUR,KLOR-CON) 20 mEq tablet    Pradaxa 150 MG capsu       No Known Allergies    Social History:    Social History     Marital Status: /Civil Union    Substance Use History:   Social History     Substance and Sexual Activity   Alcohol Use Yes    Comment: Rare     Social History     Tobacco Use   Smoking Status Never Smoker   Smokeless Tobacco Former User    Types: Chew     Social History     Substance and Sexual Activity   Drug Use Never       Family History:    Family History   Problem Relation Age of Onset    Lung cancer Mother          OBJECTIVE:    First Vitals:   Blood Pressure: 141/86 (04/27/22 1955)  Pulse: 67 (04/27/22 1955)  Temperature: 98 1 °F (36 7 °C) (04/27/22 1955)  SpO2: 96 % (04/27/22 1955)    Current Vitals:   Blood Pressure: 141/86 (04/27/22 1955)  Pulse: 67 (04/27/22 1955)  Temperature: 98 1 °F (36 7 °C) (04/27/22 1955)  SpO2: 96 % (04/27/22 1955)      Physical Exam :    General Appearance: Alert, cooperative, no distress  HEENT: Head normocephalic, atraumatic, without obvious abnormality  Heart: Normal rate and rhythm  Lungs: Non-labored breathing  No respiratory distress  Abdomen: Without distension  Psychiatric: AAOx3  Lower Extremity:  Vascular:    DP & PT pulses palpable B/L  Capillary refill time <3 seconds B/L  Skin temperature WNL B/L  Musculoskeletal:  MMT is 5/5 in all muscle compartments bilaterally  No gross deformities noted  S/p left 5th metatarsal head resection  Dermatological:  No open lesions to right foot  LE Wound Exam:   Wound #: 1  Location: left dorsal lateral foot  Length 0 5cm: Width 0 5cm: Depth 0 3cm:   Deepest Tissue Noted in Base: subcutaneous  Probe to Bone: No  Peripheral Skin Description: Attached  Granulation: 100% Fibrotic Tissue: 0% Necrotic Tissue: 0%   Drainage Amount: minimal, bloody  Signs of Infection: Yes, mild localized erythema and edema    Left 5th metatarsal head resection surgical site is mostly healed distally with a small wound at the proximal edge  There is localized erythema and edema  There is no purulence, no fluctuance, no crepitus, no lymphangitis from the wound  Neurological:  Gross sensation is intact  Protective sensation is diminished  Patient Denies numbness and/or paresthesias      Clinical Images 04/27/22:          Lab Results:   No visits with results within 1 Day(s) from this visit  Latest known visit with results is:   Office Visit on 04/19/2022   Component Date Value    Wound Culture 04/19/2022 4+ Growth of Methicillin Resistant Staphylococcus aureus*    Gram Stain Result 04/19/2022 3+ RBC's*    Gram Stain Result 04/19/2022 No polys seen*    Gram Stain Result 04/19/2022 3+ Gram positive cocci in clusters*       Cultures:            Imaging: I have personally reviewed pertinent films in PACS  No results found  EKG, Pathology, and Other Studies: I have personally reviewed pertinent reports

## 2022-04-28 NOTE — ASSESSMENT & PLAN NOTE
Lab Results   Component Value Date    HGBA1C 13 3 (H) 04/06/2021       Recent Labs     04/28/22  0616 04/28/22  0740   POCGLU 66 75       Blood Sugar Average: Last 72 hrs:  (P) 70 5   · Home insulin 65 AM 90 PM lantus with sliding scale   · Currently on 60 units AM and 70 units HS of lantus with sliding scale   · Decreased given he is on carb controlled diet     · Start lantus tonight   · Continue SSI   · Adjust as needed

## 2022-04-28 NOTE — UTILIZATION REVIEW
Initial Clinical Review    Admission: Date/Time/Statement:   Admission Orders (From admission, onward)     Ordered        04/27/22 2006  Inpatient Admission  Once                      Orders Placed This Encounter   Procedures    Inpatient Admission     Standing Status:   Standing     Number of Occurrences:   1     Order Specific Question:   Level of Care     Answer:   Med Surg [16]     Order Specific Question:   Estimated length of stay     Answer:   More than 2 Midnights     Order Specific Question:   Certification     Answer:   I certify that inpatient services are medically necessary for this patient for a duration of greater than two midnights  See H&P and MD Progress Notes for additional information about the patient's course of treatment  ED Arrival Information     Patient not seen in ED                     Initial Presentation: 46 y o  male, Direct admit from Podiatry services after f/u for outpatient post op care and found to have developed an infected hematoma  Advised to go to the hospital for surgical intervention  Wound cultures were found to be growing MRSA  PMH for T2DM and HTN and Afib, S/p Ablation 2017 S/p Cardioversion in January for Afib  Admit Inpatient level of care for Left foot infected post op hematoma  S/p left 5th metatarsal resection on 4/8/22  Plan to go to OR for left foot infected surgical site hematoma washout tomorrow pending medical clearance and OR schedule availability  NPO after MN  Start Iv antibiotics  F/u xray  SLIM consult for medical management and preop clearance  4/27  Internal Medicine cons; Preop evaluation and Infected hematoma  Planned surgery is left foot infected hematoma washout with general anesthesia planned for tomorrow  Likely an intermediate risk surgery  Noted with elevated Creat of 1 4, 1 16 ub 2017 and suspect CKD  Date: 4/28  Day 2:   Progress notes; Plan for OR today for left foot I&D with Vac applicators, 5th metatarsal bone biopsy  Continue Iv antibiotics  NPO      4/28 OR - S/p Foot I&D with vac application; 5TH metatarsal bone biopsy (Left)    Vitals   Temperature Pulse Respirations Blood Pressure SpO2   04/27/22 1955 04/27/22 1955 04/27/22 2324 04/27/22 1955 04/27/22 1955   98 1 °F (36 7 °C) 67 18 141/86 96 %      Temp Source Heart Rate Source Patient Position - Orthostatic VS BP Location FiO2 (%)   04/28/22 0100 -- -- -- --   Oral          Pain Score       04/28/22 0100       No Pain          Wt Readings from Last 1 Encounters:   04/28/22 120 kg (264 lb 8 8 oz)     Additional Vital Signs:   04/28/22 07:37:43 -- 56 -- 151/90 110 99 %   04/28/22 0100 98 1 °F (36 7 °C) 63 18 147/90 -- --   04/27/22 23:24:55 -- 63 18 147/90 109 98 %     Pertinent Labs/Diagnostic Test Results:   XR foot 3+ vw left   Final Result by Krissy Milian DO (04/28 1059)      Amputation distal 5th metatarsal with findings suspicious for ongoing osteomyelitis at the postoperative margin                 Results from last 7 days   Lab Units 04/27/22  2232   WBC Thousand/uL 7 19   HEMOGLOBIN g/dL 12 5   HEMATOCRIT % 37 6   PLATELETS Thousands/uL 201   NEUTROS ABS Thousands/µL 4 68         Results from last 7 days   Lab Units 04/28/22  0734 04/27/22  2232   SODIUM mmol/L 140 141   POTASSIUM mmol/L 3 6 3 6   CHLORIDE mmol/L 105 105   CO2 mmol/L 31 32   ANION GAP mmol/L 4 4   BUN mg/dL 38* 50*   CREATININE mg/dL 1 28 1 56*   EGFR ml/min/1 73sq m 63 49   CALCIUM mg/dL 9 2 9 3     Results from last 7 days   Lab Units 04/27/22  2232   AST U/L 26   ALT U/L 33   ALK PHOS U/L 108   TOTAL PROTEIN g/dL 7 3   ALBUMIN g/dL 3 6   TOTAL BILIRUBIN mg/dL 0 64     Results from last 7 days   Lab Units 04/28/22  0740 04/28/22  0616   POC GLUCOSE mg/dl 75 66     Results from last 7 days   Lab Units 04/28/22  0734 04/27/22  2232   GLUCOSE RANDOM mg/dL 77 109       Past Medical History:   Diagnosis Date    [facility]fib ([facility] )     BPH (benign prostatic hyperplasia)     CPAP (continuous positive airway pressure) dependence     Diabetes mellitus [facility] [address] [facility] )     GERD (gastroesophageal reflux disease)     History of COVID-19 01/2022    mild s/s but went into a fib    Hyperlipidemia     Hypertension     MRSA infection 2021    back cyst    Sleep apnea     Tailor's bunion of left foot      Present on Admission:   Infected hematoma      Admitting Diagnosis: Infected hematoma [T14  8XXA, L08 9]  Age/Sex: 46 y o  male  Admission Orders:  Scheduled Medications:  amLODIPine, 5 mg, Oral, BID  atorvastatin, 40 mg, Oral, Daily With Dinner  carvedilol, 50 mg, Oral, BID With Meals  chlorthalidone, 50 mg, Oral, Daily  dextrose, , ,   enoxaparin, 30 mg, Subcutaneous, Q12H [address]  insulin glargine, 60 Units, Subcutaneous, Daily With Breakfast   insulin glargine, 70 Units, Subcutaneous, HS  insulin lispro, 1-6 Units, Subcutaneous, Q6H MARY  lisinopril, 10 mg, Oral, BID  terazosin, 4 mg, Oral, BID  vancomycin, 12 5 mg/kg (Adjusted), Intravenous, Q12H      Continuous IV Infusions: None     PRN Meds:  acetaminophen, 650 mg, Oral, Q6H PRN  bupivacaine 0 5% and lidocaine 1%, , , PRN  fluticasone, 2 spray, Each Nare, PRN  ondansetron, 4 mg, Intravenous, Q6H PRN  polyethylene glycol, 17 g, Oral, Daily PRN  sodium chloride, , , PRN        IP CONSULT TO INTERNAL MEDICINE  IP CONSULT TO PHARMACY    Network Utilization Review Department  ATTENTION: Please call with any questions or concerns to [phone] and carefully listen to the prompts so that you are directed to the right person  All voicemails are confidential   Shaunna Zurita all requests for admission clinical reviews, approved or denied determinations and any other requests to dedicated fax number below belonging to the campus where the patient is receiving treatment   List of dedicated fax numbers for the Facilities:  [address] DENIALS (Administrative/Medical Necessity) [phone]   [address] (Maternity/NICU/Pediatrics) [phone]   Yamel Ray 907 [facility] [address] [place] [address]  [phone]   [address] [id number] [place] [address] Azra Maguire [address] [facility] [address] [facility] [phone]

## 2022-04-28 NOTE — CONSULTS
[address] 1970, 46 y o  male MRN: [id number]  Unit/Bed#: -01 Encounter: [id number]  Primary Care Provider: Rashaad Sánchez MD   Date and time admitted to hospital: 4/27/2022  7:14 PM    Inpatient consult to Internal Medicine  Consult performed by: Ximena Sampson DO  Consult ordered by: Jayda Pacheco DPM          * Pre-op evaluation  Assessment & Plan  Medicine team asked Podiatry to perform preop medical risk stratification for patient  Planned surgery is left foot infected hematoma washout with general anesthesia planned for tomorrow  Likely an intermediate risk surgery  Non emergent surgery but urgent  Patient with no symptoms of chest pain at rest or with exertion, no recent or past MI, no symptoms of decompensated heart failure, is not currently having a significant arrhythmia and no history of severe vascular disease  Patient does report a history of AFib for which he underwent an ablation in 2017 and then underwent a cardioversion for AFib in January  Denies any episodes of AFib since then  Patient is currently in a sinus rhythm  RCRI score of 2: Positive for history of DM with insulin, Heart Failure; Negative for high risk surgery, h/o MI, h/o CVA, Pre-Op Cr >2 0  RCRI score of 2 correlates with a 10 1% rate of 30 day cardiac death, nonfatal MI and nonfatal cardiac arrest following surgery  -Do note that patient has an elevated Cr of 1 4 (was 1 16 in 2017) and suspect this is likely CKD  Although patient's creatinine is less than 2, do note increased risk with patient's baseline CKD  -patient reports that he is able to go up several flights of stairs without chest pain or shortness of breath and without needing to stop  Patient also reports that he is able to use his scooter in which he pushes off with his right foot while going through the mall without any symptoms of chest pain or shortness of breath    -Mets of at least > or = 4 and thus patient has at least moderate functional status  -Given patient's ability to perform at least 4 Mets, no further cardiac testing (class 2b) indicated  With patient understanding the risk (10 1% rate of 30 day cardiac death, nonfatal MI and nonfatal cardiac arrest following surgery), it would be acceptable for patient to undergo the surgery  Infected hematoma  Assessment & Plan  Left foot infected post op hematoma  S/p left 5th metatarsal resection (DOS: 4/8/22)  Per Podiatry notes: Plan to go to OR for left foot infected surgical site hematoma washout tomorrow pending medical clearance and OR schedule availability  On IV Vancomycin              Chief Complaint:     Pre-Operative Evaluation, Foot Infection    History of Present Illness:  Arcelia Wilson is a 46 y o  male who has past medical history significant for atrial fibrillation, type 2 diabetes, diastolic heart failure, CKD who presented to [facility] on 04/27 with symptoms of infected left foot hematoma  Podiatry team planning surgical procedure with left foot hematoma washout and requesting medical team preop risk assessment  Patient is currently being treated with IV vancomycin  I spoke with patient who reports that he has been dealing with this left foot pain for several months now and that he is eager to have the procedure performed  I spoke with patient about his past medical history and he reports that he does have a history of AFib for which he underwent ablation 2017  Patient reports that he had no episodes of AFib until this past January when he had COVID and he had 1 episode of AFib for which he required cardioversion  Patient denies any episodes of AFib since and patient is currently in a sinus rhythm a heart rate 63  Patient additionally does report history of insulin-dependent diabetes  Patient also reports that he has a history of diastolic heart failure    Patient denies any history of myocardial infarction or stroke  Patient reports that he is able to easily walk up several flights of stairs at his house without any fatigue, shortness of breath or chest pain  Patient also reports that he uses a scooter in which he pushes off with the right foot while ambulating throughout places like the mall and has no chest pain shortness a breath or fatigued when doing this  Patient does report that he underwent a surgical procedure several weeks ago and that he was cleared for this procedure by both his cardiologist Dr Raudel Urena with [facility] and his PCP outpatient  Patient denies any fevers or chills        Review of Systems:    Constitutional:  Denies fever or chills   Eyes:  Denies change in visual acuity   HENT:  Denies nasal congestion or sore throat   Respiratory:  Denies cough or shortness of breath   Cardiovascular:  Denies chest pain or edema   GI:  Denies abdominal pain or bloody stools  :  Denies dysuria   Musculoskeletal:  Denies back pain or joint pain   Integument:  Denies rash   Neurologic:  Denies headache or sensory changes   Endocrine:  Denies polyuria or polydipsia   Lymphatic:  Denies swollen glands   Psychiatric:  Denies depression or anxiety     Past Medical and Surgical History:   Past Medical History:   Diagnosis Date    [facility]fib ([facility] [address] )     BPH (benign prostatic hyperplasia)     CPAP (continuous positive airway pressure) dependence     Diabetes mellitus ([facility] [address] )     GERD (gastroesophageal reflux disease)     History of COVID-19 01/2022    mild s/s but went into a fib    Hyperlipidemia     Hypertension     MRSA infection 2021    back cyst    Sleep apnea     Tailor's bunion of left foot      Past Surgical History:   Procedure Laterality Date    CARDIAC CATHETERIZATION  2010    [facility]    CARDIAC ELECTROPHYSIOLOGY STUDY AND ABLATION      CARDIOVERSION      CATARACT EXTRACTION Bilateral 07/2021    COLONOSCOPY      NOSE SURGERY      TOE OSTEOTOMY Left 4/8/2022    Procedure: RESECTION LEFT 5TH METATARSAL;  Surgeon: Kulwant Henning DPM;  Location:  MAIN OR;  Service: Podiatry       Meds/Allergies:  Medications Prior to Admission   Medication    amLODIPine (NORVASC) 5 mg tablet    atorvastatin (LIPITOR) 40 mg tablet    carvedilol (COREG) 25 mg tablet    chlorthalidone 25 mg tablet    Lantus SoloStar 100 units/mL injection pen    metFORMIN (GLUCOPHAGE) 1000 MG tablet    ramipril (ALTACE) 10 MG capsule    terazosin (HYTRIN) 2 mg capsule    B-D ULTRAFINE III SHORT PEN 31G X 8 MM MISC    Continuous Blood Gluc Sensor (FreeStyle Miah 2 Sensor) MISC    doxycycline (ADOXA) 100 MG tablet    fluticasone (FLONASE) 50 mcg/act nasal spray    furosemide (LASIX) 40 mg tablet    Lyumjev KwikPen 100 UNIT/ML SOPN    potassium chloride (K-DUR,KLOR-CON) 20 mEq tablet    Pradaxa 150 MG capsu       Allergies: No Known Allergies  History:  Substance Use History:   Social History     Substance and Sexual Activity   Alcohol Use Yes    Comment: Rare     Social History     Tobacco Use   Smoking Status Never Smoker   Smokeless Tobacco Former User    Types: Chew     Social History     Substance and Sexual Activity   Drug Use Never       Family History:  Family History   Problem Relation Age of Onset    Lung cancer Mother        Physical Exam:     Vitals:   Blood Pressure: 147/90 (04/27/22 2324)  Pulse: 63 (04/27/22 2324)  Temperature: 98 1 °F (36 7 °C) (04/27/22 1955)  Respirations: 18 (04/27/22 2324)  SpO2: 98 % (04/27/22 2324)    Constitutional:  Well nourished, non-toxic appearance   Eyes:  PERRL, conjunctiva normal   HENT:  Atraumatic, oropharynx moist  Neck- non-tender   Respiratory:  No respiratory distress, no rales, no wheezing   Cardiovascular:  Normal rate, no murmurs, no gallops, no rubs   GI:  Soft, nondistended, no guarding   :  No costovertebral angle tenderness   Integument:  Well hydrated, no rash   Lymphatic:  No lymphadenopathy noted   Neurologic:  Alert &awake, communicative, CN 2-12 normal,  no focal deficits noted   Psychiatric:  Speech and behavior appropriate       Lab Results: I have personally reviewed pertinent reports  Results from last 7 days   Lab Units 04/27/22  2232   WBC Thousand/uL 7 19   HEMOGLOBIN g/dL 12 5   HEMATOCRIT % 37 6   PLATELETS Thousands/uL 201   NEUTROS PCT % 65   LYMPHS PCT % 22   MONOS PCT % 8   EOS PCT % 4           Invalid input(s): LABALBU          Imaging: I have personally reviewed pertinent reports  XR foot 3+ vw left    Result Date: 4/19/2022  Narrative: LEFT FOOT INDICATION:   [id number]: Other specified postprocedural states  COMPARISON:  Compared with 4/8/2022 VIEWS:  XR FOOT 3+ VW LEFT FINDINGS: Well-healed distal 5th metatarsal resection site  Alignment is maintained  Small calcaneal spur  No lytic or blastic osseous lesion  Soft tissues are unremarkable  Impression: No acute osseous abnormality  Workstation performed: [id number]     XR foot 3+ vw left    Result Date: 4/12/2022  Narrative: LEFT FOOT INDICATION:   Postoperative imaging  COMPARISON:  Compared with 12/2/2021 VIEWS:  XR FOOT 3+ VW LEFT FINDINGS: Resection of the distal 5th metatarsal with postop changes in the soft tissues  Alignment is maintained  No significant degenerative changes  No lytic or blastic osseous lesion  Soft tissues are unremarkable  Impression: Resection of distal 5th metatarsal with gross alignment maintained and postop changes in the soft tissues  Workstation performed: [id number]         ** Please Note: Dragon 360 Dictation voice to text software was used in the creation of this document   **

## 2022-04-28 NOTE — ASSESSMENT & PLAN NOTE
· Left foot infected post op hematoma  · S/p left 5th metatarsal resection (DOS: 4/8/22)  · Per Podiatry notes: Plan to go to OR for left foot infected surgical site hematoma washout tomorrow pending medical clearance and OR schedule availability  · On IV Vancomycin given operative cultures positive for MRSA   · Plan for po bactrim on discharge

## 2022-04-28 NOTE — PLAN OF CARE
Problem: PAIN - ADULT  Goal: Verbalizes/displays adequate comfort level or baseline comfort level  Description: Interventions:  - Encourage patient to monitor pain and request assistance  - Assess pain using appropriate pain scale  - Administer analgesics based on type and severity of pain and evaluate response  - Implement non-pharmacological measures as appropriate and evaluate response  - Consider cultural and social influences on pain and pain management  - Notify physician/advanced practitioner if interventions unsuccessful or patient reports new pain  Outcome: Progressing     Problem: INFECTION - ADULT  Goal: Absence or prevention of progression during hospitalization  Description: INTERVENTIONS:  - Assess and monitor for signs and symptoms of infection  - Monitor lab/diagnostic results  - Monitor all insertion sites, i e  indwelling lines, tubes, and drains  - Monitor endotracheal if appropriate and nasal secretions for changes in amount and color  - [place] appropriate cooling/warming therapies per order  - Administer medications as ordered  - Instruct and encourage patient and family to use good hand hygiene technique  - Identify and instruct in appropriate isolation precautions for identified infection/condition  Outcome: Progressing     Problem: SAFETY ADULT  Goal: Patient will remain free of falls  Description: INTERVENTIONS:  - Educate patient/family on patient safety including physical limitations  - Instruct patient to call for assistance with activity   - Consult OT/PT to assist with strengthening/mobility   - Keep Call bell within reach  - Keep bed low and locked with side rails adjusted as appropriate  - Keep care items and personal belongings within reach  - Initiate and maintain comfort rounds  - Make Fall Risk Sign visible to staff  - Offer Toileting every  Hours, in advance of need  - Initiate/Maintain alarm  - Obtain necessary fall risk management equipment: - Apply yellow socks and bracelet for high fall risk patients  - Consider moving patient to room near nurses station  Outcome: Progressing

## 2022-04-29 LAB
ANION GAP SERPL CALCULATED.3IONS-SCNC: 4 MMOL/L (ref 4–13)
BASOPHILS # BLD AUTO: 0.03 THOUSANDS/ΜL (ref 0–0.1)
BASOPHILS NFR BLD AUTO: 0 % (ref 0–1)
BUN SERPL-MCNC: 26 MG/DL (ref 5–25)
CALCIUM SERPL-MCNC: 9.5 MG/DL (ref 8.3–10.1)
CHLORIDE SERPL-SCNC: 102 MMOL/L (ref 100–108)
CO2 SERPL-SCNC: 31 MMOL/L (ref 21–32)
CREAT SERPL-MCNC: 1.29 MG/DL (ref 0.6–1.3)
EOSINOPHIL # BLD AUTO: 0.23 THOUSAND/ΜL (ref 0–0.61)
EOSINOPHIL NFR BLD AUTO: 3 % (ref 0–6)
ERYTHROCYTE [DISTWIDTH] IN BLOOD BY AUTOMATED COUNT: 13.8 % (ref 11.6–15.1)
GFR SERPL CREATININE-BSD FRML MDRD: 62 ML/MIN/1.73SQ M
GLUCOSE SERPL-MCNC: 164 MG/DL (ref 65–140)
GLUCOSE SERPL-MCNC: 167 MG/DL (ref 65–140)
GLUCOSE SERPL-MCNC: 179 MG/DL (ref 65–140)
GLUCOSE SERPL-MCNC: 192 MG/DL (ref 65–140)
GLUCOSE SERPL-MCNC: 213 MG/DL (ref 65–140)
HCT VFR BLD AUTO: 40 % (ref 36.5–49.3)
HGB BLD-MCNC: 13.8 G/DL (ref 12–17)
IMM GRANULOCYTES # BLD AUTO: 0.04 THOUSAND/UL (ref 0–0.2)
IMM GRANULOCYTES NFR BLD AUTO: 1 % (ref 0–2)
LYMPHOCYTES # BLD AUTO: 0.99 THOUSANDS/ΜL (ref 0.6–4.47)
LYMPHOCYTES NFR BLD AUTO: 11 % (ref 14–44)
MCH RBC QN AUTO: 28.6 PG (ref 26.8–34.3)
MCHC RBC AUTO-ENTMCNC: 34.5 G/DL (ref 31.4–37.4)
MCV RBC AUTO: 83 FL (ref 82–98)
MONOCYTES # BLD AUTO: 0.47 THOUSAND/ΜL (ref 0.17–1.22)
MONOCYTES NFR BLD AUTO: 5 % (ref 4–12)
NEUTROPHILS # BLD AUTO: 6.92 THOUSANDS/ΜL (ref 1.85–7.62)
NEUTS SEG NFR BLD AUTO: 80 % (ref 43–75)
NRBC BLD AUTO-RTO: 0 /100 WBCS
PLATELET # BLD AUTO: 179 THOUSANDS/UL (ref 149–390)
PMV BLD AUTO: 11.5 FL (ref 8.9–12.7)
POTASSIUM SERPL-SCNC: 4.3 MMOL/L (ref 3.5–5.3)
RBC # BLD AUTO: 4.83 MILLION/UL (ref 3.88–5.62)
SODIUM SERPL-SCNC: 137 MMOL/L (ref 136–145)
VANCOMYCIN TROUGH SERPL-MCNC: 11.4 UG/ML (ref 10–20)
WBC # BLD AUTO: 8.68 THOUSAND/UL (ref 4.31–10.16)

## 2022-04-29 PROCEDURE — 80202 ASSAY OF VANCOMYCIN: CPT

## 2022-04-29 PROCEDURE — 85025 COMPLETE CBC W/AUTO DIFF WBC: CPT | Performed by: PHYSICIAN ASSISTANT

## 2022-04-29 PROCEDURE — 97162 PT EVAL MOD COMPLEX 30 MIN: CPT

## 2022-04-29 PROCEDURE — 80048 BASIC METABOLIC PNL TOTAL CA: CPT | Performed by: PHYSICIAN ASSISTANT

## 2022-04-29 PROCEDURE — 82948 REAGENT STRIP/BLOOD GLUCOSE: CPT

## 2022-04-29 PROCEDURE — 97165 OT EVAL LOW COMPLEX 30 MIN: CPT

## 2022-04-29 PROCEDURE — 99232 SBSQ HOSP IP/OBS MODERATE 35: CPT | Performed by: PHYSICIAN ASSISTANT

## 2022-04-29 PROCEDURE — 99024 POSTOP FOLLOW-UP VISIT: CPT | Performed by: PODIATRIST

## 2022-04-29 RX ORDER — INSULIN LISPRO 100 [IU]/ML
2-12 INJECTION, SOLUTION INTRAVENOUS; SUBCUTANEOUS
Status: DISCONTINUED | OUTPATIENT
Start: 2022-04-30 | End: 2022-05-02 | Stop reason: HOSPADM

## 2022-04-29 RX ORDER — LISINOPRIL 20 MG/1
40 TABLET ORAL EVERY EVENING
Status: DISCONTINUED | OUTPATIENT
Start: 2022-04-30 | End: 2022-04-30

## 2022-04-29 RX ORDER — INSULIN GLARGINE 100 [IU]/ML
90 INJECTION, SOLUTION SUBCUTANEOUS
Status: DISCONTINUED | OUTPATIENT
Start: 2022-04-29 | End: 2022-04-29

## 2022-04-29 RX ORDER — METOPROLOL TARTRATE 5 MG/5ML
2.5 INJECTION INTRAVENOUS EVERY 6 HOURS PRN
Status: DISCONTINUED | OUTPATIENT
Start: 2022-04-29 | End: 2022-05-02 | Stop reason: HOSPADM

## 2022-04-29 RX ORDER — INSULIN GLARGINE 100 [IU]/ML
90 INJECTION, SOLUTION SUBCUTANEOUS
Status: DISCONTINUED | OUTPATIENT
Start: 2022-04-29 | End: 2022-05-02 | Stop reason: HOSPADM

## 2022-04-29 RX ORDER — RAMIPRIL 5 MG/1
20 CAPSULE ORAL
Status: DISCONTINUED | OUTPATIENT
Start: 2022-04-29 | End: 2022-04-29 | Stop reason: RX

## 2022-04-29 RX ORDER — LISINOPRIL 20 MG/1
80 TABLET ORAL DAILY
Status: DISCONTINUED | OUTPATIENT
Start: 2022-04-29 | End: 2022-04-29 | Stop reason: SDUPTHER

## 2022-04-29 RX ORDER — METOPROLOL TARTRATE 5 MG/5ML
5 INJECTION INTRAVENOUS EVERY 6 HOURS PRN
Status: DISCONTINUED | OUTPATIENT
Start: 2022-04-29 | End: 2022-04-29

## 2022-04-29 RX ORDER — RAMIPRIL 5 MG/1
10 CAPSULE ORAL EVERY EVENING
Status: DISCONTINUED | OUTPATIENT
Start: 2022-04-29 | End: 2022-04-29 | Stop reason: RX

## 2022-04-29 RX ORDER — LISINOPRIL 20 MG/1
40 TABLET ORAL
Status: DISCONTINUED | OUTPATIENT
Start: 2022-04-30 | End: 2022-04-30

## 2022-04-29 RX ORDER — LISINOPRIL 20 MG/1
40 TABLET ORAL
Status: DISCONTINUED | OUTPATIENT
Start: 2022-04-29 | End: 2022-04-29

## 2022-04-29 RX ORDER — LISINOPRIL 20 MG/1
40 TABLET ORAL EVERY EVENING
Status: DISCONTINUED | OUTPATIENT
Start: 2022-04-29 | End: 2022-04-29

## 2022-04-29 RX ADMIN — INSULIN LISPRO 4 UNITS: 100 INJECTION, SOLUTION INTRAVENOUS; SUBCUTANEOUS at 11:38

## 2022-04-29 RX ADMIN — CARVEDILOL 50 MG: 25 TABLET, FILM COATED ORAL at 17:01

## 2022-04-29 RX ADMIN — CARVEDILOL 50 MG: 25 TABLET, FILM COATED ORAL at 04:49

## 2022-04-29 RX ADMIN — VANCOMYCIN HYDROCHLORIDE 1250 MG: 10 INJECTION, POWDER, LYOPHILIZED, FOR SOLUTION INTRAVENOUS at 10:51

## 2022-04-29 RX ADMIN — TERAZOSIN HYDROCHLORIDE 4 MG: 1 CAPSULE ORAL at 04:49

## 2022-04-29 RX ADMIN — INSULIN GLARGINE 60 UNITS: 100 INJECTION, SOLUTION SUBCUTANEOUS at 07:30

## 2022-04-29 RX ADMIN — AMLODIPINE BESYLATE 5 MG: 5 TABLET ORAL at 04:49

## 2022-04-29 RX ADMIN — AMLODIPINE BESYLATE 5 MG: 5 TABLET ORAL at 17:01

## 2022-04-29 RX ADMIN — ATORVASTATIN CALCIUM 40 MG: 40 TABLET, FILM COATED ORAL at 17:01

## 2022-04-29 RX ADMIN — LISINOPRIL 80 MG: 20 TABLET ORAL at 08:38

## 2022-04-29 RX ADMIN — TERAZOSIN HYDROCHLORIDE 4 MG: 1 CAPSULE ORAL at 18:10

## 2022-04-29 RX ADMIN — INSULIN LISPRO 2 UNITS: 100 INJECTION, SOLUTION INTRAVENOUS; SUBCUTANEOUS at 11:38

## 2022-04-29 RX ADMIN — INSULIN LISPRO 1 UNITS: 100 INJECTION, SOLUTION INTRAVENOUS; SUBCUTANEOUS at 07:30

## 2022-04-29 RX ADMIN — CHLORTHALIDONE 50 MG: 25 TABLET ORAL at 04:49

## 2022-04-29 RX ADMIN — INSULIN GLARGINE 90 UNITS: 100 INJECTION, SOLUTION SUBCUTANEOUS at 18:10

## 2022-04-29 RX ADMIN — INSULIN LISPRO 2 UNITS: 100 INJECTION, SOLUTION INTRAVENOUS; SUBCUTANEOUS at 07:33

## 2022-04-29 RX ADMIN — DABIGATRAN ETEXILATE MESYLATE 150 MG: 150 CAPSULE ORAL at 04:49

## 2022-04-29 NOTE — PLAN OF CARE
Problem: PAIN - ADULT  Goal: Verbalizes/displays adequate comfort level or baseline comfort level  Description: Interventions:  - Encourage patient to monitor pain and request assistance  - Assess pain using appropriate pain scale  - Administer analgesics based on type and severity of pain and evaluate response  - Implement non-pharmacological measures as appropriate and evaluate response  - Consider cultural and social influences on pain and pain management  - Notify physician/advanced practitioner if interventions unsuccessful or patient reports new pain  Outcome: Progressing     Problem: SAFETY ADULT  Goal: Patient will remain free of falls  Description: INTERVENTIONS:  - Educate patient/family on patient safety including physical limitations  - Instruct patient to call for assistance with activity   - Consult OT/PT to assist with strengthening/mobility   - Keep Call bell within reach  - Keep bed low and locked with side rails adjusted as appropriate  - Keep care items and personal belongings within reach  - Initiate and maintain comfort rounds  - Make Fall Risk Sign visible to staff  - Offer Toileting every  Hours, in advance of need  - Initiate/Maintain alarm  - Obtain necessary fall risk management equipment:   - Apply yellow socks and bracelet for high fall risk patients  - Consider moving patient to room near nurses station  Outcome: Progressing     Problem: Knowledge Deficit  Goal: Patient/family/caregiver demonstrates understanding of disease process, treatment plan, medications, and discharge instructions  Description: Complete learning assessment and assess knowledge base    Interventions:  - Provide teaching at level of understanding  - Provide teaching via preferred learning methods  Outcome: Progressing     Problem: Potential for Falls  Goal: Patient will remain free of falls  Description: INTERVENTIONS:  - Educate patient/family on patient safety including physical limitations  - Instruct patient to call for assistance with activity   - Consult OT/PT to assist with strengthening/mobility   - Keep Call bell within reach  - Keep bed low and locked with side rails adjusted as appropriate  - Keep care items and personal belongings within reach  - Initiate and maintain comfort rounds  - Make Fall Risk Sign visible to staff  - Offer Toileting every Hours, in advance of need  - Initiate/Maintain alarm  - Obtain necessary fall risk management equipment:   - Apply yellow socks and bracelet for high fall risk patients  - Consider moving patient to room near nurses station  Outcome: Progressing

## 2022-04-29 NOTE — PROGRESS NOTES
Podiatry - Progress Note  Patient: Franklin Weber 46 y o  male   MRN: [id number]  PCP: Maicol Pierson MD  Unit/Bed#: -01 Encounter: [id number]  Date Of Visit: 22    ASSESSMENT:    Franklin eWber is a 46 y o  male with:    1  Left foot infected post op hematoma   -S/p left foot washout with resection of 5th metatarsal (DOS 22)  2  S/p left 5th metatarsal resection (DOS: 22)  3  T2DM  4  A-fib  5  HTN        PLAN:    · Plan for delayed primary closure on  with Dr Shailesh Mims, time TBD  NPO at midnight before surgery, consent was signed  The patient was agreeable to the procedure  Benefits, risks, complications and concerns of procedure discussed with the patients understanding  · Post-surgical dressing located on surgical site and affected extremity were left intact today  · Will plan for first complete post-surgical dressing change tomorrow  · Pain is well controlled  Continue current pain management regimen  · Elevation on green foam wedges or pillows when non-ambulatory  · Rest of care per primary team     Weightbearing status: NWB to LLE       SUBJECTIVE:     The patient was seen, evaluated, and assessed at bedside today  The patient was awake, alert, and in no acute distress  The patient reports no pain at this time  Pain is well controlled with current pain management regimen  Patient reports normal appetite and using the restroom postoperatively  Patient denies N/V/F/chills/SOB/CP  OBJECTIVE:     Vitals:   /76   Pulse (!) 150   Temp (!) 97 4 °F (36 3 °C) (Oral)   Resp 17   Ht 6' (1 829 m)   Wt 120 kg (264 lb 8 8 oz)   SpO2 95%   BMI 35 88 kg/m²     Temp (24hrs), Av 9 °F (36 6 °C), Min:97 4 °F (36 3 °C), Max:98 4 °F (36 9 °C)      Physical Exam:     General:  Alert, cooperative, and in no distress  Lower extremity exam:  Cardiovascular status at baseline  Neurological status at baseline  Musculoskeletal status at baseline   No erythema, edema, or lymphangitis noted from dressing  Lower extremity temperature WNL  Wound vac running well at 125mmHg low continuous without any noted leaks or blockages  Additional Data:     Labs:    Results from last 7 days   Lab Units 04/29/22  0523   WBC Thousand/uL 8 68   HEMOGLOBIN g/dL 13 8   HEMATOCRIT % 40 0   PLATELETS Thousands/uL 179   NEUTROS PCT % 80*   LYMPHS PCT % 11*   MONOS PCT % 5   EOS PCT % 3     Results from last 7 days   Lab Units 04/29/22  0523 04/28/22  0734 04/27/22  2232   POTASSIUM mmol/L 4 3   < > 3 6   CHLORIDE mmol/L 102   < > 105   CO2 mmol/L 31   < > 32   BUN mg/dL 26*   < > 50*   CREATININE mg/dL 1 29   < > 1 56*   CALCIUM mg/dL 9 5   < > 9 3   ALK PHOS U/L  --   --  108   ALT U/L  --   --  33   AST U/L  --   --  26    < > = values in this interval not displayed  * I Have Reviewed All Lab Data Listed Above  Imaging: I have personally reviewed pertinent films in PACS  EKG, Pathology, and Other Studies: I have personally reviewed pertinent reports  ** Please Note: Portions of the record may have been created with voice recognition software  Occasional wrong word or "sound a like" substitutions may have occurred due to the inherent limitations of voice recognition software  Read the chart carefully and recognize, using context, where substitutions have occurred   **

## 2022-04-29 NOTE — ASSESSMENT & PLAN NOTE
· Left foot infected post op hematoma  · S/p left 5th metatarsal resection (DOS: 4/8/22)  · S/p washout 4/29 plan for repeat washout tomorrow   · On IV Vancomycin given operative cultures positive for MRSA   · Plan for po bactrim on discharge

## 2022-04-29 NOTE — OCCUPATIONAL THERAPY NOTE
Occupational Therapy Evaluation     Patient Name: Kelechi Parrish  ODNNW'G Date: 4/29/2022  Problem List  Principal Problem:    Pre-op evaluation  Active Problems:    HTN (hypertension)    Atrial fibrillation ([place] [address] )    Diabetes mellitus, type 2 ([place] [address] )    Infected hematoma    Past Medical History  Past Medical History:   Diagnosis Date    A-fib ([place] [address] )     BPH (benign prostatic hyperplasia)     CPAP (continuous positive airway pressure) dependence     Diabetes mellitus ([place] [address] )     GERD (gastroesophageal reflux disease)     History of COVID-19 01/2022    mild s/s but went into a fib    Hyperlipidemia     Hypertension     MRSA infection 2021    back cyst    Sleep apnea     Tailor's bunion of left foot      Past Surgical History  Past Surgical History:   Procedure Laterality Date    CARDIAC CATHETERIZATION  2010    NCA    CARDIAC ELECTROPHYSIOLOGY STUDY AND ABLATION      CARDIOVERSION      CATARACT EXTRACTION Bilateral 07/2021    COLONOSCOPY      INCISION AND DRAINAGE OF WOUND Left 4/28/2022    Procedure: foot I&D with vac application; 5TH metatarsal bone biopsy;  Surgeon: Seven Reed DPM;  [facility];  Service: Podiatry    NOSE SURGERY      TOE OSTEOTOMY Left 4/8/2022    Procedure: RESECTION LEFT 5TH METATARSAL;  Surgeon: Seven Reed DPM;  [facility];  Service: Podiatry        04/29/22 1100   OT Last Visit   OT Visit Date 04/29/22   Note Type   Note type Evaluation   Restrictions/Precautions   Weight Bearing Precautions Per Order Yes   LLE Weight Bearing Per Order NWB   Other Precautions Fall Risk;Multiple lines;WBS  (L LE wound vac)   Pain Assessment   Pain Assessment Tool FLACC   Pain Rating: FLACC (Rest) - Face 0   Pain Rating: FLACC (Rest) - Legs 0   Pain Rating: FLACC (Rest) - Activity 0   Pain Rating: FLACC (Rest) - Cry 0   Pain Rating: FLACC (Rest) - Consolability 0   Score: FLACC (Rest) 0   Pain Rating: FLACC (Activity) - Face 0   Pain Rating: FLACC (Activity) - Legs 0   Pain Rating: FLACC (Activity) - Activity 0   Pain Rating: FLACC (Activity) - Cry 0   Pain Rating: FLACC (Activity) - Consolability 0   Score: FLACC (Activity) 0   Home Living   Type of Home House  [facility] with 2STE)   Home Layout Two level   Bathroom Shower/Tub Walk-in shower   Bathroom Toilet Standard   Bathroom Equipment Built-in shower seat   Home Equipment Crutches  (One knee scooter)   Additional Comments Pt resides with his wife and daughter in a [facility] with 2STE  He has been managing with NWB status L LE and wife is able to assist if he needs it   Prior Function   Level of [place] Independent with ADLs and functional mobility   Lives With Spouse;Daughter   Receives Help From Family   ADL Assistance Independent   IADLs Needs assistance   Lifestyle   Autonomy Reports he is managing ADLs and functional mobility/transfers I'ly  Use of knee scooter   Reciprocal Relationships wife able to assist if needed   Psychosocial   Psychosocial (WDL)   (cooperative but apprehensive)   Subjective   Subjective "I'm fine"   ADL   Eating Assistance 7  Independent   Grooming Assistance 7  Independent   UB Bathing Assistance 7  Independent   LB Bathing Assistance 5  Supervision/Setup   UB Dressing Assistance 7  Independent   LB Dressing Assistance 5  [address]  7  Independent   Bed Mobility   Supine to Sit 6  Modified independent   Sit to Supine 6  Modified independent   Transfers   Sit to Stand 5  Supervision   Stand to Sit 5  Supervision   Additional Comments Transitions onto one knee scooter and mobilizes within room with supervision     Balance   Static Sitting Normal   Dynamic Sitting Good   Static Standing Fair -   Activity Tolerance   Medical Staff Made Aware PT Ama   Nurse Made Aware Pt cleared by CASANDRA Jolly for OT evaluation and OOB mobility   RUE Assessment   RUE Assessment WFL   LUE Assessment   LUE Assessment WFL   Cognition   Overall Cognitive Status [facility]   Arousal/Participation Alert; Responsive   Attention Within functional limits   Orientation Level Oriented X4   Memory Within functional limits   Following Commands Follows all commands and directions without difficulty   Comments Pt is pleasant but apprehensive to participate  No significant cognitive concerns   Assessment   Prognosis Good   Assessment Pt is a 46year old male seen for initial OT evaluation s/p admission to [facility] 4/27/22 with L foot infection post op hematoma  POD1 s/p L foot washout with resection of 5th metatarsal with wound vac applied  Additional active problems include: DM, atrial fibrillation, and HTN  Pt is NWB L LE  Pt resides with his wife in a [facility] with 2STE  Pt was NWB prior to admission and reports being I with ADLs, functional transfers with use of knee scooter  Pt is currently functioning at his recent functional baseline, and is able to continue to function safely at home upon d/c  No further acute OT needs and OT orders to be d/c at this time  Recommend continued participation in self care and functional mobility while hospitalized to prevent deconditioning and functional decline  Goals   Patient Goals to go home   Recommendation   OT Discharge Recommendation No rehabilitation needs   OT - OK to Discharge Yes   AM-PAC Daily Activity Inpatient   Lower Body Dressing 3   Bathing 3   Toileting 4   Upper Body Dressing 4   Grooming 4   Eating 4   Daily Activity Raw Score 22   Daily Activity Standardized Score (Calc for Raw Score >=11) 47  [address] [facility]   Following a Speech/Presentation 4   Understanding Ordinary Conversation 4   Taking Medications 4   Remembering Where Things Are Placed or Put Away 4   Remembering List of 4-5 Errands 4   Taking Care of Complicated Tasks 4   Applied Cognition Raw Score 24   Applied Cognition Standardized Score 62 21     Danielle , MOT, OTR/L, CSRS

## 2022-04-29 NOTE — PROGRESS NOTES
[facility]  Progress Note - Ashley Melgar 1970, 46 y o  male MRN: [id number]  Unit/Bed#: -01 Encounter: [id number]  Primary Care Provider: Bart Casillas MD   Date and time admitted to hospital: 4/27/2022  7:14 PM    * Pre-op evaluation  Assessment & Plan  · Pre op eval patient appropriate for surgery per chart review   · Plan for OR today 4/28            Infected hematoma  Assessment & Plan  · Left foot infected post op hematoma  · S/p left 5th metatarsal resection (DOS: 4/8/22)  · S/p washout 4/29 plan for repeat washout tomorrow   · On IV Vancomycin given operative cultures positive for MRSA   · Plan for po bactrim on discharge     Atrial fibrillation ([facility] [address] )  Assessment & Plan  · Noted histroy s/p ablation in 2017 with return of a fib   · On pradaxan restarted yesterday 4/29  · Coreg for rate control   · Monitor hear rates   · Noted to have episode of RVR yesterday, however have since stabilized  Diabetes mellitus, type 2 [facility])  Assessment & Plan  Lab Results   Component Value Date    HGBA1C 13 3 (H) 04/06/2021       Recent Labs     04/28/22  1351 04/28/22  1635 04/28/22  2120 04/29/22  0716   POCGLU 98 206* 136 167*       Blood Sugar Average: Last 72 hrs:  (P) 265 [id number]   · Home insulin 65 AM 90 PM lantus with sliding scale   · Currently on 60 units AM and changed to 90 units pm per patient request    · Continue SSI   · Adjust as needed   · May need to decrease morning dose to 30 units if patient is to be made npo     HTN (hypertension)  Assessment & Plan  · Reviewed and stable   · Continue home bp meds         VTE Pharmacologic Prophylaxis: VTE Score: 2 Low Risk (Score 0-2) - Encourage Ambulation  Patient Centered Rounds: I performed bedside rounds with nursing staff today  Discussions with Specialists or Other Care Team Provider: podiatry     Education and Discussions with Family / Patient: Updated  (wife) at bedside      Time Spent for Care: 45 minutes  More than 50% of total time spent on counseling and coordination of care as described above  Current Length of Stay: 2 day(s)  Current Patient Status: Inpatient   Certification Statement: The patient will continue to require additional inpatient hospital stay due to pending podiatry  will go for repeat washout tomorrow   Discharge Plan: [facility] Johnstonjuan is following this patient on consult  They are medically stable for discharge when deemed appropriate by primary service  Code Status: Level 1 - Full Code    Subjective:   Patient reports minimal pain in his left foot  He is concerned regarding his medications and was debating whether he wanted to bring in his home ramipril versus take the lisinopril that is being given to him here as his home ramipril is non formulary  Objective:     Vitals:   Temp (24hrs), Av 9 °F (36 6 °C), Min:97 4 °F (36 3 °C), Max:98 4 °F (36 9 °C)    Temp:  [97 4 °F (36 3 °C)-98 4 °F (36 9 °C)] 97 4 °F (36 3 °C)  HR:  [] 150  Resp:  [8-22] 17  BP: (107-162)/() 128/76  SpO2:  [92 %-99 %] 95 %  Body mass index is 35 88 kg/m²  Input and Output Summary (last 24 hours): Intake/Output Summary (Last 24 hours) at 2022 1123  Last data filed at 2022 0401  Gross per 24 hour   Intake 1000 ml   Output 900 ml   Net 100 ml       Physical Exam:   Physical Exam  Vitals and nursing note reviewed  Constitutional:       General: He is not in acute distress  HENT:      Head: Normocephalic and atraumatic  Mouth/Throat:      Mouth: Mucous membranes are moist       Pharynx: Oropharynx is clear  No oropharyngeal exudate  Eyes:      General:         Right eye: No discharge  Left eye: No discharge  Conjunctiva/sclera: Conjunctivae normal       Pupils: Pupils are equal, round, and reactive to light  Cardiovascular:      Rate and Rhythm: Normal rate and regular rhythm  Pulses: Normal pulses  Heart sounds: Normal heart sounds   No murmur heard  Pulmonary:      Effort: Pulmonary effort is normal  No respiratory distress  Breath sounds: Normal breath sounds  No wheezing or rales  Abdominal:      General: Abdomen is flat  There is no distension  Palpations: Abdomen is soft  Tenderness: There is no abdominal tenderness  Musculoskeletal:      Cervical back: No muscular tenderness  Right lower leg: No edema  Left lower leg: No edema  Feet:      Comments: Dressing to left foot with drain in place with serosanguineous drainage  Skin:     General: Skin is warm and dry  Capillary Refill: Capillary refill takes less than 2 seconds  Coloration: Skin is not jaundiced  Neurological:      General: No focal deficit present  Mental Status: He is alert and oriented to person, place, and time  Cranial Nerves: No cranial nerve deficit  Psychiatric:         Mood and Affect: Mood normal           Additional Data:     Labs:  Results from last 7 days   Lab Units 04/29/22  0523   WBC Thousand/uL 8 68   HEMOGLOBIN g/dL 13 8   HEMATOCRIT % 40 0   PLATELETS Thousands/uL 179   NEUTROS PCT % 80*   LYMPHS PCT % 11*   MONOS PCT % 5   EOS PCT % 3     Results from last 7 days   Lab Units 04/29/22  0523 04/28/22  0734 04/27/22  2232   SODIUM mmol/L 137   < > 141   POTASSIUM mmol/L 4 3   < > 3 6   CHLORIDE mmol/L 102   < > 105   CO2 mmol/L 31   < > 32   BUN mg/dL 26*   < > 50*   CREATININE mg/dL 1 29   < > 1 56*   ANION GAP mmol/L 4   < > 4   CALCIUM mg/dL 9 5   < > 9 3   ALBUMIN g/dL  --   --  3 6   TOTAL BILIRUBIN mg/dL  --   --  0 64   ALK PHOS U/L  --   --  108   ALT U/L  --   --  33   AST U/L  --   --  26   GLUCOSE RANDOM mg/dL 179*   < > 109    < > = values in this interval not displayed           Results from last 7 days   Lab Units 04/29/22  0716 04/28/22  2120 04/28/22  1635 04/28/22  1351 04/28/22  0740 04/28/22  0616   POC GLUCOSE mg/dl 167* 136 206* 98 75 66               Lines/Drains:  Invasive Devices Report    Peripheral Intravenous Line            Peripheral IV 04/27/22 Dorsal (posterior); Left Forearm 1 day                      Imaging: No pertinent imaging reviewed      Recent Cultures (last 7 days):         Last 24 Hours Medication List:   Current Facility-Administered Medications   Medication Dose Route Frequency Provider Last Rate    acetaminophen  650 mg Oral Q4H PRN Lepe Zack, DPM      albuterol  2 5 mg Nebulization Once PRN Wilman Butler, CRNA      amLODIPine  5 mg Oral BID Bishop Dixon, DPM      atorvastatin  40 mg Oral Daily With [address] [place], MARSHA      carvedilol  50 mg Oral BID With Meals Bishop Dixon, DPISABELLA      chlorthalidone  50 mg Oral Daily Paxton Love      fentaNYL  50 mcg Intravenous Q10 Min PRN Wilman Butler CRNA      fluticasone  2 spray Each Nare PRN Bishop Dixon, DPISABELLA      HYDROmorphone  0 5 mg Intravenous Q1H PRN Bishop Brennanob, DPM      insulin glargine  60 Units Subcutaneous Daily With Breakfast Bishop Zack DPM      insulin glargine  90 Units Subcutaneous HS Ramiro Saba PA-C      insulin lispro  1-6 Units Subcutaneous Q6H [address] Paxton Love      insulin lispro  2-12 Units Subcutaneous TID AC Ramiro Saba PA-C      [START ON 4/30/2022] lisinopril  40 mg Oral Early Morning Mckenna Lopez, DPM      [START ON 4/30/2022] lisinopril  40 mg Oral QPM Mckenna Lopez, DPM      naloxone  0 04 mg Intravenous Q1MIN PRN Bishop Dixon, DPM      ondansetron  4 mg Intravenous Q6H PRN Bishop Dixon, DPM      ondansetron  4 mg Intravenous Once PRN Wilman Butler CRNA      oxyCODONE  10 mg Oral Q4H PRN Lepe Zack, DPM      oxyCODONE  5 mg Oral Q4H PRN Lepe Zack, DPM      polyethylene glycol  17 g Oral Daily PRN Lepe Zack, DPM      terazosin  4 mg Oral BID Lepe Zack, DPM      vancomycin  12 5 mg/kg (Adjusted) Intravenous Q12H Lepe Zack, DPM 1,250 mg (04/29/22 1051)        Today, Patient Was Seen By: Agus Gray PA-C    **Please Note: This note may have been constructed using a voice recognition system  **

## 2022-04-29 NOTE — PROGRESS NOTES
Patient requesting to take his Lantus and Terazosin now  Ilir Castellanos MD with podiatry notified and gave nursing permission to give early  Med times adjusted

## 2022-04-29 NOTE — ASSESSMENT & PLAN NOTE
· Noted histroy s/p ablation in 2017 with return of a fib   · On pradaxan restarted yesterday 4/29  · Coreg for rate control   · Monitor hear rates   · Noted to have episode of RVR yesterday, however have since stabilized

## 2022-04-29 NOTE — NURSING NOTE
Pt  Pulse was elevated overnight ranging from   He became anxious about it and was convinced it was because of the times at which he has been getting his medication in the hospital   He stated he always takes his medication at 5am before work  Contacted podiatry and was given the approval to change times for his morning medication to 5am   Pt refused lisinopril because that is not what he takes at home and wanted ramipril instead  Pharmacy doesn't have ramipril in house  Will continue to monitor

## 2022-04-29 NOTE — PROGRESS NOTES
Vancomycin IV Pharmacy-to-Dose Consultation    Kelechi Parrish is a 46 y o  male who is currently receiving Vancomycin IV with management by the Pharmacy Consult service  Assessment/Plan:  The patient was reviewed  Renal function is stable and no signs or symptoms of nephrotoxicity and/or infusion reactions were documented in the chart  Based on todays assessment, continue current vancomycin (day3) dosing of 1250mg every 12 hours with a plan for trough to be drawn at on 05/01/22 at 10 30  We will continue to follow the patients culture results and clinical progress daily      Lynnette Johns, Pharmacist

## 2022-04-29 NOTE — PHYSICAL THERAPY NOTE
Physical Therapy Evaluation    Patient's Name: Patti Ace    Admitting Diagnosis  Infected hematoma [T14  8XXA, L08 9]    Problem List  Patient Active Problem List   Diagnosis    Esophageal thickening    Screen for colon cancer    Dysrhythmias    HTN (hypertension)    Atrial fibrillation ([place] [address] )    Diabetes mellitus, type 2 ([place] [address] )    Sleep apnea    Infected hematoma    Pre-op evaluation       Past Medical History  Past Medical History:   Diagnosis Date    A-fib ([place] [address] )     BPH (benign prostatic hyperplasia)     CPAP (continuous positive airway pressure) dependence     Diabetes mellitus ([place] [address] )     GERD (gastroesophageal reflux disease)     History of COVID-19 01/2022    mild s/s but went into a fib    Hyperlipidemia     Hypertension     MRSA infection 2021    back cyst    Sleep apnea     Tailor's bunion of left foot        Past Surgical History  Past Surgical History:   Procedure Laterality Date    CARDIAC CATHETERIZATION  2010    NCA    CARDIAC ELECTROPHYSIOLOGY STUDY AND ABLATION      CARDIOVERSION      CATARACT EXTRACTION Bilateral 07/2021    COLONOSCOPY      INCISION AND DRAINAGE OF WOUND Left 4/28/2022    Procedure: foot I&D with vac application; 5TH metatarsal bone biopsy;  Surgeon: Ramiro Piper DPM;  [facility];  Service: Podiatry    NOSE SURGERY      TOE OSTEOTOMY Left 4/8/2022    Procedure: RESECTION LEFT 5TH METATARSAL;  Surgeon: Ramiro Piper DPM;  Location:  MAIN OR;  Service: Podiatry        04/29/22 1036   PT Last Visit   PT Visit Date 04/29/22   Pain Assessment   Pain Assessment Tool FLACC   Pain Rating: FLACC (Rest) - Face 0   Pain Rating: FLACC (Rest) - Legs 0   Pain Rating: FLACC (Rest) - Activity 0   Pain Rating: FLACC (Rest) - Cry 0   Pain Rating: FLACC (Rest) - Consolability 0   Score: FLACC (Rest) 0   Pain Rating: FLACC (Activity) - Face 0   Pain Rating: FLACC (Activity) - Legs 0   Pain Rating: FLACC (Activity) - Activity 0   Pain Rating: FLACC (Activity) - Cry 0 Pain Rating: FLACC (Activity) - Consolability 0   Score: FLACC (Activity) 0   Restrictions/Precautions   Weight Bearing Precautions Per Order Yes   LLE Weight Bearing Per Order NWB   Other Precautions Multiple lines; Fall Risk;WBS  (wound vac)   Home Living   Type of [address] [place] [address] Two level  (2 YAMILE)   Home Equipment Crutches; Other (Comment)  (knee scooter)   Additional Comments Pt reports living in a [facility] with 2 YAMILE  Reports that he has been NWBing on LLE and using knee scooter to get around at home  Pt reports bumping up stairs to 2nd floor bed/bath   Prior Function   Level of [place] Independent with ADLs and functional mobility   Lives With Spouse;Daughter   Receives Help From Family   ADL Assistance Independent   IADLs Needs assistance   General   Family/Caregiver Present Yes   Cognition   Overall Cognitive Status WFL   Attention Within functional limits   Orientation Level Oriented X4   Memory Within functional limits   Following Commands Follows all commands and directions without difficulty   RLE Assessment   RLE Assessment WFL   LLE Assessment   LLE Assessment X  (hip and knee WFL, ankle not assessed)   Bed Mobility   Supine to Sit 6  Modified independent   Additional items HOB elevated   Sit to Supine 6  Modified independent   Additional items HOB elevated   Transfers   Sit to Stand 5  Supervision   Stand to Sit 5  Supervision   Additional Comments pt able to stand up on RLE and transition onto knee scooter    Ambulation/Elevation   Distance Pt able to mobilize at supervision level on knee scooter within his room; declined stair trial, denies concerns about navigating his stairs   Balance   Static Sitting Normal   Dynamic Sitting Good   Static Standing Fair -   Activity Tolerance   Activity Tolerance Patient tolerated treatment well   Medical Staff Made Aware Seen with OT 2* pt's medical complexity and multiple comorbidities   PT focus on funtional transfers, RLE standing tolerance, and safe mobilization   Nurse Made Aware ok to see per RN   Assessment   Prognosis Good   Assessment Pt is a 46 y o  male seen for PT evaluation s/p [facility] to [facility]22  Pt was admitted with a primary dx of: left foot infected post op hematoma  Pt is s/p L 5th metatarsal resection on 4/8/22  Pt is s/p L foot washout with resection of 5th metatarsal with wound vac applied  PT now consulted for assessment of mobility and d/c needs  Pt with Ambulate, PT evaluation orders  Pt has active NWBing LLE orders at this time  Pts current comorbidities effecting treatment include: a-fib, DM, HTN, hx of COVID-19, hx of MRSA infection  Pts current clinical presentation is Evolving (medium complexity) due to Ongoing medical management for primary dx, Decreased activity tolerance compared to baseline, Current WBS, Trending lab values, s/p surgical intervention, wound vac in place  Prior to admission, pt was reports being independent with ADLs and mod (I) with use of knee scooter  Pt reports being NWBing with LLE PTA  Pt lives with his wife in a [facility] with 2 YAMILE, was bumping up the stairs to maintain his WBing restrictions  Upon evaluation, pt currently is at mod (I) level for bed mobility; supervision for transfers; supervision for ambulation with knee scooter within room  Pt declines stair trial, denying concerns about navigating his stairs at home  Pt presents at PT eval functioning at/near baseline mobility level  No further acute PT needs at this time, PT signing off  At conclusion of PT session pt returned BTB with phone and call bell within reach  Pt denies any further questions at this time  The patient's AM-PAC Basic Mobility Inpatient Short Form Raw Score is 20  A Raw score of greater than 16 suggests the patient may benefit from discharge to home  Please also refer to the recommendation of the Physical Therapist for safe discharge planning  Recommend home with continued social upon hospital D/C     Goals Patient Goals to go home   Plan   PT Frequency Other (Comment)  (1x visit, D/C PT)   Recommendation   PT Discharge Recommendation No rehabilitation needs   Equipment Recommended   (pt already owns all necessary DME)   [place] 8 in Bed Without Bedrails 4   Lying on Back to Sitting on Edge of Flat Bed 4   Moving Bed to Chair 3   Standing Up From Chair 3   Walk in Room 3   Climb 3-5 Stairs 3   Basic Mobility Inpatient Raw Score 20   Basic Mobility Standardized Score 43 99   Highest Level Of Mobility   -[facility] Goal 6: Walk 10 steps or more   -[facility] Highest Level of Mobility 4: Move to chair/commode   Modified [place] Scale   Modified [place] Scale 4   End of Consult   Patient Position at End of Consult Supine; All needs within reach       Dean Fleischer, PT, DPT

## 2022-04-30 LAB
ANION GAP SERPL CALCULATED.3IONS-SCNC: 5 MMOL/L (ref 4–13)
ATRIAL RATE: 106 BPM
BASOPHILS # BLD AUTO: 0.02 THOUSANDS/ΜL (ref 0–0.1)
BASOPHILS NFR BLD AUTO: 0 % (ref 0–1)
BUN SERPL-MCNC: 30 MG/DL (ref 5–25)
CALCIUM SERPL-MCNC: 9.4 MG/DL (ref 8.3–10.1)
CHLORIDE SERPL-SCNC: 103 MMOL/L (ref 100–108)
CO2 SERPL-SCNC: 31 MMOL/L (ref 21–32)
CREAT SERPL-MCNC: 1.38 MG/DL (ref 0.6–1.3)
EOSINOPHIL # BLD AUTO: 0.13 THOUSAND/ΜL (ref 0–0.61)
EOSINOPHIL NFR BLD AUTO: 2 % (ref 0–6)
ERYTHROCYTE [DISTWIDTH] IN BLOOD BY AUTOMATED COUNT: 13.5 % (ref 11.6–15.1)
GFR SERPL CREATININE-BSD FRML MDRD: 57 ML/MIN/1.73SQ M
GLUCOSE SERPL-MCNC: 102 MG/DL (ref 65–140)
GLUCOSE SERPL-MCNC: 102 MG/DL (ref 65–140)
GLUCOSE SERPL-MCNC: 113 MG/DL (ref 65–140)
GLUCOSE SERPL-MCNC: 153 MG/DL (ref 65–140)
GLUCOSE SERPL-MCNC: 163 MG/DL (ref 65–140)
GLUCOSE SERPL-MCNC: 88 MG/DL (ref 65–140)
GLUCOSE SERPL-MCNC: 98 MG/DL (ref 65–140)
HCT VFR BLD AUTO: 40 % (ref 36.5–49.3)
HGB BLD-MCNC: 13.7 G/DL (ref 12–17)
IMM GRANULOCYTES # BLD AUTO: 0.02 THOUSAND/UL (ref 0–0.2)
IMM GRANULOCYTES NFR BLD AUTO: 0 % (ref 0–2)
LYMPHOCYTES # BLD AUTO: 1.53 THOUSANDS/ΜL (ref 0.6–4.47)
LYMPHOCYTES NFR BLD AUTO: 21 % (ref 14–44)
MAGNESIUM SERPL-MCNC: 1.8 MG/DL (ref 1.6–2.6)
MCH RBC QN AUTO: 28.4 PG (ref 26.8–34.3)
MCHC RBC AUTO-ENTMCNC: 34.3 G/DL (ref 31.4–37.4)
MCV RBC AUTO: 83 FL (ref 82–98)
MONOCYTES # BLD AUTO: 0.54 THOUSAND/ΜL (ref 0.17–1.22)
MONOCYTES NFR BLD AUTO: 8 % (ref 4–12)
NEUTROPHILS # BLD AUTO: 4.96 THOUSANDS/ΜL (ref 1.85–7.62)
NEUTS SEG NFR BLD AUTO: 69 % (ref 43–75)
NRBC BLD AUTO-RTO: 0 /100 WBCS
PLATELET # BLD AUTO: 185 THOUSANDS/UL (ref 149–390)
PMV BLD AUTO: 11.8 FL (ref 8.9–12.7)
POTASSIUM SERPL-SCNC: 3.4 MMOL/L (ref 3.5–5.3)
PR INTERVAL: 174 MS
QRS AXIS: -4 DEGREES
QRSD INTERVAL: 80 MS
QT INTERVAL: 366 MS
QTC INTERVAL: 486 MS
RBC # BLD AUTO: 4.83 MILLION/UL (ref 3.88–5.62)
SODIUM SERPL-SCNC: 139 MMOL/L (ref 136–145)
T WAVE AXIS: 215 DEGREES
VENTRICULAR RATE: 106 BPM
WBC # BLD AUTO: 7.2 THOUSAND/UL (ref 4.31–10.16)

## 2022-04-30 PROCEDURE — 99222 1ST HOSP IP/OBS MODERATE 55: CPT | Performed by: INTERNAL MEDICINE

## 2022-04-30 PROCEDURE — 83735 ASSAY OF MAGNESIUM: CPT | Performed by: PHYSICIAN ASSISTANT

## 2022-04-30 PROCEDURE — 93005 ELECTROCARDIOGRAM TRACING: CPT

## 2022-04-30 PROCEDURE — 97605 NEG PRS WND THER DME<=50SQCM: CPT | Performed by: PODIATRIST

## 2022-04-30 PROCEDURE — 82948 REAGENT STRIP/BLOOD GLUCOSE: CPT

## 2022-04-30 PROCEDURE — 93010 ELECTROCARDIOGRAM REPORT: CPT | Performed by: INTERNAL MEDICINE

## 2022-04-30 PROCEDURE — 80048 BASIC METABOLIC PNL TOTAL CA: CPT

## 2022-04-30 PROCEDURE — 99232 SBSQ HOSP IP/OBS MODERATE 35: CPT | Performed by: PHYSICIAN ASSISTANT

## 2022-04-30 PROCEDURE — 85025 COMPLETE CBC W/AUTO DIFF WBC: CPT | Performed by: PHYSICIAN ASSISTANT

## 2022-04-30 RX ORDER — RAMIPRIL 5 MG/1
20 CAPSULE ORAL EVERY MORNING
Status: DISCONTINUED | OUTPATIENT
Start: 2022-04-30 | End: 2022-04-30

## 2022-04-30 RX ORDER — POTASSIUM CHLORIDE 20 MEQ/1
40 TABLET, EXTENDED RELEASE ORAL ONCE
Status: COMPLETED | OUTPATIENT
Start: 2022-04-30 | End: 2022-04-30

## 2022-04-30 RX ORDER — RAMIPRIL 10 MG/1
20 CAPSULE ORAL EVERY MORNING
Status: DISCONTINUED | OUTPATIENT
Start: 2022-04-30 | End: 2022-05-02 | Stop reason: HOSPADM

## 2022-04-30 RX ORDER — DABIGATRAN ETEXILATE 150 MG/1
150 CAPSULE, COATED PELLETS ORAL EVERY 12 HOURS SCHEDULED
Status: DISCONTINUED | OUTPATIENT
Start: 2022-04-30 | End: 2022-04-30

## 2022-04-30 RX ORDER — INSULIN GLARGINE 100 [IU]/ML
10 INJECTION, SOLUTION SUBCUTANEOUS ONCE
Status: COMPLETED | OUTPATIENT
Start: 2022-04-30 | End: 2022-04-30

## 2022-04-30 RX ORDER — RAMIPRIL 5 MG/1
10 CAPSULE ORAL 2 TIMES DAILY
Status: DISCONTINUED | OUTPATIENT
Start: 2022-04-30 | End: 2022-04-30

## 2022-04-30 RX ORDER — MAGNESIUM SULFATE 1 G/100ML
1 INJECTION INTRAVENOUS ONCE
Status: COMPLETED | OUTPATIENT
Start: 2022-04-30 | End: 2022-04-30

## 2022-04-30 RX ORDER — METOPROLOL TARTRATE 5 MG/5ML
2.5 INJECTION INTRAVENOUS ONCE
Status: COMPLETED | OUTPATIENT
Start: 2022-04-30 | End: 2022-04-30

## 2022-04-30 RX ORDER — RAMIPRIL 10 MG/1
20 CAPSULE ORAL EVERY MORNING
Status: DISCONTINUED | OUTPATIENT
Start: 2022-04-30 | End: 2022-04-30

## 2022-04-30 RX ORDER — RAMIPRIL 10 MG/1
10 CAPSULE ORAL EVERY EVENING
Status: DISCONTINUED | OUTPATIENT
Start: 2022-04-30 | End: 2022-05-02 | Stop reason: HOSPADM

## 2022-04-30 RX ADMIN — INSULIN GLARGINE 60 UNITS: 100 INJECTION, SOLUTION SUBCUTANEOUS at 07:50

## 2022-04-30 RX ADMIN — VANCOMYCIN HYDROCHLORIDE 1250 MG: 10 INJECTION, POWDER, LYOPHILIZED, FOR SOLUTION INTRAVENOUS at 00:20

## 2022-04-30 RX ADMIN — DILTIAZEM HYDROCHLORIDE 30 MG: 30 TABLET, FILM COATED ORAL at 17:03

## 2022-04-30 RX ADMIN — INSULIN GLARGINE 10 UNITS: 100 INJECTION, SOLUTION SUBCUTANEOUS at 22:37

## 2022-04-30 RX ADMIN — TERAZOSIN HYDROCHLORIDE 4 MG: 1 CAPSULE ORAL at 09:13

## 2022-04-30 RX ADMIN — METOPROLOL TARTRATE 2.5 MG: 1 INJECTION, SOLUTION INTRAVENOUS at 00:02

## 2022-04-30 RX ADMIN — VANCOMYCIN HYDROCHLORIDE 1250 MG: 10 INJECTION, POWDER, LYOPHILIZED, FOR SOLUTION INTRAVENOUS at 22:37

## 2022-04-30 RX ADMIN — RAMIPRIL 10 MG: 10 CAPSULE ORAL at 20:59

## 2022-04-30 RX ADMIN — INSULIN LISPRO 2 UNITS: 100 INJECTION, SOLUTION INTRAVENOUS; SUBCUTANEOUS at 17:03

## 2022-04-30 RX ADMIN — AMLODIPINE BESYLATE 5 MG: 5 TABLET ORAL at 17:03

## 2022-04-30 RX ADMIN — METOPROLOL TARTRATE 2.5 MG: 1 INJECTION, SOLUTION INTRAVENOUS at 00:19

## 2022-04-30 RX ADMIN — DILTIAZEM HYDROCHLORIDE 30 MG: 30 TABLET, FILM COATED ORAL at 23:13

## 2022-04-30 RX ADMIN — VANCOMYCIN HYDROCHLORIDE 1250 MG: 10 INJECTION, POWDER, LYOPHILIZED, FOR SOLUTION INTRAVENOUS at 10:42

## 2022-04-30 RX ADMIN — ACETAMINOPHEN 650 MG: 325 TABLET ORAL at 20:59

## 2022-04-30 RX ADMIN — CARVEDILOL 50 MG: 25 TABLET, FILM COATED ORAL at 07:50

## 2022-04-30 RX ADMIN — MAGNESIUM SULFATE HEPTAHYDRATE 1 G: 1 INJECTION, SOLUTION INTRAVENOUS at 07:50

## 2022-04-30 RX ADMIN — TERAZOSIN HYDROCHLORIDE 4 MG: 1 CAPSULE ORAL at 17:03

## 2022-04-30 RX ADMIN — CARVEDILOL 50 MG: 25 TABLET, FILM COATED ORAL at 17:03

## 2022-04-30 RX ADMIN — ATORVASTATIN CALCIUM 40 MG: 40 TABLET, FILM COATED ORAL at 17:03

## 2022-04-30 RX ADMIN — POTASSIUM CHLORIDE 40 MEQ: 1500 TABLET, EXTENDED RELEASE ORAL at 07:50

## 2022-04-30 RX ADMIN — AMLODIPINE BESYLATE 5 MG: 5 TABLET ORAL at 09:13

## 2022-04-30 RX ADMIN — DABIGATRAN ETEXILATE MESYLATE 150 MG: 150 CAPSULE ORAL at 09:45

## 2022-04-30 RX ADMIN — DILTIAZEM HYDROCHLORIDE 30 MG: 30 TABLET, FILM COATED ORAL at 12:19

## 2022-04-30 RX ADMIN — INSULIN LISPRO 2 UNITS: 100 INJECTION, SOLUTION INTRAVENOUS; SUBCUTANEOUS at 10:42

## 2022-04-30 RX ADMIN — CHLORTHALIDONE 50 MG: 25 TABLET ORAL at 12:19

## 2022-04-30 NOTE — CONSULTS
Consultation - General Cardiology Team 2  Lion Vilchis 46 y o  male MRN: [id number]  Unit/Bed#: [facility] 410-01 Encounter: [id number]      Inpatient consult to Cardiology  Consult performed by: Justyna Santamaria MD  Consult ordered by: Gloria Tan PA-C        PCP: Nakia Arshad MD   Outpatient Cardiologist: Balta Simms MD [facility]    History of Present Illness   Physician Requesting Consult: Fred Roy MD  Reason for Consult / Principal Problem: tachycardia    HPI: Lion Vilchis is a 46y o  year old male with a history of paroxysmal atrial fibrillation, HTN, DM2, ROBERTO on CPAP, HFpEF, and CKD Stage 3  He regularly follows with Dr Suraj Paez, an electrophysiologist with the [facility] and has an extensive history of atrial fibrillation for many years  He was previously tried on Sotalol with limited success and underwent pulmonary vein isolation in 2017  He was maintaining sinus rhythm for many years until he contracted a mild COVID infection in January 2022 with recurrence of atrial fibrillation  He then underwent a cardioversion and was again maintaining normal sinus rhythm  He presented for left foot surgery on 4/8 for a diabetic foot ulcer which became complicated by an infected post-op hematoma  He underwent washout on 4/28  Post-operatively he developed symptoms of lightheadedness and heart racing while walking to the bathroom that night and had IV metoprolol given after which his symptoms improved  Since then, he has not experienced chest pain, shortness of breath, dizziness/lightheadedness, or palpitations  He is consistent with his CPAP machine and takes all his medications including his anticoagulation as prescribed  Review of Systems  Review of system was conducted and was negative except for as stated in the HPI        Historical Information   Past Medical History:   Diagnosis Date    [facility]     BPH (benign prostatic hyperplasia)     CPAP (continuous positive airway pressure) dependence     Diabetes mellitus ([facility] )     GERD (gastroesophageal reflux disease)     History of COVID-19 01/2022    mild s/s but went into a fib    Hyperlipidemia     Hypertension     MRSA infection 2021    back cyst    Sleep apnea     Tailor's bunion of left foot      Past Surgical History:   Procedure Laterality Date    CARDIAC CATHETERIZATION  2010    [facility]    CARDIAC ELECTROPHYSIOLOGY STUDY AND ABLATION      CARDIOVERSION      CATARACT EXTRACTION Bilateral 07/2021    COLONOSCOPY      INCISION AND DRAINAGE OF WOUND Left 4/28/2022    Procedure: foot I&D with vac application; 5TH metatarsal bone biopsy;  Surgeon: Travon Gonzalez DPM;  [facility]: [facility];  Service: Podiatry    NOSE SURGERY      TOE OSTEOTOMY Left 4/8/2022    Procedure: RESECTION LEFT 5TH METATARSAL;  Surgeon: Travon Gonzalez DPM;  Location:  MAIN OR;  Service: Podiatry     Social History     Substance and Sexual Activity   Alcohol Use Yes    Comment: Rare     Social History     Substance and Sexual Activity   Drug Use Never     Social History     Tobacco Use   Smoking Status Never Smoker   Smokeless Tobacco Former User    Types: Chew     Family History: non-contributory    Meds/Allergies   Hospital Medications:   Current Facility-Administered Medications   Medication Dose Route Frequency    acetaminophen (TYLENOL) tablet 650 mg  650 mg Oral Q4H PRN    amLODIPine (NORVASC) tablet 5 mg  5 mg Oral BID    atorvastatin (LIPITOR) tablet 40 mg  40 mg Oral Daily With Dinner    carvedilol (COREG) tablet 50 mg  50 mg Oral BID With Meals    chlorthalidone tablet 50 mg  50 mg Oral Daily    fluticasone (FLONASE) 50 mcg/act nasal spray 2 spray  2 spray Each Nare PRN    HYDROmorphone (DILAUDID) injection 0 5 mg  0 5 mg Intravenous Q1H PRN    insulin glargine (LANTUS) subcutaneous injection 60 Units 0 6 mL  60 Units Subcutaneous Daily With Breakfast    insulin glargine (LANTUS) subcutaneous injection 90 Units 0 9 mL  90 Units Subcutaneous HS    insulin lispro (HumaLOG) 100 units/mL subcutaneous injection 2-12 Units  2-12 Units Subcutaneous 4x Daily (AC & HS)    metoprolol (LOPRESSOR) injection 2 5 mg  2 5 mg Intravenous Q6H PRN    naloxone (NARCAN) 0 04 mg/mL syringe 0 04 mg  0 04 mg Intravenous Q1MIN PRN    NON FORMULARY  10 mg Oral BID    ondansetron (ZOFRAN) injection 4 mg  4 mg Intravenous Q6H PRN    oxyCODONE (ROXICODONE) immediate release tablet 10 mg  10 mg Oral Q4H PRN    oxyCODONE (ROXICODONE) IR tablet 5 mg  5 mg Oral Q4H PRN    polyethylene glycol (MIRALAX) packet 17 g  17 g Oral Daily PRN    terazosin (HYTRIN) capsule 4 mg  4 mg Oral BID    vancomycin (VANCOCIN) 1,250 mg in sodium chloride 0 9 % 250 mL IVPB  12 5 mg/kg (Adjusted) Intravenous Q12H     Home Medications:   Medications Prior to Admission   Medication    amLODIPine (NORVASC) 5 mg tablet    atorvastatin (LIPITOR) 40 mg tablet    carvedilol (COREG) 25 mg tablet    chlorthalidone 25 mg tablet    Lantus SoloStar 100 units/mL injection pen    metFORMIN (GLUCOPHAGE) 1000 MG tablet    Pradaxa 150 MG capsu    ramipril (ALTACE) 10 MG capsule    terazosin (HYTRIN) 2 mg capsule    B-D ULTRAFINE III SHORT PEN 31G X 8 MM MISC    Continuous Blood Gluc Sensor (FreeStyle Miah 2 Sensor) MISC    doxycycline (ADOXA) 100 MG tablet    fluticasone (FLONASE) 50 mcg/act nasal spray    furosemide (LASIX) 40 mg tablet    Lyumjev KwikPen 100 UNIT/ML SOPN    potassium chloride (K-DUR,KLOR-CON) 20 mEq tablet       No Known Allergies    Objective   Vitals: Blood pressure 141/90, pulse (!) 107, temperature 98 3 °F (36 8 °C), temperature source Oral, resp  rate 16, height 6' (1 829 m), weight 120 kg (264 lb 8 8 oz), SpO2 97 %    Orthostatic Blood Pressures      Most Recent Value   Blood Pressure 141/90 filed at 04/30/2022 1103   Patient Position - Orthostatic VS Lying filed at 04/30/2022 1103            Invasive Devices  Report    Peripheral Intravenous Line Peripheral IV 04/27/22 Dorsal (posterior); Left Forearm 2 days                Physical Exam  GEN: Chely Celeste appears well, alert and oriented x 3, pleasant and cooperative   HEENT:  Normocephalic, atraumatic, anicteric, moist mucous membranes  NECK: No JVD or carotid bruits   HEART: regular rhythm, normal rate, normal S1 and S2, no murmurs, clicks, gallops or rubs   LUNGS: Clear to auscultation bilaterally; no wheezes, rales, or rhonchi; respiration nonlabored   ABDOMEN:  Normoactive bowel sounds, soft, no tenderness, no distention  EXTREMITIES: peripheral pulses palpable; no edema    Lab Results: I have personally reviewed pertinent lab results  Results from last 7 days   Lab Units 04/30/22  0436 04/29/22  0523 04/28/22  0734   POTASSIUM mmol/L 3 4* 4 3 3 6   CO2 mmol/L 31 31 31   CHLORIDE mmol/L 103 102 105   BUN mg/dL 30* 26* 38*   CREATININE mg/dL 1 38* 1 29 1 28     Results from last 7 days   Lab Units 04/30/22  0435 04/29/22  0523 04/27/22  2232   HEMOGLOBIN g/dL 13 7 13 8 12 5   HEMATOCRIT % 40 0 40 0 37 6   PLATELETS Thousands/uL 185 179 201             Imaging: I have personally reviewed pertinent reports  Telemetry:   Atrial flutter with alternating 2:1, 3:1, and 4:1 conduction    Assessment/Plan     Paroxysmal Atrial fibrillation / flutter     Stable chronic HFpEF    HTN    DM2    Hyperlipidemia     ROBERTO on CPAP    CKD Stage 3     Infected left foot ulcer    51M with extensive history of Atrial fibrillation including ablation in 2017, failed sotalol therapy, and multiple prior cardioversions, follows regularly with Dr See Potts [facility]  He has risk factors for atrial arrhythmia including ROBERTO, HTN, DM2, but his episodes seem to be triggered by acute stress such as recent COVID infection and surgery  He had been maintaining sinus rhythm until after his most recent washout surgery for an infected foot hematoma on 4/28   Telemetry is showing mostly typical atrial flutter with alternating conduction  He remains relatively asymptomatic with this with rates mostly < 100 bpm, although he continues to have rates in the 120s  He had an echocardiogram in 2019 which showed normal LV function, mild MR, and moderately dilated left atrium  Will pursue rate control optimization for now with addition of Diltiazem IR PO 30mg Q6H, in addition to his carvedilol  He will discuss the option of atrial flutter ablation with Dr Piero Denson as an outpatient if he continues to remain in flutter  Continue Dabigatran for anticoagulation  His [id number] score is 3, indicating that he would not require bridging anticoagulation for short interruptions of oral anticoagulation  He is stable from a heart failure standpoint and takes diuretics only on an as-needed basis  Please supplement electrolytes to maintain K > 4 0 and Mg > 2 0       Humble Moreno MD  Cardiology Fellow

## 2022-04-30 NOTE — ASSESSMENT & PLAN NOTE
Lab Results   Component Value Date    HGBA1C 13 3 (H) 04/06/2021       Recent Labs     04/29/22  1631 04/29/22  2144 04/30/22  0627 04/30/22  1040   POCGLU 164* 192* 88 163*       Blood Sugar Average: Last 72 hrs:  (P) 623 [id number]   · Home insulin 65 AM 90 PM lantus with sliding scale   · Currently on 60 units AM and changed to 90 units pm per patient request    · Continue SSI   · Adjust as needed

## 2022-04-30 NOTE — ASSESSMENT & PLAN NOTE
· Left foot infected post op hematoma  · S/p left 5th metatarsal resection (DOS: 4/8/22)  · S/p washout 4/29 plan for repeat washout tomorrow 5/1  · On IV Vancomycin given operative cultures positive for MRSA   · Day 4   · Plan for po bactrim on discharge for total 7 days of treatment

## 2022-04-30 NOTE — ASSESSMENT & PLAN NOTE
· Noted histroy s/p ablation in 2017 with return of a fib and multiple cardioversions in the past   · Follows with Dr Yfn Pack of EP at [facility]  Recently he has been in NSR since cardioversion after suffering covid in early January of this year  · Noted to have intermittent RVR yesterday which broke and currently having fast heart rates again   · Currently on telemetry  · On pradaxan restarted yesterday 4/29  · Will be held tonight for procedure tomorrow  · Coreg 50 mg BID for rate control   · Prn lopressor for sustain HR > 110   · Will consult cardiology for med adjustment for rapid a fib

## 2022-04-30 NOTE — CASE MANAGEMENT
Case Management Assessment & Discharge Planning Note    Patient name Jonathan Chen  Location [facility] 227-18 MRN [id number]  : 1970 Date 2022       Current Admission Date: 2022  Current Admission Diagnosis:Pre-op evaluation   Patient Active Problem List    Diagnosis Date Noted    Infected hematoma 2022    Pre-op evaluation 2022    Dysrhythmias 02/15/2021    HTN (hypertension) 02/15/2021    Atrial fibrillation with RVR ([facility] ) 02/15/2021    Diabetes mellitus, type 2 ([facility] ) 02/15/2021    Sleep apnea 02/15/2021    Esophageal thickening 2020    Screen for colon cancer 2020      LOS (days): 3  Geometric Mean LOS (GMLOS) (days): 4 10  Days to GMLOS:1 2     OBJECTIVE:    Risk of Unplanned Readmission Score: 11         Current admission status: Inpatient       Preferred Pharmacy:   [address]  Phone: [phone] Fax: [phone]    Primary Care Provider: Fidel Ch MD    Primary Insurance: BLUE CROSS  Secondary Insurance:     ASSESSMENT:  [place] [id number], [facility] & [address] Representative - Spouse   Primary Phone: [phone] (Home)               Advance Directives  Does patient have a [address]?: No  Was patient offered paperwork?: Yes (offered and declined)  Does patient have Advance Directives?: No  Was patient offered paperwork?: Yes (offered and declined)  Primary Contact: wife Andie Larkin         Readmission Root Cause  30 Day Readmission: No    Patient Information  Admitted from[de-identified] Home  Mental Status: Alert  During Assessment patient was accompanied by: Spouse  Assessment information provided by[de-identified] Patient  Primary Caregiver: Self  Support Systems: Self,Spouse/significant other  [place] of Residence: [address] [place] [address] Street entry access options   Select all that apply : Stairs  Number of steps to enter home : 2  Do the steps have railings?: Yes  Type of Current Residence: 2 story home  Upon entering residence, is there a bedroom on the main floor (no further steps)?: No  A bedroom is located on the following floor levels of residence (select all that apply):: 2nd Floor  Upon entering residence, is there a bathroom on the main floor (no further steps)?: Yes  Number of steps to 2nd floor from main floor: One Flight  In the last 12 months, how many places have you lived?: 1  In the last 12 months, was there a time when you did not have a steady place to sleep or slept in a shelter (including now)?: No  Homeless/housing insecurity resource given?: N/A  Living Arrangements: Lives w/ Spouse/significant other  Is patient a ?: No    Activities of Daily Living Prior to Admission  Functional Status: Independent  Completes ADLs independently?: Yes  Ambulates independently?: Yes  Does patient use assisted devices?: Yes  Assisted Devices (DME) used:  Other (Comment) (rolling knee walker)  Does patient currently own DME?: Yes  What DME does the patient currently own?: Other (Comment) (knee walker /roller)  Does patient have a history of Outpatient Therapy (PT/OT)?: No  Does the patient have a history of Short-Term Rehab?: No  Does patient have a history of HHC?: Yes ([facility])  Does patient currently have [facility]?: Yes ([facility])    Current Home Health Care  Type of Current Home Care Services: Nurse visit  Current Home Health Agency[de-identified] [facility] Provider[de-identified] PCP    Patient Information Continued  Income Source: Employed  Does patient have prescription coverage?: Yes  Within the past 12 months, you worried that your food would run out before you got the money to buy more : Never true  Within the past 12 months, the food you bought just didnt last and you didnt have money to get more : Never true  Food insecurity resource given?: N/A  Does patient receive dialysis treatments?: No  Does patient have a history of substance abuse?: No  Does patient have a history of Mental Health Diagnosis?: No    PHQ 2/9 Screening   Reviewed PHQ 2/9 Depression Screening Score?: No    Means of Transportation  Means of Transport to Appts[de-identified] Drives Self  In the past 12 months, has lack of transportation kept you from medical appointments or from getting medications?: No  In the past 12 months, has lack of transportation kept you from meetings, work, or from getting things needed for daily living?: No  Was application for public transport provided?: N/A        DISCHARGE DETAILS:    Discharge planning discussed with[de-identified] Patient  Freedom of Choice: No     CM contacted family/caregiver?: No- see comments (pend recs/ wife at bedside)  Were Treatment Team discharge recommendations reviewed with patient/caregiver?: No (pend recs)  Did patient/caregiver verbalize understanding of patient care needs?: Yes  Were patient/caregiver advised of the risks associated with not following Treatment Team discharge recommendations?: Yes    Contacts  Patient Contacts: Wife Tremayne Drop  Relationship to Patient[de-identified] Family  Contact Method:  In Person  Reason/Outcome: Continuity of [address]         Is the patient interested in [facility] at discharge?: Yes  Via Ledy Chaudhari requested[de-identified] [address] Name[de-identified] [facility] Provider[de-identified] PCP  Home Health Services Needed[de-identified] Post-Op Care and Assessment,Wound/Ostomy Care  Homebound Criteria Met[de-identified] Requires the Assistance of Another Person for Safe Ambulation or to Leave the Home,Uses an Assist Device (i e  cane, walker, etc)  Supporting Clincal Findings[de-identified] Limited Endurance    DME Referral Provided  Referral made for DME?: No    Other Referral/Resources/Interventions Provided:  Interventions: C  Referral Comments: pt states he is current with [facility] and would like to resume services upon dc from the hospital     Would you like to participate in our [address] service program?  : No - Declined    Treatment Team Recommendation: Home with [facility])  Discharge Destination Plan[de-identified] Home with [facility])  Transport at Discharge : Family (Wife Rob Lindquist)

## 2022-04-30 NOTE — PROGRESS NOTES
Vancomycin IV Pharmacy-to-Dose Consultation    Misa Bajwa is a 46 y o  male who is currently receiving Vancomycin IV with management by the Pharmacy Consult service  Assessment/Plan:  The patient was reviewed  Renal function is stable and no signs or symptoms of nephrotoxicity and/or infusion reactions were documented in the chart  Based on todays assessment, continue current vancomycin (day # 4) dosing of 1250 mg q12h, with a plan for trough to be drawn at 1030 on 5/1  We will continue to follow the patients culture results and clinical progress daily      Gale Powers, SeymourD  Emergency Medicine Clinical Pharmacist    or Yanique

## 2022-04-30 NOTE — PROGRESS NOTES
[facility] [facility]  Progress Note - Arcelia Wilson 1970, 46 y o  male MRN: [id number]  Unit/Bed#: [facility] 410-01 Encounter: [id number]  Primary Care Provider: Rema Whitfield MD   Date and time admitted to hospital: 4/27/2022  7:14 PM    * Pre-op evaluation  Assessment & Plan  · Pre op eval patient appropriate for surgery per chart review   · Plan for repeat washout 4/30  · Internal medicine now primary given infection, and rapid a fib  Infected hematoma  Assessment & Plan  · Left foot infected post op hematoma  · S/p left 5th metatarsal resection (DOS: 4/8/22)  · S/p washout 4/29 plan for repeat washout tomorrow 5/1  · On IV Vancomycin given operative cultures positive for MRSA   · Day 4   · Plan for po bactrim on discharge for total 7 days of treatment     Atrial fibrillation with RVR ([address] )  Assessment & Plan  · Noted histroy s/p ablation in 2017 with return of a fib and multiple cardioversions in the past   · Follows with Dr Noemi Echavarria of EP at [facility]  Recently he has been in NSR since cardioversion after suffering covid in early January of this year  · Noted to have intermittent RVR yesterday which broke and currently having fast heart rates again   · Currently on telemetry  · On pradaxan restarted yesterday 4/29  · Will be held tonight for procedure tomorrow  · Coreg 50 mg BID for rate control   · Prn lopressor for sustain HR > 110   · Will consult cardiology for med adjustment for rapid a fib       Diabetes mellitus, type 2 [facility])  Assessment & Plan  Lab Results   Component Value Date    HGBA1C 13 3 (H) 04/06/2021       Recent Labs     04/29/22  1631 04/29/22  2144 04/30/22  0627 04/30/22  1040   POCGLU 164* 192* 88 163*       Blood Sugar Average: Last 72 hrs:  (P) 202 [id number]   · Home insulin 65 AM 90 PM lantus with sliding scale   · Currently on 60 units AM and changed to 90 units pm per patient request    · Continue SSI   · Adjust as needed     HTN (hypertension)  Assessment & Plan  · Reviewed and stable   · Continue home bp meds       VTE Pharmacologic Prophylaxis: VTE Score: 2 Low Risk (Score 0-2) - Encourage Ambulation  Patient Centered Rounds: I performed bedside rounds with nursing staff today  Discussions with Specialists or Other Care Team Provider: cardiology, podiatry, CM     Education and Discussions with Family / Patient: Updated  (wife) at bedside  Time Spent for Care: 45 minutes  More than 50% of total time spent on counseling and coordination of care as described above  Current Length of Stay: 3 day(s)  Current Patient Status: Inpatient   Certification Statement: The patient will continue to require additional inpatient hospital stay due to pending washout tomorrow with podiatry  Discharge Plan: Anticipate discharge in 24-48 hrs to home  Code Status: Level 1 - Full Code    Subjective:   Patient noted to have episodes of a fib with rvr which were more sustained last night prompting telemtry and floor change  He did not have symptoms during these episodes  Dicussed with patient at length regarding his cardiac history,  He has been in NSR since cardioversion in January  He follows with EP at [facility]  Objective:     Vitals:   Temp (24hrs), Av 9 °F (36 6 °C), Min:97 2 °F (36 2 °C), Max:98 3 °F (36 8 °C)    Temp:  [97 2 °F (36 2 °C)-98 3 °F (36 8 °C)] 98 3 °F (36 8 °C)  HR:  [] 107  Resp:  [16-18] 16  BP: (121-156)/(83-98) 141/90  SpO2:  [96 %-97 %] 97 %  Body mass index is 35 88 kg/m²  Input and Output Summary (last 24 hours): Intake/Output Summary (Last 24 hours) at 2022 1105  Last data filed at 2022 0709  Gross per 24 hour   Intake 250 ml   Output 1250 ml   Net -1000 ml       Physical Exam:   Physical Exam  Constitutional:       General: He is not in acute distress  Appearance: He is obese  HENT:      Head: Normocephalic and atraumatic        Mouth/Throat:      Mouth: Mucous membranes are moist       Pharynx: Oropharynx is clear  Eyes:      Conjunctiva/sclera: Conjunctivae normal       Pupils: Pupils are equal, round, and reactive to light  Cardiovascular:      Rate and Rhythm: Tachycardia present  Rhythm irregular  Pulses: Normal pulses  Heart sounds: Normal heart sounds  No murmur heard  Pulmonary:      Effort: Pulmonary effort is normal  No respiratory distress  Breath sounds: Normal breath sounds  No wheezing or rales  Abdominal:      General: Abdomen is flat  There is no distension  Palpations: Abdomen is soft  Tenderness: There is no abdominal tenderness  Musculoskeletal:         General: Tenderness present  Cervical back: No muscular tenderness  Right lower leg: No edema  Left lower leg: No edema  Comments: Vac on left foot  Skin:     General: Skin is warm and dry  Capillary Refill: Capillary refill takes less than 2 seconds  Coloration: Skin is not jaundiced  Neurological:      General: No focal deficit present  Mental Status: He is alert and oriented to person, place, and time  Cranial Nerves: No cranial nerve deficit     Psychiatric:         Mood and Affect: Mood normal           Additional Data:     Labs:  Results from last 7 days   Lab Units 04/30/22  0435   WBC Thousand/uL 7 20   HEMOGLOBIN g/dL 13 7   HEMATOCRIT % 40 0   PLATELETS Thousands/uL 185   NEUTROS PCT % 69   LYMPHS PCT % 21   MONOS PCT % 8   EOS PCT % 2     Results from last 7 days   Lab Units 04/30/22  0436 04/28/22  0734 04/27/22  2232   SODIUM mmol/L 139   < > 141   POTASSIUM mmol/L 3 4*   < > 3 6   CHLORIDE mmol/L 103   < > 105   CO2 mmol/L 31   < > 32   BUN mg/dL 30*   < > 50*   CREATININE mg/dL 1 38*   < > 1 56*   ANION GAP mmol/L 5   < > 4   CALCIUM mg/dL 9 4   < > 9 3   ALBUMIN g/dL  --   --  3 6   TOTAL BILIRUBIN mg/dL  --   --  0 64   ALK PHOS U/L  --   --  108   ALT U/L  --   --  33   AST U/L  --   --  26   GLUCOSE RANDOM mg/dL 113   < > 109    < > = values in this interval not displayed  Results from last 7 days   Lab Units 04/30/22  1040 04/30/22  0627 04/29/22  2144 04/29/22  1631 04/29/22  1109 04/29/22  0716 04/28/22  2120 04/28/22  1635 04/28/22  1351 04/28/22  1146 04/28/22  0740 04/28/22  0616   POC GLUCOSE mg/dl 163* 88 192* 164* 213* 167* 136 206* 98 102 75 66               Lines/Drains:  Invasive Devices  Report    Peripheral Intravenous Line            Peripheral IV 04/27/22 Dorsal (posterior); Left Forearm 2 days                  Telemetry:  Telemetry Orders (From admission, onward)             Telemetry monitoring  Continuous x 48 hours        References:    Telemetry Guidelines   Question:  Reason for 48 Hour Telemetry  Answer:  Arrhythmias Requiring Medical Therapy (eg  SVT, Vtach/fib, Bradycardia, Uncontrolled A-fib)                 Telemetry Reviewed: Atrial fibrillation  HR averaging 140  Indication for Continued Telemetry Use: Arrthymias requiring medical therapy             Imaging: No pertinent imaging reviewed      Recent Cultures (last 7 days):         Last 24 Hours Medication List:   Current Facility-Administered Medications   Medication Dose Route Frequency Provider Last Rate    acetaminophen  650 mg Oral Q4H PRN Lepe Zack, DPM      amLODIPine  5 mg Oral BID Lepe Zack, DPM      atorvastatin  40 mg Oral Daily With [address] [place], DPM      carvedilol  50 mg Oral BID With Meals Lepe Zack, DPM      chlorthalidone  50 mg Oral Daily Lepe Zack, DPM      fluticasone  2 spray Each Nare PRN Lepe Zack, DPM      HYDROmorphone  0 5 mg Intravenous Q1H PRN Lepe Zack, DPM      insulin glargine  60 Units Subcutaneous Daily With Breakfast  Zack, DPM      insulin glargine  90 Units Subcutaneous HS Lepe Zack, DPM      insulin lispro  2-12 Units Subcutaneous 4x Daily (AC & HS) Lotus Huerta PA-C      metoprolol  2 5 mg Intravenous Q6H PRN Lotus Huerta PA-C      naloxone  0 04 mg Intravenous Q1MIN PRN Willia Cr, DPM      NON FORMULARY  10 mg Oral BID Dipika Rae PA-C      ondansetron  4 mg Intravenous Q6H PRN Willia Cr, DPM      oxyCODONE  10 mg Oral Q4H PRN Willia Cr, DPM      oxyCODONE  5 mg Oral Q4H PRN Willia Cr, DPM      polyethylene glycol  17 g Oral Daily PRN Willia Cr, DPM      terazosin  4 mg Oral BID Willia Cr, DPM      vancomycin  12 5 mg/kg (Adjusted) Intravenous Q12H Willia Cr, DPM 1,250 mg (04/30/22 1042)        Today, Patient Was Seen By: Dipika Rae PA-C    **Please Note: This note may have been constructed using a voice recognition system  **

## 2022-04-30 NOTE — PROGRESS NOTES
[facility] Podiatry - Progress Note  Patient: Stephany Tapia 46 y o  male   MRN: [id number]  PCP: Kishor Walker MD  Unit/Bed#: [facility] 410-01 Encounter: [id number]  Date Of Visit: 22    ASSESSMENT:    Stephany Tapia is a 46 y o  male with:    1  Left foot infected post op hematoma        -S/p left foot washout with resection of 5th metatarsal (DOS 22)  2  S/p left 5th metatarsal resection (DOS: 22)  3  T2DM  4  A-fib  5  HTN        PLAN:    · Plan for OR for Left foot delayed primary closure with Dr Yosef Andujar, tomorrow   NPO at midnight, consent was signed  The patient was agreeable to the procedure  Benefits, risks, complications and concerns of procedure discussed with the patients understanding  · Continue local wound care, wound vac changed today, wound stable without SOI  · Pain is well controlled, continue current pain management regimen  · Elevation on green foam wedges or pillows when non-ambulatory  · Rest of care per primary team     Weight bearing status: NWB to LLE    Wound VAC application  1  Number of sponges: 2  2  Pressure settin continuous  3  Size of wound: 4 x 1 x 2cm  4  Description of wound: granular base  5  Cannister fluid collection:minimal      SUBJECTIVE:     The patient was seen, evaluated, and assessed at bedside today  The patient was awake, alert, and in no acute distress  No acute events overnight  The patient reports mild pain to surgical foot  Patient denies N/V/F/chills/SOB/CP  OBJECTIVE:     Vitals:   /88 (BP Location: Left arm)   Pulse (!) 124   Temp 97 8 °F (36 6 °C) (Oral)   Resp 17   Ht 6' (1 829 m)   Wt 120 kg (264 lb 8 8 oz)   SpO2 96%   BMI 35 88 kg/m²     Temp (24hrs), Av 8 °F (36 6 °C), Min:97 2 °F (36 2 °C), Max:98 2 °F (36 8 °C)      Physical Exam:     General:  Alert, cooperative, and in no distress  Lower extremity exam:  Cardiovascular status at baseline  Neurological status at baseline    Musculoskeletal status at baseline  No calf tenderness noted  Lower extremity wound(s) as noted below:  Wound #: 1  Location: left lateral foot  Length 4cm: Width 1cm: Depth 2cm:   Deepest Tissue Noted in Base: bone  Peripheral Skin Description: Attached  Granulation: 100% Fibrotic Tissue: 0% Necrotic Tissue: 0%   Drainage Amount: minimal, serosanguinous  Signs of Infection: No    Clinical Images 04/30/22: Additional Data:     Labs:    Results from last 7 days   Lab Units 04/30/22  0435   WBC Thousand/uL 7 20   HEMOGLOBIN g/dL 13 7   HEMATOCRIT % 40 0   PLATELETS Thousands/uL 185   NEUTROS PCT % 69   LYMPHS PCT % 21   MONOS PCT % 8   EOS PCT % 2     Results from last 7 days   Lab Units 04/30/22  0436 04/28/22  0734 04/27/22  2232   POTASSIUM mmol/L 3 4*   < > 3 6   CHLORIDE mmol/L 103   < > 105   CO2 mmol/L 31   < > 32   BUN mg/dL 30*   < > 50*   CREATININE mg/dL 1 38*   < > 1 56*   CALCIUM mg/dL 9 4   < > 9 3   ALK PHOS U/L  --   --  108   ALT U/L  --   --  33   AST U/L  --   --  26    < > = values in this interval not displayed  * I Have Reviewed All Lab Data Listed Above  Recent Cultures (last 7 days):               Imaging: I have personally reviewed pertinent films in PACS  EKG, Pathology, and Other Studies: I have personally reviewed pertinent reports  ** Please Note: Portions of the record may have been created with voice recognition software  Occasional wrong word or "sound a like" substitutions may have occurred due to the inherent limitations of voice recognition software  Read the chart carefully and recognize, using context, where substitutions have occurred   **

## 2022-04-30 NOTE — ASSESSMENT & PLAN NOTE
· Pre op eval patient appropriate for surgery per chart review   · Plan for repeat washout 4/30  · Internal medicine now primary given infection, and rapid a fib

## 2022-05-01 ENCOUNTER — ANESTHESIA EVENT (INPATIENT)
Dept: PERIOP | Facility: HOSPITAL | Age: 52
DRG: 857 | End: 2022-05-01
Payer: COMMERCIAL

## 2022-05-01 ENCOUNTER — ANESTHESIA (INPATIENT)
Dept: PERIOP | Facility: HOSPITAL | Age: 52
DRG: 857 | End: 2022-05-01
Payer: COMMERCIAL

## 2022-05-01 PROBLEM — N18.30 STAGE 3 CHRONIC KIDNEY DISEASE (HCC): Status: ACTIVE | Noted: 2022-05-01

## 2022-05-01 LAB
ANION GAP SERPL CALCULATED.3IONS-SCNC: 6 MMOL/L (ref 4–13)
ATRIAL RATE: 340 BPM
ATRIAL RATE: 77 BPM
BUN SERPL-MCNC: 32 MG/DL (ref 5–25)
CALCIUM SERPL-MCNC: 8.8 MG/DL (ref 8.3–10.1)
CHLORIDE SERPL-SCNC: 105 MMOL/L (ref 100–108)
CO2 SERPL-SCNC: 30 MMOL/L (ref 21–32)
CREAT SERPL-MCNC: 1.56 MG/DL (ref 0.6–1.3)
ERYTHROCYTE [DISTWIDTH] IN BLOOD BY AUTOMATED COUNT: 13.6 % (ref 11.6–15.1)
GFR SERPL CREATININE-BSD FRML MDRD: 49 ML/MIN/1.73SQ M
GLUCOSE SERPL-MCNC: 128 MG/DL (ref 65–140)
GLUCOSE SERPL-MCNC: 138 MG/DL (ref 65–140)
GLUCOSE SERPL-MCNC: 158 MG/DL (ref 65–140)
GLUCOSE SERPL-MCNC: 71 MG/DL (ref 65–140)
GLUCOSE SERPL-MCNC: 80 MG/DL (ref 65–140)
GLUCOSE SERPL-MCNC: 85 MG/DL (ref 65–140)
HCT VFR BLD AUTO: 38 % (ref 36.5–49.3)
HGB BLD-MCNC: 13 G/DL (ref 12–17)
MCH RBC QN AUTO: 28.8 PG (ref 26.8–34.3)
MCHC RBC AUTO-ENTMCNC: 34.2 G/DL (ref 31.4–37.4)
MCV RBC AUTO: 84 FL (ref 82–98)
P AXIS: 65 DEGREES
PLATELET # BLD AUTO: 209 THOUSANDS/UL (ref 149–390)
PMV BLD AUTO: 11.5 FL (ref 8.9–12.7)
POTASSIUM SERPL-SCNC: 3.3 MMOL/L (ref 3.5–5.3)
PR INTERVAL: 304 MS
QRS AXIS: -1 DEGREES
QRS AXIS: -2 DEGREES
QRSD INTERVAL: 76 MS
QRSD INTERVAL: 84 MS
QT INTERVAL: 388 MS
QT INTERVAL: 404 MS
QTC INTERVAL: 439 MS
QTC INTERVAL: 457 MS
RBC # BLD AUTO: 4.52 MILLION/UL (ref 3.88–5.62)
SODIUM SERPL-SCNC: 141 MMOL/L (ref 136–145)
T WAVE AXIS: 248 DEGREES
T WAVE AXIS: 249 DEGREES
VANCOMYCIN TROUGH SERPL-MCNC: 14.6 UG/ML (ref 10–20)
VENTRICULAR RATE: 77 BPM
VENTRICULAR RATE: 77 BPM
WBC # BLD AUTO: 6.33 THOUSAND/UL (ref 4.31–10.16)

## 2022-05-01 PROCEDURE — 0QBP0ZZ EXCISION OF LEFT METATARSAL, OPEN APPROACH: ICD-10-PCS | Performed by: PODIATRIST

## 2022-05-01 PROCEDURE — 20700 MNL PREP&INSJ DP RX DLVR DEV: CPT | Performed by: PODIATRIST

## 2022-05-01 PROCEDURE — 87070 CULTURE OTHR SPECIMN AEROBIC: CPT | Performed by: PODIATRIST

## 2022-05-01 PROCEDURE — 87075 CULTR BACTERIA EXCEPT BLOOD: CPT | Performed by: PODIATRIST

## 2022-05-01 PROCEDURE — 93010 ELECTROCARDIOGRAM REPORT: CPT | Performed by: INTERNAL MEDICINE

## 2022-05-01 PROCEDURE — 3E0V329 INTRODUCTION OF OTHER ANTI-INFECTIVE INTO BONES, PERCUTANEOUS APPROACH: ICD-10-PCS | Performed by: PODIATRIST

## 2022-05-01 PROCEDURE — 99232 SBSQ HOSP IP/OBS MODERATE 35: CPT | Performed by: INTERNAL MEDICINE

## 2022-05-01 PROCEDURE — 82948 REAGENT STRIP/BLOOD GLUCOSE: CPT

## 2022-05-01 PROCEDURE — NC001 PR NO CHARGE: Performed by: PODIATRIST

## 2022-05-01 PROCEDURE — 85027 COMPLETE CBC AUTOMATED: CPT | Performed by: PHYSICIAN ASSISTANT

## 2022-05-01 PROCEDURE — 80048 BASIC METABOLIC PNL TOTAL CA: CPT | Performed by: PHYSICIAN ASSISTANT

## 2022-05-01 PROCEDURE — 87205 SMEAR GRAM STAIN: CPT | Performed by: PODIATRIST

## 2022-05-01 PROCEDURE — 80202 ASSAY OF VANCOMYCIN: CPT

## 2022-05-01 PROCEDURE — 11044 DBRDMT BONE 1ST 20 SQ CM/<: CPT | Performed by: PODIATRIST

## 2022-05-01 PROCEDURE — C1713 ANCHOR/SCREW BN/BN,TIS/BN: HCPCS | Performed by: PODIATRIST

## 2022-05-01 PROCEDURE — 99232 SBSQ HOSP IP/OBS MODERATE 35: CPT | Performed by: PHYSICIAN ASSISTANT

## 2022-05-01 PROCEDURE — 93005 ELECTROCARDIOGRAM TRACING: CPT

## 2022-05-01 PROCEDURE — 87186 SC STD MICRODIL/AGAR DIL: CPT | Performed by: PODIATRIST

## 2022-05-01 DEVICE — RESORBABLE MINI BEAD KIT
Type: IMPLANTABLE DEVICE | Status: FUNCTIONAL
Brand: OSTEOSET

## 2022-05-01 RX ORDER — FENTANYL CITRATE 50 UG/ML
INJECTION, SOLUTION INTRAMUSCULAR; INTRAVENOUS AS NEEDED
Status: DISCONTINUED | OUTPATIENT
Start: 2022-05-01 | End: 2022-05-01

## 2022-05-01 RX ORDER — FENTANYL CITRATE/PF 50 MCG/ML
25 SYRINGE (ML) INJECTION
Status: DISCONTINUED | OUTPATIENT
Start: 2022-05-01 | End: 2022-05-01 | Stop reason: HOSPADM

## 2022-05-01 RX ORDER — DILTIAZEM HYDROCHLORIDE 120 MG/1
120 CAPSULE, COATED, EXTENDED RELEASE ORAL DAILY
Status: DISCONTINUED | OUTPATIENT
Start: 2022-05-02 | End: 2022-05-02 | Stop reason: HOSPADM

## 2022-05-01 RX ORDER — POTASSIUM CHLORIDE 20 MEQ/1
40 TABLET, EXTENDED RELEASE ORAL ONCE
Status: COMPLETED | OUTPATIENT
Start: 2022-05-01 | End: 2022-05-01

## 2022-05-01 RX ORDER — PROPOFOL 10 MG/ML
INJECTION, EMULSION INTRAVENOUS AS NEEDED
Status: DISCONTINUED | OUTPATIENT
Start: 2022-05-01 | End: 2022-05-01

## 2022-05-01 RX ORDER — MIDAZOLAM HYDROCHLORIDE 2 MG/2ML
INJECTION, SOLUTION INTRAMUSCULAR; INTRAVENOUS AS NEEDED
Status: DISCONTINUED | OUTPATIENT
Start: 2022-05-01 | End: 2022-05-01

## 2022-05-01 RX ORDER — EPHEDRINE SULFATE 50 MG/ML
INJECTION INTRAVENOUS AS NEEDED
Status: DISCONTINUED | OUTPATIENT
Start: 2022-05-01 | End: 2022-05-01

## 2022-05-01 RX ORDER — SODIUM CHLORIDE, SODIUM LACTATE, POTASSIUM CHLORIDE, CALCIUM CHLORIDE 600; 310; 30; 20 MG/100ML; MG/100ML; MG/100ML; MG/100ML
50 INJECTION, SOLUTION INTRAVENOUS CONTINUOUS
Status: DISCONTINUED | OUTPATIENT
Start: 2022-05-01 | End: 2022-05-02 | Stop reason: HOSPADM

## 2022-05-01 RX ORDER — HYDROMORPHONE HCL/PF 1 MG/ML
0.5 SYRINGE (ML) INJECTION
Status: DISCONTINUED | OUTPATIENT
Start: 2022-05-01 | End: 2022-05-01 | Stop reason: HOSPADM

## 2022-05-01 RX ORDER — SODIUM CHLORIDE, SODIUM LACTATE, POTASSIUM CHLORIDE, CALCIUM CHLORIDE 600; 310; 30; 20 MG/100ML; MG/100ML; MG/100ML; MG/100ML
INJECTION, SOLUTION INTRAVENOUS CONTINUOUS PRN
Status: DISCONTINUED | OUTPATIENT
Start: 2022-05-01 | End: 2022-05-01

## 2022-05-01 RX ORDER — ONDANSETRON 2 MG/ML
4 INJECTION INTRAMUSCULAR; INTRAVENOUS ONCE AS NEEDED
Status: DISCONTINUED | OUTPATIENT
Start: 2022-05-01 | End: 2022-05-01 | Stop reason: HOSPADM

## 2022-05-01 RX ORDER — VANCOMYCIN HYDROCHLORIDE 1 G/20ML
INJECTION, POWDER, LYOPHILIZED, FOR SOLUTION INTRAVENOUS AS NEEDED
Status: DISCONTINUED | OUTPATIENT
Start: 2022-05-01 | End: 2022-05-01 | Stop reason: HOSPADM

## 2022-05-01 RX ORDER — DABIGATRAN ETEXILATE 150 MG/1
150 CAPSULE, COATED PELLETS ORAL EVERY 12 HOURS SCHEDULED
Status: DISCONTINUED | OUTPATIENT
Start: 2022-05-01 | End: 2022-05-02 | Stop reason: HOSPADM

## 2022-05-01 RX ORDER — MAGNESIUM HYDROXIDE 1200 MG/15ML
LIQUID ORAL AS NEEDED
Status: DISCONTINUED | OUTPATIENT
Start: 2022-05-01 | End: 2022-05-01 | Stop reason: HOSPADM

## 2022-05-01 RX ORDER — ONDANSETRON 2 MG/ML
INJECTION INTRAMUSCULAR; INTRAVENOUS AS NEEDED
Status: DISCONTINUED | OUTPATIENT
Start: 2022-05-01 | End: 2022-05-01

## 2022-05-01 RX ADMIN — FENTANYL CITRATE 25 MCG: 50 INJECTION INTRAMUSCULAR; INTRAVENOUS at 09:26

## 2022-05-01 RX ADMIN — DILTIAZEM HYDROCHLORIDE 30 MG: 30 TABLET, FILM COATED ORAL at 17:25

## 2022-05-01 RX ADMIN — FENTANYL CITRATE 25 MCG: 50 INJECTION INTRAMUSCULAR; INTRAVENOUS at 09:35

## 2022-05-01 RX ADMIN — FENTANYL CITRATE 25 MCG: 50 INJECTION INTRAMUSCULAR; INTRAVENOUS at 09:41

## 2022-05-01 RX ADMIN — EPHEDRINE SULFATE 10 MG: 50 INJECTION INTRAVENOUS at 08:46

## 2022-05-01 RX ADMIN — DABIGATRAN ETEXILATE MESYLATE 150 MG: 150 CAPSULE ORAL at 21:56

## 2022-05-01 RX ADMIN — FENTANYL CITRATE 50 MCG: 50 INJECTION INTRAMUSCULAR; INTRAVENOUS at 08:25

## 2022-05-01 RX ADMIN — POTASSIUM CHLORIDE 40 MEQ: 1500 TABLET, EXTENDED RELEASE ORAL at 10:44

## 2022-05-01 RX ADMIN — SODIUM CHLORIDE, SODIUM LACTATE, POTASSIUM CHLORIDE, AND CALCIUM CHLORIDE: .6; .31; .03; .02 INJECTION, SOLUTION INTRAVENOUS at 09:25

## 2022-05-01 RX ADMIN — FENTANYL CITRATE 25 MCG: 50 INJECTION INTRAMUSCULAR; INTRAVENOUS at 09:45

## 2022-05-01 RX ADMIN — EPHEDRINE SULFATE 10 MG: 50 INJECTION INTRAVENOUS at 08:41

## 2022-05-01 RX ADMIN — FENTANYL CITRATE 25 MCG: 50 INJECTION INTRAMUSCULAR; INTRAVENOUS at 09:38

## 2022-05-01 RX ADMIN — SODIUM CHLORIDE, SODIUM LACTATE, POTASSIUM CHLORIDE, AND CALCIUM CHLORIDE: .6; .31; .03; .02 INJECTION, SOLUTION INTRAVENOUS at 08:15

## 2022-05-01 RX ADMIN — EPHEDRINE SULFATE 10 MG: 50 INJECTION INTRAVENOUS at 09:05

## 2022-05-01 RX ADMIN — TERAZOSIN HYDROCHLORIDE 4 MG: 1 CAPSULE ORAL at 17:24

## 2022-05-01 RX ADMIN — HYDROMORPHONE HYDROCHLORIDE 0.5 MG: 1 INJECTION, SOLUTION INTRAMUSCULAR; INTRAVENOUS; SUBCUTANEOUS at 09:46

## 2022-05-01 RX ADMIN — FENTANYL CITRATE 25 MCG: 50 INJECTION INTRAMUSCULAR; INTRAVENOUS at 09:20

## 2022-05-01 RX ADMIN — MIDAZOLAM 2 MG: 1 INJECTION INTRAMUSCULAR; INTRAVENOUS at 08:18

## 2022-05-01 RX ADMIN — DILTIAZEM HYDROCHLORIDE 30 MG: 30 TABLET, FILM COATED ORAL at 11:47

## 2022-05-01 RX ADMIN — ATORVASTATIN CALCIUM 40 MG: 40 TABLET, FILM COATED ORAL at 17:24

## 2022-05-01 RX ADMIN — RAMIPRIL 10 MG: 10 CAPSULE ORAL at 21:55

## 2022-05-01 RX ADMIN — VANCOMYCIN HYDROCHLORIDE 1250 MG: 10 INJECTION, POWDER, LYOPHILIZED, FOR SOLUTION INTRAVENOUS at 10:43

## 2022-05-01 RX ADMIN — INSULIN LISPRO 2 UNITS: 100 INJECTION, SOLUTION INTRAVENOUS; SUBCUTANEOUS at 17:24

## 2022-05-01 RX ADMIN — CARVEDILOL 50 MG: 25 TABLET, FILM COATED ORAL at 17:25

## 2022-05-01 RX ADMIN — DABIGATRAN ETEXILATE MESYLATE 150 MG: 150 CAPSULE ORAL at 11:47

## 2022-05-01 RX ADMIN — PROPOFOL 200 MG: 10 INJECTION, EMULSION INTRAVENOUS at 08:25

## 2022-05-01 RX ADMIN — ACETAMINOPHEN 650 MG: 325 TABLET ORAL at 11:49

## 2022-05-01 RX ADMIN — LIDOCAINE HYDROCHLORIDE 100 MG: 20 INJECTION INTRAVENOUS at 08:25

## 2022-05-01 RX ADMIN — ACETAMINOPHEN 650 MG: 325 TABLET ORAL at 18:34

## 2022-05-01 RX ADMIN — ONDANSETRON 4 MG: 2 INJECTION INTRAMUSCULAR; INTRAVENOUS at 09:05

## 2022-05-01 NOTE — ANESTHESIA POSTPROCEDURE EVALUATION
Post-Op Assessment Note    CV Status:  Stable  Pain Score: 0    Pain management: adequate     Mental Status:  Alert and awake   Hydration Status:  Euvolemic   PONV Controlled:  Controlled   Airway Patency:  Patent      Post Op Vitals Reviewed: Yes      Staff: CRNA         No complications documented      BP   101/79   Temp   97 3   Pulse  85   Resp   18   SpO2   95% RA

## 2022-05-01 NOTE — ASSESSMENT & PLAN NOTE
Lab Results   Component Value Date    EGFR 49 05/01/2022    EGFR 57 04/30/2022    EGFR 62 04/29/2022    CREATININE 1 56 (H) 05/01/2022    CREATININE 1 38 (H) 04/30/2022    CREATININE 1 29 04/29/2022     · Creatinine 1 56 today   · Baseline appears to be around 1 4   · Continue to monitor renal function

## 2022-05-01 NOTE — ANESTHESIA PREPROCEDURE EVALUATION
Procedure:  CLOSURE DELAYED PRIMARY (Left Foot)    Relevant Problems   CARDIO   (+) Atrial fibrillation with RVR (HCC)   (+) Dysrhythmias   (+) HTN (hypertension)      ENDO   (+) Diabetes mellitus, type 2 (HCC)      PULMONARY   (+) Sleep apnea        Physical Exam    Airway    Mallampati score: II         Dental   No notable dental hx     Cardiovascular      Pulmonary      Other Findings        Anesthesia Plan  ASA Score- 3     Anesthesia Type- general with ASA Monitors  Additional Monitors:   Airway Plan: LMA  Comment: I, Dr Nelson Rosales, the attending physician, have personally seen and evaluated the patient prior to anesthetic care  I have reviewed the pre-anesthetic record, and other medical records if appropriate to the anesthetic care  If a CRNA is involved in the case, I have reviewed the CRNA assessment, if present, and agree  The patient is in a suitable condition to proceed with my formulated anesthetic plan          Plan Factors-    Chart reviewed  Induction- intravenous  Postoperative Plan-     Informed Consent- Anesthetic plan and risks discussed with patient  I personally reviewed this patient with the CRNA  Discussed and agreed on the Anesthesia Plan with the LAITH Lal

## 2022-05-01 NOTE — ASSESSMENT & PLAN NOTE
· Noted histroy s/p ablation in 2017 with return of a fib and multiple cardioversions in the past   · Follows with Dr Kirit Pastor of EP at [facility]  Recently he has been in NSR since cardioversion after suffering covid in early January of this year  · Noted to have intermittent RVR yesterday which broke and currently having fast heart rates again   · Currently on telemetry  · On pradaxan restarted yesterday 4/29  · Will be held tonight for procedure tomorrow     · Coreg 50 mg BID for rate control   · 30 mg cardizam q 8 hours added by cardiology   · Rates improved

## 2022-05-01 NOTE — ASSESSMENT & PLAN NOTE
· Left foot infected post op hematoma  · S/p left 5th metatarsal resection (DOS: 4/8/22)  · S/p washout 4/29 repeat washout today at 8 am   · On IV Vancomycin given operative cultures positive for MRSA   · Day 5  · Plan for po bactrim on discharge for total 7 days of treatment

## 2022-05-01 NOTE — PROGRESS NOTES
[facility]  Progress Note - Hector Rivera 1970, 46 y o  male MRN: [id number]  Unit/Bed#: [facility] 410-01 Encounter: [id number]  Primary Care Provider: Ne Manrique MD   Date and time admitted to hospital: 4/27/2022  7:14 PM    * Pre-op evaluation  Assessment & Plan  · Pre op eval patient appropriate for surgery per chart review   · Plan for repeat washout 4/30  · Internal medicine now primary given infection, and rapid a fib  Infected hematoma  Assessment & Plan  · Left foot infected post op hematoma  · S/p left 5th metatarsal resection (DOS: 4/8/22)  · S/p washout 4/29 repeat washout today at 8 am   · On IV Vancomycin given operative cultures positive for MRSA   · Day 5  · Plan for po bactrim on discharge for total 7 days of treatment     Stage 3 chronic kidney disease [facility])  Assessment & Plan  Lab Results   Component Value Date    EGFR 49 05/01/2022    EGFR 57 04/30/2022    EGFR 62 04/29/2022    CREATININE 1 56 (H) 05/01/2022    CREATININE 1 38 (H) 04/30/2022    CREATININE 1 29 04/29/2022     · Creatinine 1 56 today   · Baseline appears to be around 1 4   · Continue to monitor renal function     Atrial fibrillation with RVR ([address] )  Assessment & Plan  · Noted histroy s/p ablation in 2017 with return of a fib and multiple cardioversions in the past   · Follows with Dr Zenaida Villalobos of EP at [facility]  Recently he has been in NSR since cardioversion after suffering covid in early January of this year  · Noted to have intermittent RVR yesterday which broke and currently having fast heart rates again   · Currently on telemetry     · On pradaxan restarted 5/1  · Coreg 50 mg BID for rate control   · 30 mg cardizam q 8 hours added by cardiology   · Transition to cardizam  mg in am tomorrow   · Rates improved     Diabetes mellitus, type 2 [facility])  Assessment & Plan  Lab Results   Component Value Date    HGBA1C 13 3 (H) 04/06/2021       Recent Labs     04/30/22  1559 04/30/22 2053 Assumed patient care. 22  2149 22  0603   POCGLU 153* 98 102 85       Blood Sugar Average: Last 72 hrs:  (P) 131 75   · Home insulin 65 AM 90 PM lantus with sliding scale   · Currently on 60 units AM and changed to 90 units pm per patient request    · Continue SSI   · Adjust as needed     HTN (hypertension)  Assessment & Plan  · Reviewed and stable   · Continue home bp meds   · cardizam 30 mg q 6 hrs added  by cardiology   · Amlodipine d/yeyo   · cardizam  mg po start tomorrow AM       VTE Pharmacologic Prophylaxis: VTE Score: 2 Low Risk (Score 0-2) - Encourage Ambulation  Patient Centered Rounds: I performed bedside rounds with nursing staff today  Discussions with Specialists or Other Care Team Provider: podiatry     Education and Discussions with Family / Patient: Updated  (wife) at bedside  Time Spent for Care: 45 minutes  More than 50% of total time spent on counseling and coordination of care as described above  Current Length of Stay: 4 day(s)  Current Patient Status: Inpatient   Certification Statement: The patient will continue to require additional inpatient hospital stay due to pending clerance by podiatry   Discharge Plan: Anticipate discharge in 24-48 hrs to home  Code Status: Level 1 - Full Code    Subjective:   Patient is feeling well post op  No chest pain, palpitations, or other concerns  Objective:     Vitals:   Temp (24hrs), Av °F (36 7 °C), Min:97 7 °F (36 5 °C), Max:98 4 °F (36 9 °C)    Temp:  [97 7 °F (36 5 °C)-98 4 °F (36 9 °C)] 98 1 °F (36 7 °C)  HR:  [] 81  Resp:  [16-19] 19  BP: (116-142)/(84-93) 124/90  SpO2:  [95 %-98 %] 97 %  Body mass index is 35 88 kg/m²  Input and Output Summary (last 24 hours): Intake/Output Summary (Last 24 hours) at 2022 0726  Last data filed at 2022 0659  Gross per 24 hour   Intake 250 ml   Output 1650 ml   Net -1400 ml       Physical Exam:   Physical Exam  Vitals and nursing note reviewed     Constitutional: General: He is not in acute distress  Appearance: He is obese  HENT:      Head: Normocephalic and atraumatic  Mouth/Throat:      Mouth: Mucous membranes are moist       Pharynx: Oropharynx is clear  Eyes:      Conjunctiva/sclera: Conjunctivae normal       Pupils: Pupils are equal, round, and reactive to light  Cardiovascular:      Rate and Rhythm: Normal rate  Rhythm irregular  Pulses: Normal pulses  Heart sounds: Normal heart sounds  No murmur heard  Pulmonary:      Effort: Pulmonary effort is normal  No respiratory distress  Breath sounds: Normal breath sounds  No wheezing or rales  Abdominal:      General: Abdomen is flat  There is no distension  Palpations: Abdomen is soft  Tenderness: There is no abdominal tenderness  Musculoskeletal:         General: Tenderness present  Cervical back: No muscular tenderness  Right lower leg: No edema  Left lower leg: No edema  Comments: Dressing to LLE    Skin:     General: Skin is warm and dry  Capillary Refill: Capillary refill takes less than 2 seconds  Coloration: Skin is not jaundiced  Neurological:      General: No focal deficit present  Mental Status: He is alert and oriented to person, place, and time  Cranial Nerves: No cranial nerve deficit  Psychiatric:         Mood and Affect: Mood normal           Additional Data:     Labs:  Results from last 7 days   Lab Units 05/01/22 0448 04/30/22  0435 04/30/22  0435   WBC Thousand/uL 6 33   < > 7 20   HEMOGLOBIN g/dL 13 0   < > 13 7   HEMATOCRIT % 38 0   < > 40 0   PLATELETS Thousands/uL 209   < > 185   NEUTROS PCT %  --   --  69   LYMPHS PCT %  --   --  21   MONOS PCT %  --   --  8   EOS PCT %  --   --  2    < > = values in this interval not displayed       Results from last 7 days   Lab Units 05/01/22 0448 04/28/22  0734 04/27/22  2232   SODIUM mmol/L 141   < > 141   POTASSIUM mmol/L 3 3*   < > 3 6   CHLORIDE mmol/L 105   < > 105   CO2 mmol/L 30   < > 32   BUN mg/dL 32*   < > 50*   CREATININE mg/dL 1 56*   < > 1 56*   ANION GAP mmol/L 6   < > 4   CALCIUM mg/dL 8 8   < > 9 3   ALBUMIN g/dL  --   --  3 6   TOTAL BILIRUBIN mg/dL  --   --  0 64   ALK PHOS U/L  --   --  108   ALT U/L  --   --  33   AST U/L  --   --  26   GLUCOSE RANDOM mg/dL 80   < > 109    < > = values in this interval not displayed  Results from last 7 days   Lab Units 05/01/22  0603 04/30/22  2149 04/30/22  2053 04/30/22  1559 04/30/22  1040 04/30/22  0627 04/29/22  2144 04/29/22  1631 04/29/22  1109 04/29/22  0716 04/28/22  2120 04/28/22  1635   POC GLUCOSE mg/dl 85 102 98 153* 163* 88 192* 164* 213* 167* 136 206*               Lines/Drains:  Invasive Devices  Report    Peripheral Intravenous Line            Peripheral IV 04/27/22 Dorsal (posterior); Left Forearm 3 days                  Telemetry:  Telemetry Orders (From admission, onward)             Telemetry monitoring  Continuous x 48 hours        References:    Telemetry Guidelines   Question:  Reason for 48 Hour Telemetry  Answer:  Arrhythmias Requiring Medical Therapy (eg  SVT, Vtach/fib, Bradycardia, Uncontrolled A-fib)                 Telemetry Reviewed: Atrial fibrillation  HR averaging 70  Indication for Continued Telemetry Use: Arrthymias requiring medical therapy             Imaging: No pertinent imaging reviewed      Recent Cultures (last 7 days):         Last 24 Hours Medication List:   Current Facility-Administered Medications   Medication Dose Route Frequency Provider Last Rate    acetaminophen  650 mg Oral Q4H PRN Yvonne Hankins DPM      amLODIPine  5 mg Oral BID Yvonne Hankins DPM      atorvastatin  40 mg Oral Daily With [address] Johanna Baez DPM      carvedilol  50 mg Oral BID With Meals Yvonne Hankins DPM      chlorthalidone  50 mg Oral Daily Paxton Cabral      diltiazem  30 mg Oral Q6H [address] Klaus Mata MD      fluticasone  2 spray Each Nare PRN Yvonne Hankins DPM      HYDROmorphone  0 5 mg Intravenous Q1H PRN Cinderella Jovanni, DPM      insulin glargine  60 Units Subcutaneous Daily With Breakfast Cinderella Jovanni, DPM      insulin glargine  90 Units Subcutaneous HS Bob Castañeda PA-C      insulin lispro  2-12 Units Subcutaneous 4x Daily (AC & HS) Bob Castañeda PA-C      metoprolol  2 5 mg Intravenous Q6H PRN Bob Castañeda PA-C      naloxone  0 04 mg Intravenous Q1MIN PRN Cinderella Jovanni, DPM      ondansetron  4 mg Intravenous Q6H PRN Cinderella Jovanni, DPM      oxyCODONE  10 mg Oral Q4H PRN Cinderella Jovanni, DPM      oxyCODONE  5 mg Oral Q4H PRN Cinderella Jovanni, DPM      polyethylene glycol  17 g Oral Daily PRN Cinderella Jovanni, DPM      potassium chloride  40 mEq Oral Once Tarik Prater PA-C      ramipril  10 mg Oral QPM Ramiro Saba PA-C      ramipril  20 mg Oral QAM Ramiro Saba PA-C      terazosin  4 mg Oral BID Cinderella Jovanni, DPM      vancomycin  12 5 mg/kg (Adjusted) Intravenous Q12H Cinderella Jovanni, DPM Stopped (05/01/22 0047)        Today, Patient Was Seen By: Tarik Prater PA-C    **Please Note: This note may have been constructed using a voice recognition system  **

## 2022-05-01 NOTE — PROGRESS NOTES
Vancomycin IV Pharmacy-to-Dose Consultation    Gabe Persaud is a 46 y o  male who is currently receiving Vancomycin IV with management by the Pharmacy Consult service  Assessment/Plan:  The patient was reviewed  Scr 1 56 today up from 1 38 from 1 29  0 8 ml/kg/hr urine output yesterday  No signs or symptoms of infusion reactions were documented in the chart  Trough today is therapeutic at 14 6    Based on todays assessment, continue current vancomycin (day # 5) dosing of 1250 mg q12h, with a plan for trough to be drawn at 1030 on 5/6  We will continue to follow the patients culture results and clinical progress daily      Dameon Baptiste, PharmD  Emergency Medicine Clinical Pharmacist    or Yanique

## 2022-05-01 NOTE — PROGRESS NOTES
Cardiology Progress Note - Shane Jon 46 y o  male MRN: [id number]    Unit/Bed#: [facility] 410-01 Encounter: [id number]      Assessment:  1  Paroxysmal atrial fibrillation/flutter       A) s/p PVI isolation 2017, B) s/p cardioversion 1/2022  2  Infected left foot hematoma status post left 5th metatarsal resection  3  Benign essential hypertension  4  Dyslipidemia  5  Type 2 diabetes mellitus  6  ROBERTO on CPAP  7  Obesity - BMI 36     Plan:  1  Heart rates better controlled with addition of Cardizem, bradycardic but during sleep  2  Continue current regimen and transition to Cardizem  mg in a m   3   Discontinue amlodipine  4  Resume Pradaxa postoperatively  5  Continue nightly CPAP  6  On statin therapy in the setting of type 2 diabetes, blood pressure generally acceptable   7  Definitive management per outpatient electrophysiologist at [facility]:   Patient seen and examined  No significant events overnight  Objective:     Vitals: Blood pressure 124/90, pulse 81, temperature 98 1 °F (36 7 °C), temperature source Oral, resp  rate 19, height 6' (1 829 m), weight 120 kg (264 lb 8 8 oz), SpO2 97 %  , Body mass index is 35 88 kg/m² ,   Orthostatic Blood Pressures      Most Recent Value   Blood Pressure 124/90 filed at 05/01/2022 4866   Patient Position - Orthostatic VS Lying filed at 05/01/2022 8592            Intake/Output Summary (Last 24 hours) at 5/1/2022 0731  Last data filed at 5/1/2022 4518  Gross per 24 hour   Intake 250 ml   Output 1650 ml   Net -1400 ml         Physical Exam:    GEN: Shane Jon appears well, alert and oriented x 3, pleasant and cooperative   HEENT: pupils equal, round, and reactive to light; extraocular muscles intact  NECK: supple, no carotid bruits   HEART:  Irregularly irregular, variable S1/S2, no murmur  LUNGS: clear to auscultation bilaterally; no wheezes, rales, or rhonchi   ABDOMEN: normal bowel sounds, soft, no tenderness, no distention  EXTREMITIES:  Left foot with dressing intact, would VAC in place   NEURO: no focal findings   SKIN: normal without suspicious lesions on exposed skin    Medications:      Current Facility-Administered Medications:     acetaminophen (TYLENOL) tablet 650 mg, 650 mg, Oral, Q4H PRN, Lisa Hall, LIGIAM, 650 mg at 04/30/22 2059    amLODIPine (NORVASC) tablet 5 mg, 5 mg, Oral, BID, Nejoshua Hall DPM, 5 mg at 04/30/22 1703    atorvastatin (LIPITOR) tablet 40 mg, 40 mg, Oral, Daily With Dinner, Nea Rafael, DPM, 40 mg at 04/30/22 1703    carvedilol (COREG) tablet 50 mg, 50 mg, Oral, BID With Meals, Nejoshua Hall DPM, 50 mg at 04/30/22 1703    chlorthalidone tablet 50 mg, 50 mg, Oral, Daily, Nejoshua Hall DPM, 50 mg at 04/30/22 1219    diltiazem (CARDIZEM) tablet 30 mg, 30 mg, Oral, Q6H [facility], Marck Sheth MD, 30 mg at 04/30/22 2313    fluticasone (FLONASE) 50 mcg/act nasal spray 2 spray, 2 spray, Each Nare, PRN, Lisa Hall DPM    HYDROmorphone (DILAUDID) injection 0 5 mg, 0 5 mg, Intravenous, Q1H PRN, Lisa Hall DPM    insulin glargine (LANTUS) subcutaneous injection 60 Units 0 6 mL, 60 Units, Subcutaneous, Daily With Breakfast, Lisa Hall DPM, 60 Units at 04/30/22 0750    insulin glargine (LANTUS) subcutaneous injection 90 Units 0 9 mL, 90 Units, Subcutaneous, HS, Crystal Canas PA-C, 90 Units at 04/29/22 1810    insulin lispro (HumaLOG) 100 units/mL subcutaneous injection 2-12 Units, 2-12 Units, Subcutaneous, 4x Daily (AC & HS), 2 Units at 04/30/22 1703 **AND** Fingerstick Glucose (POCT), , , 4x Daily AC and at bedtime, Crystal Canas PA-C    metoprolol (LOPRESSOR) injection 2 5 mg, 2 5 mg, Intravenous, Q6H PRN, Crystal Canas PA-C, 2 5 mg at 04/30/22 0002    naloxone (NARCAN) 0 04 mg/mL syringe 0 04 mg, 0 04 mg, Intravenous, Q1MIN PRN, Nelta Hall, DPM    ondansetron [facility]) injection 4 mg, 4 mg, Intravenous, Q6H PRN, Nelta Hall, DPM    oxyCODONE (ROXICODONE) immediate release tablet 10 mg, 10 mg, Oral, Q4H PRN, Cinderella Jovanni, DPM    oxyCODONE (ROXICODONE) IR tablet 5 mg, 5 mg, Oral, Q4H PRN, Cinderella Jovanni, DPM, 5 mg at 04/28/22 1510    polyethylene glycol (MIRALAX) packet 17 g, 17 g, Oral, Daily PRN, Cinderella Jovanni, DPM    potassium chloride (K-DUR,KLOR-CON) CR tablet 40 mEq, 40 mEq, Oral, Once, Ramiro Saba PA-C    ramipril (ALTACE) capsule 10 mg, 10 mg, Oral, QPM, Ramiro Saba PA-C, 10 mg at 04/30/22 2059    ramipril (ALTACE) capsule 20 mg, 20 mg, Oral, QAM, Ramiro Saba PA-C    terazosin (HYTRIN) capsule 4 mg, 4 mg, Oral, BID, Cinderella Jovanni, DPM, 4 mg at 04/30/22 1703    vancomycin (VANCOCIN) 1,250 mg in sodium chloride 0 9 % 250 mL IVPB, 12 5 mg/kg (Adjusted), Intravenous, Q12H, Cinderella Jovanni, DPM, Stopped at 05/01/22 0047     Labs & Results:        Results from last 7 days   Lab Units 05/01/22  0448 04/30/22  0435 04/29/22  0523   WBC Thousand/uL 6 33 7 20 8 68   HEMOGLOBIN g/dL 13 0 13 7 13 8   HEMATOCRIT % 38 0 40 0 40 0   PLATELETS Thousands/uL 209 185 179         Results from last 7 days   Lab Units 05/01/22  0448 04/30/22  0436 04/29/22  0523 04/28/22  0734 04/27/22  2232   POTASSIUM mmol/L 3 3* 3 4* 4 3   < > 3 6   CHLORIDE mmol/L 105 103 102   < > 105   CO2 mmol/L 30 31 31   < > 32   BUN mg/dL 32* 30* 26*   < > 50*   CREATININE mg/dL 1 56* 1 38* 1 29   < > 1 56*   CALCIUM mg/dL 8 8 9 4 9 5   < > 9 3   ALK PHOS U/L  --   --   --   --  108   ALT U/L  --   --   --   --  33   AST U/L  --   --   --   --  26    < > = values in this interval not displayed  Results from last 7 days   Lab Units 04/30/22  0435   MAGNESIUM mg/dL 1 8         Counseling / Coordination of Care  Total floor / unit time spent today 25  minutes  Greater than 50% of total time was spent with the patient and / or family counseling and / or coordination of care

## 2022-05-01 NOTE — ASSESSMENT & PLAN NOTE
Lab Results   Component Value Date    HGBA1C 13 3 (H) 04/06/2021       Recent Labs     04/30/22  1559 04/30/22 2053 04/30/22 2149 05/01/22  0603   POCGLU 153* 98 102 85       Blood Sugar Average: Last 72 hrs:  (P) 131 75   · Home insulin 65 AM 90 PM lantus with sliding scale   · Currently on 60 units AM and changed to 90 units pm per patient request    · Continue SSI   · Adjust as needed

## 2022-05-01 NOTE — PROGRESS NOTES
Podiatry - Progress Note  Patient: Claudette Bishop 46 y o  male   MRN: [id number]  PCP: Iain Martínez MD  Unit/Bed#: [facility] 410-01 Encounter: [id number]  Date Of Visit: 22    ASSESSMENT:    Claudette Bishop is a 46 y o  male with:    1  Left foot infected post op hematoma            -S/p left foot washout with resection of 5th metatarsal (DOS 22)  2  S/p left 5th metatarsal resection (DOS: 22)  3  T2DM  4  A-fib  5  HTN        PLAN:    · Patient to go to OR today,22, for  left foot delayed primary closure with application of antibiotic beads with Dr Kaylyn Denton  · Consent to be signed with surgeon prior to procedure  · Confirmed NPO status  · H&P, vitals, and current labs reviewed  No acute changes noted  · Alternatives, risks, and complications discussed with patient  · All questions answered  No guarantees given of outcome  · Rest of medical care per primary team        SUBJECTIVE:     The patient was seen, evaluated, and assessed at bedside today  The patient was awake, alert, and in no acute distress  Patient confirmed NPO status  All questions and concerns regarding the surgical procedure addressed  Patient understands risks vs benefits of procedure and remains amenable with plan for surgery today  Patient denies N/V/F/chills/SOB/CP  OBJECTIVE:     Vitals:   /90 (BP Location: Left arm)   Pulse 81   Temp 98 1 °F (36 7 °C) (Oral)   Resp 19   Ht 6' (1 829 m)   Wt 120 kg (264 lb 8 8 oz)   SpO2 97%   BMI 35 88 kg/m²     Temp (24hrs), Av °F (36 7 °C), Min:97 7 °F (36 5 °C), Max:98 4 °F (36 9 °C)      Physical Exam:     General:  Alert, cooperative, and in no distress  Lower extremity exam:  Cardiovascular status at baseline  Neurological status at baseline  Musculoskeletal status at baseline  No calf tenderness noted bilaterally  Dressing left intact to the Operating Room       Wound vac running well at 125mmHg low continuous without any noted leaks or blockages  Additional Data:     Labs:    Results from last 7 days   Lab Units 05/01/22 0448 04/30/22  0435 04/30/22  0435   WBC Thousand/uL 6 33   < > 7 20   HEMOGLOBIN g/dL 13 0   < > 13 7   HEMATOCRIT % 38 0   < > 40 0   PLATELETS Thousands/uL 209   < > 185   NEUTROS PCT %  --   --  69   LYMPHS PCT %  --   --  21   MONOS PCT %  --   --  8   EOS PCT %  --   --  2    < > = values in this interval not displayed  Results from last 7 days   Lab Units 05/01/22 0448 04/28/22  0734 04/27/22  2232   POTASSIUM mmol/L 3 3*   < > 3 6   CHLORIDE mmol/L 105   < > 105   CO2 mmol/L 30   < > 32   BUN mg/dL 32*   < > 50*   CREATININE mg/dL 1 56*   < > 1 56*   CALCIUM mg/dL 8 8   < > 9 3   ALK PHOS U/L  --   --  108   ALT U/L  --   --  33   AST U/L  --   --  26    < > = values in this interval not displayed  * I Have Reviewed All Lab Data Listed Above  Imaging: I have personally reviewed pertinent films in PACS  EKG, Pathology, and Other Studies: I have personally reviewed pertinent reports  ** Please Note: Portions of the record may have been created with voice recognition software  Occasional wrong word or "sound a like" substitutions may have occurred due to the inherent limitations of voice recognition software  Read the chart carefully and recognize, using context, where substitutions have occurred   **

## 2022-05-01 NOTE — OP NOTE
OPERATIVE REPORT - Podiatry  PATIENT NAME: Gabe Persaud    :  1970  MRN: [id number]  Pt Location: BE OR ROOM 04    SURGERY DATE: 2022    Surgeon(s) and Role:     * Deloris Hodgkins, DPM - Primary     * Armando Cole DPM - Assisting    Pre-op Diagnosis:  Type 2 diabetes mellitus without complication, with long-term current use of insulin (HCC) [E11 9, Z79 4]  Infected hematoma [T14  8XXA, L08 9]    Post-Op Diagnosis Codes: * Type 2 diabetes mellitus without complication, with long-term current use of insulin (HCC) [E11 9, Z79 4]     * Infected hematoma [T14  8XXA, L08 9]    Procedure(s) (LRB):  CLOSURE DELAYED PRIMARY VAC REMOVED (Left)    Specimen(s):  ID Type Source Tests Collected by Time Destination   A : left fifth metatarsal bone Tissue Foot, Left ANAEROBIC CULTURE AND GRAM STAIN, CULTURE, TISSUE AND GRAM STAIN Deloris Hodgkins, DPM 2022 0848        Estimated Blood Loss:   Minimal    Drains:  * No LDAs found *    Anesthesia Type:   Choice with 6 ml of 1% Lidocaine and 0 5% Bupivacaine in a 1:1 mixture    Hemostasis:  Direct compression, electrocautery    Materials:  Implant Name Type Inv  Item Serial No   Lot No  LRB No  Used Action   OSTEOSET RESORBALE MINI BEAD FAST CURE 5ML - [id number]  OSTEOSET RESORBALE MINI BEAD FAST CURE 5ML  [facility] [id number] Left 1 Implanted         Injectables:  None    Operative Findings:  Consistent with Diagnosis    Complications:   None    Procedure and Technique:     Under mild sedation, the patient was brought into the operating room and remained on the hospital bed in the supine position  IV sedation was achieved by anesthesia team and a universal timeout was performed where all parties are in agreement of correct patient, correct procedure and correct site  A local V block was performed consisting of 6 ml of local anesthetic  The foot was then prepped and draped in the usual aseptic manner  Attention was then directed to left foot wound    DELGADO galindo was used to take bone  from 5th metatarsal and placed on back table to be sent for culture  Surgical wound was irrigated with copious amounts of normal sterile saline  No noted purulence or sinus tracking in wound  Wound was 100% granular with moderate bleeding  Osteoset resorbable mini beads were mixed with vancomycin powder, set and then placed in wound bed  Skin edges were reapproximated and closure was obtained utilizing nylon  The foot was then cleansed and dried  The incision site was dressed with Betadine-soaked Adaptic, gauze  This was then covered with a Kerlix and an ACE wrap  The patient tolerated the procedure and anesthesia well without immediate complications and transferred to PACU with vital signs stable  As with many limb salvage procedures, we contemplate the possibility of performing further stages to this procedure  Procedures may include debridements, delayed closure, plastic surgery techniques, or more proximal amputations  This procedure may be considered part of a multi-staged limb salvage treatment plan  Dr Vargas Rowley was present during the entire procedure and participated in all key aspects  SIGNATURE: Dominik Long DPM  DATE: May 1, 2022  TIME: 9:26 AM      Portions of the record may have been created with voice recognition software  Occasional wrong word or "sound a like" substitutions may have occurred due to the inherent limitations of voice recognition software  Read the chart carefully and recognize, using context, where substitutions have occurred

## 2022-05-02 VITALS
BODY MASS INDEX: 35.83 KG/M2 | DIASTOLIC BLOOD PRESSURE: 84 MMHG | HEIGHT: 72 IN | HEART RATE: 94 BPM | SYSTOLIC BLOOD PRESSURE: 130 MMHG | OXYGEN SATURATION: 99 % | WEIGHT: 264.55 LBS | TEMPERATURE: 98 F | RESPIRATION RATE: 14 BRPM

## 2022-05-02 LAB
ANION GAP SERPL CALCULATED.3IONS-SCNC: 4 MMOL/L (ref 4–13)
BUN SERPL-MCNC: 31 MG/DL (ref 5–25)
CALCIUM SERPL-MCNC: 8.7 MG/DL (ref 8.3–10.1)
CHLORIDE SERPL-SCNC: 105 MMOL/L (ref 100–108)
CO2 SERPL-SCNC: 30 MMOL/L (ref 21–32)
CREAT SERPL-MCNC: 1.32 MG/DL (ref 0.6–1.3)
GFR SERPL CREATININE-BSD FRML MDRD: 61 ML/MIN/1.73SQ M
GLUCOSE SERPL-MCNC: 100 MG/DL (ref 65–140)
GLUCOSE SERPL-MCNC: 117 MG/DL (ref 65–140)
GLUCOSE SERPL-MCNC: 90 MG/DL (ref 65–140)
GLUCOSE SERPL-MCNC: 92 MG/DL (ref 65–140)
POTASSIUM SERPL-SCNC: 3.6 MMOL/L (ref 3.5–5.3)
SODIUM SERPL-SCNC: 139 MMOL/L (ref 136–145)

## 2022-05-02 PROCEDURE — 99231 SBSQ HOSP IP/OBS SF/LOW 25: CPT | Performed by: INTERNAL MEDICINE

## 2022-05-02 PROCEDURE — NC001 PR NO CHARGE: Performed by: PODIATRIST

## 2022-05-02 PROCEDURE — 82948 REAGENT STRIP/BLOOD GLUCOSE: CPT

## 2022-05-02 PROCEDURE — 99239 HOSP IP/OBS DSCHRG MGMT >30: CPT | Performed by: INTERNAL MEDICINE

## 2022-05-02 PROCEDURE — 80048 BASIC METABOLIC PNL TOTAL CA: CPT

## 2022-05-02 RX ORDER — SULFAMETHOXAZOLE AND TRIMETHOPRIM 800; 160 MG/1; MG/1
2 TABLET ORAL EVERY 12 HOURS SCHEDULED
Qty: 28 TABLET | Refills: 0 | Status: SHIPPED | OUTPATIENT
Start: 2022-05-02 | End: 2022-05-09

## 2022-05-02 RX ORDER — DILTIAZEM HYDROCHLORIDE 120 MG/1
120 CAPSULE, COATED, EXTENDED RELEASE ORAL DAILY
Qty: 30 CAPSULE | Refills: 0 | Status: SHIPPED | OUTPATIENT
Start: 2022-05-03 | End: 2022-07-26

## 2022-05-02 RX ADMIN — CHLORTHALIDONE 50 MG: 25 TABLET ORAL at 08:44

## 2022-05-02 RX ADMIN — DILTIAZEM HYDROCHLORIDE 120 MG: 120 CAPSULE, COATED, EXTENDED RELEASE ORAL at 08:44

## 2022-05-02 RX ADMIN — VANCOMYCIN HYDROCHLORIDE 1250 MG: 10 INJECTION, POWDER, LYOPHILIZED, FOR SOLUTION INTRAVENOUS at 00:00

## 2022-05-02 RX ADMIN — DABIGATRAN ETEXILATE MESYLATE 150 MG: 150 CAPSULE ORAL at 08:44

## 2022-05-02 RX ADMIN — TERAZOSIN HYDROCHLORIDE 4 MG: 1 CAPSULE ORAL at 08:44

## 2022-05-02 RX ADMIN — CARVEDILOL 50 MG: 25 TABLET, FILM COATED ORAL at 08:44

## 2022-05-02 RX ADMIN — VANCOMYCIN HYDROCHLORIDE 1250 MG: 10 INJECTION, POWDER, LYOPHILIZED, FOR SOLUTION INTRAVENOUS at 11:27

## 2022-05-02 RX ADMIN — RAMIPRIL 20 MG: 10 CAPSULE ORAL at 08:44

## 2022-05-02 RX ADMIN — ACETAMINOPHEN 650 MG: 325 TABLET ORAL at 00:08

## 2022-05-02 NOTE — ASSESSMENT & PLAN NOTE
· Noted histroy s/p ablation in 2017 with return of a fib and multiple cardioversions in the past   · Follows with Dr Josefa Serrano of EP at [facility]  Recently he has been in NSR since cardioversion after suffering covid in early January of this year  · Noted to have intermittent RVR on 04/30 which broke and currently having fast heart rates again   · Currently on telemetry     · Continue Pradaxa  · Coreg 50 mg BID for rate control   · Cardizem 120 mg extended release daily  · Rates improved   · Recommend outpatient follow-up with EP

## 2022-05-02 NOTE — ASSESSMENT & PLAN NOTE
· Left foot infected post op hematoma  · S/p left 5th metatarsal resection (DOS: 4/8/22)  · S/p washout 4/28 repeat washout  · S/p delayed primary closure of the left foot on 05/01  · On IV Vancomycin given operative cultures positive for MRSA   · Day 6  · Plan for po bactrim on discharge for total 7 days of treatment

## 2022-05-02 NOTE — DISCHARGE SUMMARY
Discharge Summary - [facility], [place] [facility] Internal Medicine    Patient Information: Pako Jaramillo 46 y o  male MRN: [id number]  Unit/Bed#: [facility] 410-01 Encounter: [id number]    Discharging Physician / Practitioner: Alexandria Salgado DO  PCP: Anable Dennison MD  Admission Date: 4/27/2022  Discharge Date: 05/02/22    Disposition:     Home    Reason for Admission:  Left foot infection and postop hematoma    Discharge Diagnoses:     Principal Problem:    Infected hematoma  Active Problems:    HTN (hypertension)    Atrial fibrillation with RVR ([facility] )    Diabetes mellitus, type 2 ([place] [address] )    Stage 3 chronic kidney disease ([place] [address] )  Resolved Problems:    * No resolved hospital problems  *    Stage 3 chronic kidney disease [facility])  Assessment & Plan  Lab Results   Component Value Date    EGFR 61 05/02/2022    EGFR 49 05/01/2022    EGFR 57 04/30/2022    CREATININE 1 32 (H) 05/02/2022    CREATININE 1 56 (H) 05/01/2022    CREATININE 1 38 (H) 04/30/2022     · Creatinine 1 32 today  · Baseline appears to be around 1 4   · Continue to monitor renal function     Diabetes mellitus, type 2 [facility])  Assessment & Plan  Lab Results   Component Value Date    HGBA1C 13 3 (H) 04/06/2021       Recent Labs     05/01/22  1604 05/01/22  2050 05/02/22  0551 05/02/22  1109   POCGLU 158* 138 100 117       Blood Sugar Average: Last 72 hrs:  (P) 131 [id number]   · Home insulin 65 AM 90 PM lantus with sliding scale   · Currently on 60 units AM and changed to 90 units pm per patient request    · Continue SSI   · Adjust as needed     Atrial fibrillation with RVR ([facility] )  Assessment & Plan  · Noted histroy s/p ablation in 2017 with return of a fib and multiple cardioversions in the past   · Follows with Dr Rosalio Wyatt of EP at [facility]  Recently he has been in NSR since cardioversion after suffering covid in early January of this year  · Noted to have intermittent RVR on 04/30 which broke and currently having fast heart rates again   · Currently on telemetry     · Continue Pradaxa  · Coreg 50 mg BID for rate control   · Cardizem 120 mg extended release daily  · Rates improved   · Recommend outpatient follow-up with EP    HTN (hypertension)  Assessment & Plan  · Reviewed and stable   · Amlodipine discontinued by Cardiology  · Continue chlorthalidone, Cardizem, ramipril, Coreg and terazosin    * Infected hematoma  Assessment & Plan  · Left foot infected post op hematoma  · S/p left 5th metatarsal resection (DOS: 4/8/22)  · S/p washout 4/28 repeat washout  · S/p delayed primary closure of the left foot on 05/01  · On IV Vancomycin given operative cultures positive for MRSA   · Day 6  · Plan for po bactrim on discharge for total 7 days of treatment       Consultations During Hospital Stay:  · Podiatry  · Cardiology  · PT/OT    Procedures Performed:     · 4/28:  Left foot washout with resection of the 5th metatarsal  · 5/1:  Delayed primary closure of the left foot    Significant Findings / Test Results:     · 4/27 Left foot x-ray:  Amputation distal 5th metatarsal were findings suspicious for ongoing osteomyelitis at the postoperative margin  · 4/28 left foot x-ray:  Postoperative changes 5th digit  · Atrial flutter with RVR on telemetry    Incidental Findings:   · See above     Test Results Pending at Discharge (will require follow up): · Anaerobic and Gram stain wound culture     Outpatient Tests Requested:  · None    Complications:  None    Hospital Course:     Michael Zaman is a 46 y o  male patient who originally presented to the hospital on 4/27/2022 due to infected left foot hematoma after recent left 5th metatarsal resection on 04/08  Patient followed up outpatient postop with Dr Cecilio Luna and was found to have infected hematoma and advised to come to the hospital for surgical intervention  Wound cultures positive for MRSA  Patient was started on IV vancomycin while inpatient  Patient underwent left foot I&D with VAC application with operative findings of infected hematoma  During hospitalization, patient was noted to have atrial flutter with persistently elevated heart rates in the 120s for which Cardiology was consulted  Patient was initiated on Cardizem and continued on his home Pradaxa  Patient then underwent delayed primary closure of the left foot on 05/01 with removal of VAC  Cardiology at this time reports patient is stable for discharge with outpatient follow-up with EP  Patient was discharged on Cardizem 120 mg extended release once daily  Podiatry evaluated patient prior to discharge and recommend outpatient follow-up and non weight-bearing status in the left lower extremity  Patient was also discharged with p o  Bactrim 7 day course at time of discharge  Recommend outpatient follow-up with PCP, Cardiology and Wound Care after discharge  Condition at Discharge: stable     Discharge Day Visit / Exam:     Subjective:  Patient assessed at bedside  Reports 0/10 pain in the left lower extremity  No other acute complaints at this time  Denies any chest pain, shortness of breath or palpitations  Vitals: Blood Pressure: 130/84 (05/02/22 1100)  Pulse: 94 (05/02/22 1100)  Temperature: 98 °F (36 7 °C) (05/02/22 1100)  Temp Source: Oral (05/02/22 1100)  Respirations: 14 (05/02/22 0655)  Height: 6' (182 9 cm) (04/28/22 0100)  Weight - Scale: 120 kg (264 lb 8 8 oz) (04/28/22 0100)  SpO2: 99 % (05/02/22 1100)  Exam:   Physical Exam  Vitals reviewed  Constitutional:       General: He is not in acute distress  Appearance: Normal appearance  He is not ill-appearing  HENT:      Head: Normocephalic and atraumatic  Eyes:      General: No scleral icterus  Conjunctiva/sclera: Conjunctivae normal    Cardiovascular:      Rate and Rhythm: Normal rate  Rhythm irregular  Heart sounds: Normal heart sounds  No murmur heard  Pulmonary:      Effort: Pulmonary effort is normal  No respiratory distress  Breath sounds: Normal breath sounds  No wheezing  Abdominal:      General: Bowel sounds are normal       Palpations: Abdomen is soft  Tenderness: There is no abdominal tenderness  Musculoskeletal:         General: Normal range of motion  Cervical back: Normal range of motion  Right lower leg: No edema  Left lower leg: No edema  Skin:     General: Skin is warm  Neurological:      Mental Status: He is alert and oriented to person, place, and time  Motor: No weakness  Psychiatric:         Mood and Affect: Mood normal          Behavior: Behavior normal          Discussion with Family: Updated wife at bedside    Discharge instructions/Information to patient and family:   See after visit summary for information provided to patient and family  Provisions for Follow-Up Care:  See after visit summary for information related to follow-up care and any pertinent home health orders  Planned Readmission: None     Discharge Statement:  I spent 45 minutes discharging the patient  This time was spent on the day of discharge  I had direct contact with the patient on the day of discharge  Greater than 50% of the total time was spent examining patient, answering all patient questions, arranging and discussing plan of care with patient as well as directly providing post-discharge instructions  Additional time then spent on discharge activities  Discharge Medications:  See after visit summary for reconciled discharge medications provided to patient and family        ** Please Note: This note has been constructed using a voice recognition system **

## 2022-05-02 NOTE — ASSESSMENT & PLAN NOTE
Lab Results   Component Value Date    HGBA1C 13 3 (H) 04/06/2021       Recent Labs     05/01/22  1604 05/01/22 2050 05/02/22  0551 05/02/22  1109   POCGLU 158* 138 100 117       Blood Sugar Average: Last 72 hrs:  (P) 131 [id number]   · Home insulin 65 AM 90 PM lantus with sliding scale   · Currently on 60 units AM and changed to 90 units pm per patient request    · Continue SSI   · Adjust as needed

## 2022-05-02 NOTE — PROGRESS NOTES
Progress Note - Cardiology   Rhonda Jones 46 y o  male MRN: [id number]  Unit/Bed#: [facility] 410-01 Encounter: [id number]    Assessment/Plan:    Paroxysmal atrial fibrillation/flutter: s/p PVI isolation 2017, B) s/p cardioversion 1/2022 now back in a fib most likely infection related and now rate controlled in the 80's on rate control medical therapy   -Pradaxa 150 mg q12h   -dilt 120 mg daily and coreg 50 mg BID for rate control   -patient planning to discuss rhythm control options further with his outpatient electrophysiologist cardiologist  -can be discharged from a cardiology perspective      Subjective/Objective   No events overnight  Currently Denies fevers/chills, nausea/vomiting, abdominal pain, diarrhea, cough, chest pain, shortness of breath, PND, orthopnea, presyncopal symptoms, syncopal episodes  Tele: fib and flutter rate controlled in the 80's        Vitals: /84 (BP Location: Right arm)   Pulse 94   Temp 98 °F (36 7 °C) (Oral)   Resp 14   Ht 6' (1 829 m)   Wt 120 kg (264 lb 8 8 oz)   SpO2 99%   BMI 35 88 kg/m²   Vitals:    04/28/22 0100   Weight: 120 kg (264 lb 8 8 oz)     Orthostatic Blood Pressures      Most Recent Value   Blood Pressure 130/84 filed at 05/02/2022 1100   Patient Position - Orthostatic VS Sitting filed at 05/02/2022 1100            Intake/Output Summary (Last 24 hours) at 5/2/2022 1248  Last data filed at 5/2/2022 1217  Gross per 24 hour   Intake 1680 ml   Output 850 ml   Net 830 ml       Invasive Devices  Report    Peripheral Intravenous Line            Peripheral IV 05/01/22 Left Forearm 1 day                Review of Systems: Negative     Physical Exam: GEN: Rhonda Jones appears well, alert and oriented x 3, pleasant and cooperative   HEENT: pupils equal, round, and reactive to light; extraocular muscles intact  NECK: supple, no carotid bruits   HEART:  Irregularly irregular, variable S1/S2, no murmur  LUNGS: clear to auscultation bilaterally; no wheezes, rales, or rhonchi   ABDOMEN: normal bowel sounds, soft, no tenderness, no distention  EXTREMITIES:  Left foot with dressing intact,NEURO: no focal findings   SKIN: normal without suspicious lesions on exposed skin    Lab Results: I have personally reviewed pertinent lab results  Imaging: I have personally reviewed pertinent reports  EKG:   VTE Pharmacologic Prophylaxis: Pradaxa  VTE Mechanical Prophylaxis: sequential compression device    Counseling / Coordination of Care  Total Critical Care time spent 30 minutes excluding procedures, teaching and family updates

## 2022-05-02 NOTE — ASSESSMENT & PLAN NOTE
· Reviewed and stable   · Amlodipine discontinued by Cardiology  · Continue chlorthalidone, Cardizem, ramipril, Coreg and terazosin

## 2022-05-02 NOTE — DISCHARGE INSTRUCTIONS
Discharge Instructions - Podiatry    Weight Bearing Status: Strict nonweightbearing to left foot                       Follow-up appointment instructions: Please make an appointment within one week of discharge with Dr Kaitlin Powers  Contact sooner if any increase in pain, or signs of infection occur    Patient Wound Care Instructions: Leave dressings clean, dry, and intact until 1st outpatient office visit       Please take your outpatient bactrim as prescribed

## 2022-05-02 NOTE — PHYSICAL THERAPY NOTE
Physical Therapy Screen    Patient Name: Concepción Castorena    UHDIANAZAnthonyU Date: 5/2/2022     Problem List  Principal Problem:    Pre-op evaluation  Active Problems:    HTN (hypertension)    Atrial fibrillation with RVR ([place] [address] )    Diabetes mellitus, type 2 ([place] [address] )    Infected hematoma    Stage 3 chronic kidney disease ([place] [address] )       Past Medical History  Past Medical History:   Diagnosis Date    A-fib ([place] [address] )     BPH (benign prostatic hyperplasia)     CPAP (continuous positive airway pressure) dependence     Diabetes mellitus ([place] [address] )     GERD (gastroesophageal reflux disease)     History of COVID-19 01/2022    mild s/s but went into a fib    Hyperlipidemia     Hypertension     MRSA infection 2021    back cyst    Sleep apnea     Tailor's bunion of left foot         Past Surgical History  Past Surgical History:   Procedure Laterality Date    CARDIAC CATHETERIZATION  2010    [facility]    CARDIAC ELECTROPHYSIOLOGY STUDY AND ABLATION      CARDIOVERSION      CATARACT EXTRACTION Bilateral 07/2021    CLOSURE DELAYED PRIMARY Left 5/1/2022    Procedure: CLOSURE DELAYED PRIMARY VAC REMOVED;  Surgeon: Reinier Myrick DPM;  Location: [facility];  Service: Podiatry    COLONOSCOPY      INCISION AND DRAINAGE OF WOUND Left 4/28/2022    Procedure: foot I&D with vac application; 5TH metatarsal bone biopsy;  Surgeon: Reinier Myrick DPM;  [facility];  Service: Podiatry    NOSE SURGERY      TOE OSTEOTOMY Left 4/8/2022    Procedure: RESECTION LEFT 5TH METATARSAL;  Surgeon: Reinier Myrick DPM;  Location: [facility];  Service: Podiatry        05/02/22 0844   Note Type   Note type Screen   Additional Comments PT order received; chart reviewed and spoke to the pt; pt informs there is no change in his mobility status after the surgery, denies any mobility issues at this time while NWB and using a scooter and otherwise expresses no concerns about his mobility skills at this time; based on above, will D/C from PT services; pt concurred       [facility], PT

## 2022-05-02 NOTE — OCCUPATIONAL THERAPY NOTE
Occupational Therapy CX        Patient Name: Jazmyn Jordan  PNFXK'W Date: 5/2/2022 05/02/22 0957   OT Last Visit   OT Visit Date 05/02/22   Note Type   Note type Screen   Additional Comments Chart reviewed  Pt evaluated prior to vac removal  Pt reports no changes in funcitonal mobility/ADLs since surgery   No further acute OT needs, d/c OT     Sharon Mccall, MS, OTR/L

## 2022-05-02 NOTE — PROGRESS NOTES
Vancomycin IV Pharmacy-to-Dose Consultation    Deni House is a 46 y o  male who is currently receiving Vancomycin IV with management by the Pharmacy Consult service for the treatment of skin-soft tissue infection cellulitis  Assessment/Plan:    The patient's chart was reviewed  Renal function is stable  There are no signs or symptoms of nephrotoxicity and/or infusion reactions documented  The following nephrotoxicity factors are present:  Medications: Zosyn, IV acyclovir, tenofovir, high-dose Bactrim, Aminoglycosides, amphotericin B, colistin/polymyxin B, vasopressors, diuretics, NSAIDs, ACEIs/ARBs, IV contrast, cyclosporine, tacrolimus, cisplatin, lithium, etc   Patient-Factors: elderly, dehydration, hypotension, renal dysfunction, blood loss (surgery)    Based on today's assessment, will continue current vancomycin (Day # 6) dosing of 1250 mg q12h, with a plan for trough to be drawn at 1030 on Fri (5/6)  Cr was 1 56 yesterday but now decrease to 1 32 which is steady for pt  We will continue to follow the patient's culture results and clinical progress daily  Mitchell ALEXANDER Ph , Pharmacist, [phone]

## 2022-05-02 NOTE — PLAN OF CARE
Problem: INFECTION - ADULT  Goal: Absence or prevention of progression during hospitalization  Description: INTERVENTIONS:  - Assess and monitor for signs and symptoms of infection  - Monitor lab/diagnostic results  - Monitor all insertion sites, i e  indwelling lines, tubes, and drains  - Monitor endotracheal if appropriate and nasal secretions for changes in amount and color  - [place] appropriate cooling/warming therapies per order  - Administer medications as ordered  - Instruct and encourage patient and family to use good hand hygiene technique  - Identify and instruct in appropriate isolation precautions for identified infection/condition  Outcome: Progressing  Goal: Absence of fever/infection during neutropenic period  Description: INTERVENTIONS:  - Monitor WBC    Outcome: Progressing

## 2022-05-02 NOTE — ASSESSMENT & PLAN NOTE
Lab Results   Component Value Date    EGFR 61 05/02/2022    EGFR 49 05/01/2022    EGFR 57 04/30/2022    CREATININE 1 32 (H) 05/02/2022    CREATININE 1 56 (H) 05/01/2022    CREATININE 1 38 (H) 04/30/2022     · Creatinine 1 32 today  · Baseline appears to be around 1 4   · Continue to monitor renal function

## 2022-05-02 NOTE — PROGRESS NOTES
Podiatry - Progress Note  Patient: Franklin Weber 46 y o  male   MRN: [id number]  PCP: Maicol Pierson MD  Unit/Bed#: [facility] 410-01 Encounter: [id number]  Date Of Visit: 22    ASSESSMENT:    Franklin Weber is a 46 y o  male with:    1  Left foot infected post op hematoma            -S/p left foot washout with resection of 5th metatarsal (DOS 22)       -S/p delayed primary closure (DOS 2022)  2  S/p left 5th metatarsal resection (DOS: 22)  3  T2DM  4  A-fib  5  HTN    PLAN:    · Incision site appears well coapted with skin edges well-approximated  Mild bloody drainage noted with no yajaira of incision site dehiscence or maceration  No local signs of infection noted  · Patient stable for discharge from a podiatry standpoint  Will likely be discharged to home today with 7-day course of PO bactrim  · Elevation and offloading on green foam wedges or pillows when non-ambulatory  · Appreciate consulting services for recommendations and management  Antibiotics started: Vancomycin  Pharmacologic VTE Prophylaxis: Pradaxa  Mechanical VTE Prophylaxis: sequential compression device   Weightbearing status: Non-weightbearing to LLE    Disposition:  Patient requires continued stay for above reasons  SUBJECTIVE:     The patient was seen, evaluated, and assessed at bedside today  The patient was awake, alert, and in no acute distress  No acute events overnight  The patient reports no pain to his LLE  Patient denies N/V/F/chills/SOB/CP  OBJECTIVE:     Vitals:   /76   Pulse 69   Temp 98 6 °F (37 °C)   Resp 14   Ht 6' (1 829 m)   Wt 120 kg (264 lb 8 8 oz)   SpO2 98%   BMI 35 88 kg/m²     Temp (24hrs), Av 9 °F (36 6 °C), Min:97 3 °F (36 3 °C), Max:98 7 °F (37 1 °C)      Physical Exam:     General: Alert, cooperative and no distress  Lungs: Non labored breathing  Abdomen: Soft, non-tender  Lower extremity exam:  Cardiovascular status at baseline  Neurological status at baseline  Musculoskeletal status at baseline  No calf tenderness noted  Incision site appears well coapted with skin edges well-approximated  Mild bloody drainage noted with no signs of incision site dehiscence or maceration  No local signs of infection noted  Clinical Images 05/02/22:          Additional Data:     Labs:    Results from last 7 days   Lab Units 05/01/22  0448 04/30/22  0435 04/30/22  0435   WBC Thousand/uL 6 33   < > 7 20   HEMOGLOBIN g/dL 13 0   < > 13 7   HEMATOCRIT % 38 0   < > 40 0   PLATELETS Thousands/uL 209   < > 185   NEUTROS PCT %  --   --  69   LYMPHS PCT %  --   --  21   MONOS PCT %  --   --  8   EOS PCT %  --   --  2    < > = values in this interval not displayed  Results from last 7 days   Lab Units 05/02/22  0517 04/28/22  0734 04/27/22  2232   POTASSIUM mmol/L 3 6   < > 3 6   CHLORIDE mmol/L 105   < > 105   CO2 mmol/L 30   < > 32   BUN mg/dL 31*   < > 50*   CREATININE mg/dL 1 32*   < > 1 56*   CALCIUM mg/dL 8 7   < > 9 3   ALK PHOS U/L  --   --  108   ALT U/L  --   --  33   AST U/L  --   --  26    < > = values in this interval not displayed  * I Have Reviewed All Lab Data Listed Above  Recent Cultures (last 7 days):     Results from last 7 days   Lab Units 05/01/22  0848   GRAM STAIN RESULT  No Polys or Bacteria seen     Results from last 7 days   Lab Units 05/01/22  0848   ANAEROBIC CULTURE  Culture results to follow  Imaging: I have personally reviewed pertinent films in PACS  EKG, Pathology, and Other Studies: I have personally reviewed pertinent reports  ** Please Note: Portions of the record may have been created with voice recognition software  Occasional wrong word or "sound a like" substitutions may have occurred due to the inherent limitations of voice recognition software  Read the chart carefully and recognize, using context, where substitutions have occurred   **

## 2022-05-03 LAB
BACTERIA SPEC ANAEROBE CULT: NORMAL
BACTERIA TISS AEROBE CULT: ABNORMAL
GRAM STN SPEC: ABNORMAL

## 2022-05-03 NOTE — UTILIZATION REVIEW
Notification of Discharge   This is a Notification of Discharge from our facility [address]  Please be advised that this patient has been discharge from our facility  Below you will find the admission and discharge date and time including the patients disposition  UTILIZATION REVIEW CONTACT:  Junie Lindsey  Utilization   Network Utilization Review Department  Phone: [phone] x carefully listen to the prompts  All voicemails are confidential   Email: [email or web address]     PHYSICIAN ADVISORY SERVICES:  FOR [id number] REVIEW - MEDICAL NECESSITY DENIAL  Phone: [phone]  Fax: [phone]  Email: [email or web address]     PRESENTATION DATE: 4/27/2022  7:14 PM  OBERVATION ADMISSION DATE:   INPATIENT ADMISSION DATE: 4/27/22  7:14 PM   DISCHARGE DATE: 5/2/2022  3:17 PM  DISPOSITION: Home with [facility] with [address] [place] [address] Road INFORMATION:  Send all requests for admission clinical reviews, approved or denied determinations and any other requests to dedicated fax number below belonging to the campus where the patient is receiving treatment   List of dedicated fax numbers:  [address] DENIALS (Administrative/Medical Necessity) [phone]   [address] (Maternity/NICU/Pediatrics) [phone]   [facility] [phone]   130 [facility] [phone]   [address] [place] [address] [phone]   [address] [phone]   [facility]  [phone]   [address] [place] [address], [address]  [address] [facility] 1000 [address] [facility] [phone]

## 2022-05-10 ENCOUNTER — OFFICE VISIT (OUTPATIENT)
Dept: PODIATRY | Facility: CLINIC | Age: 52
End: 2022-05-10

## 2022-05-10 VITALS
HEIGHT: 72 IN | BODY MASS INDEX: 35.88 KG/M2 | SYSTOLIC BLOOD PRESSURE: 127 MMHG | HEART RATE: 80 BPM | DIASTOLIC BLOOD PRESSURE: 83 MMHG

## 2022-05-10 DIAGNOSIS — L08.9 INFECTED HEMATOMA: Primary | ICD-10-CM

## 2022-05-10 DIAGNOSIS — Z98.890 POST-OPERATIVE STATE: ICD-10-CM

## 2022-05-10 DIAGNOSIS — T14.8XXA INFECTED HEMATOMA: Primary | ICD-10-CM

## 2022-05-10 PROCEDURE — 99024 POSTOP FOLLOW-UP VISIT: CPT | Performed by: PODIATRIST

## 2022-05-10 NOTE — PROGRESS NOTES
PATIENT:  Concepción Castorena      1970      ASSESSMENT     1  Infected hematoma     2  Post-operative state           PLAN  Patient is doing well post-operatively  Sutures left intact  Incision was cleaned with betadine and silver alginate and DSD applied to be kept C/D/I  Continue post-op care as instructed  Reviewed pathology / culture result  There is no signs of infection or hematoma  Finish oral antibiotics and will monitor the progress  Consider further images if there is any recurring signs of infection in the future  Call if any increase in pain, fevers, calf pain, shortness of breath, or general distress is noted  Patient instructed to go to ER if call is not returned immediately  RA in 1 week  HISTORY OF PRESENT ILLNESS  Patient presents for post-op appointment  S/P further resection of 5th metatarsal with delayed closure and antibiotics beads  No pain  He feels well  Post-op pain is under control and resolving well  The patient is feeling well and in good spirits  Patient reported no post-op concern  REVIEW OF SYSTEMS  GENERAL: No fever or chills  HEART: No chest pain, or palpitation  RESPIRATORY:  No SOB or cough  GI: No Nausea, vomit or diarrhea  NEUROLOGIC: No syncope or acute weakness  MUSCULOSKELETAL: No calf pain or edema  PHYSICAL EXAMINATION  GENERAL  The patient appears in NAD / non-toxic  Afebrile  VSS    VASCULAR EXAM  Pedal pulses and vascular status are intact  No calf pain or edema bilaterally  No cyanosis  DERMATOLOGIC EXAM  Dressing clean  Expected amount of drainage on the inner dressing due to antibiotic beads  Incision is coapted  No signs of infection  No active drainage  Slight post-op edema and ecchymosis  No necrosis  No warmth  NEUROLOGIC EXAM  AAO X 3  No focal neurologic deficit  Neurologic status is intact BLE  MUSCULOSKELETAL EXAM  Normal post-op findings  ROM intact  No fluctuation or crepitus

## 2022-05-17 ENCOUNTER — OFFICE VISIT (OUTPATIENT)
Dept: PODIATRY | Facility: CLINIC | Age: 52
End: 2022-05-17

## 2022-05-17 VITALS — WEIGHT: 260 LBS | HEIGHT: 72 IN | BODY MASS INDEX: 35.21 KG/M2

## 2022-05-17 DIAGNOSIS — L08.9 INFECTED HEMATOMA: Primary | ICD-10-CM

## 2022-05-17 DIAGNOSIS — Z98.890 POST-OPERATIVE STATE: ICD-10-CM

## 2022-05-17 DIAGNOSIS — T14.8XXA INFECTED HEMATOMA: Primary | ICD-10-CM

## 2022-05-17 PROCEDURE — 99024 POSTOP FOLLOW-UP VISIT: CPT | Performed by: PODIATRIST

## 2022-05-17 NOTE — PROGRESS NOTES
PATIENT:  Kristal Whatley      1970      ASSESSMENT     1  Infected hematoma     2  Post-operative state           PLAN  Patient is doing well post-operatively  Sutures left intact  Incision was cleaned with betadine and silver alginate and DSD  Change the dressing every 2 days  Continue post-op care as instructed  Call if any increase in pain, fevers, calf pain, shortness of breath, or general distress is noted  Patient instructed to go to ER if call is not returned immediately  RA in 1 week  HISTORY OF PRESENT ILLNESS  Patient presents for post-op appointment  S/P further resection of 5th metatarsal with delayed closure and antibiotics beads  No pain  He feels well  The patient is feeling well and in good spirits  Patient reported no post-op concern  REVIEW OF SYSTEMS  GENERAL: No fever or chills  HEART: No chest pain, or palpitation  RESPIRATORY:  No SOB or cough  GI: No Nausea, vomit or diarrhea  NEUROLOGIC: No syncope or acute weakness  MUSCULOSKELETAL: No calf pain or edema  PHYSICAL EXAMINATION  GENERAL  The patient appears in NAD / non-toxic  Afebrile  VSS    VASCULAR EXAM  Pedal pulses and vascular status are intact  No calf pain or edema bilaterally  No cyanosis  DERMATOLOGIC EXAM  Dressing clean  Decreased drainage on the inner dressing  Incision is coapted  No signs of infection  Minimal edema and ecchymosis  No necrosis  No warmth  NEUROLOGIC EXAM  AAO X 3  No focal neurologic deficit  Neurologic status is intact BLE  MUSCULOSKELETAL EXAM  Normal post-op findings  ROM intact  No fluctuation or crepitus

## 2022-05-23 ENCOUNTER — APPOINTMENT (OUTPATIENT)
Dept: RADIOLOGY | Facility: CLINIC | Age: 52
End: 2022-05-23
Payer: COMMERCIAL

## 2022-05-23 ENCOUNTER — TELEPHONE (OUTPATIENT)
Dept: PODIATRY | Facility: CLINIC | Age: 52
End: 2022-05-23

## 2022-05-23 ENCOUNTER — OFFICE VISIT (OUTPATIENT)
Dept: PODIATRY | Facility: CLINIC | Age: 52
End: 2022-05-23

## 2022-05-23 VITALS
BODY MASS INDEX: 35.21 KG/M2 | DIASTOLIC BLOOD PRESSURE: 107 MMHG | HEART RATE: 76 BPM | HEIGHT: 72 IN | WEIGHT: 260 LBS | SYSTOLIC BLOOD PRESSURE: 152 MMHG

## 2022-05-23 DIAGNOSIS — T81.31XA DISRUPTION OF EXTERNAL SURGICAL WOUND, INITIAL ENCOUNTER: ICD-10-CM

## 2022-05-23 DIAGNOSIS — Z98.890 POST-OPERATIVE STATE: ICD-10-CM

## 2022-05-23 DIAGNOSIS — M96.840 POSTOPERATIVE HEMATOMA OF MUSCULOSKELETAL STRUCTURE FOLLOWING MUSCULOSKELETAL PROCEDURE: Primary | ICD-10-CM

## 2022-05-23 PROCEDURE — 99024 POSTOP FOLLOW-UP VISIT: CPT | Performed by: PODIATRIST

## 2022-05-23 PROCEDURE — 73630 X-RAY EXAM OF FOOT: CPT

## 2022-05-23 NOTE — PROGRESS NOTES
PATIENT:  Shiloh Alegria      1970      ASSESSMENT     1  Postoperative hematoma of musculoskeletal structure following musculoskeletal procedure     2  Disruption of external surgical wound, initial encounter     3  Post-operative state  XR foot 3+ vw left         PLAN  There is no signs of infection  No significant redness or warmth  Sutures removed  Slight rash around the central part of incision from drainage since wound dehisced  Incision dehisced without deep tissue exposure  Steri strips applied  Continue local care  Change the dressing daily  X-ray left foot was obtained and reviewed with him  Possible mild remodeling at the stump and could be residual antibiotic beads that is almost resolved  There is no signs of infection and continue to monitor with local care  Discussed possible need for revision depending on the progress  Resting and elevation left foot  Call if any increase in pain, fevers, calf pain, shortness of breath, or general distress is noted  Patient instructed to go to ER if call is not returned immediately  RA in 1 week  HISTORY OF PRESENT ILLNESS  Patient presents for post-op appointment  S/P further resection of 5th metatarsal with delayed closure and antibiotics beads  No pain  He feels well  His wife thought there was little redness around the incision today and called the office earlier  REVIEW OF SYSTEMS  GENERAL: No fever or chills  HEART: No chest pain, or palpitation  RESPIRATORY:  No SOB or cough  GI: No Nausea, vomit or diarrhea  NEUROLOGIC: No syncope or acute weakness  MUSCULOSKELETAL: No calf pain or edema  PHYSICAL EXAMINATION  GENERAL  The patient appears in NAD / non-toxic  Afebrile  VSS    VASCULAR EXAM  Pedal pulses and vascular status are intact  No calf pain or edema bilaterally  No cyanosis  DERMATOLOGIC EXAM  Incision is dehisced  No exposure of deep tissues such as tendon or bone  No deep probing    Minimal redness or edema  No warmth  No purulence  Wound bed is granular  No signs of abscess  No necrosis  NEUROLOGIC EXAM  AAO X 3  No focal neurologic deficit  Neurologic status is intact BLE  MUSCULOSKELETAL EXAM  ROM intact  No fluctuation or crepitus

## 2022-05-23 NOTE — TELEPHONE ENCOUNTER
Patient called in to office stating that when he was changing his bandage over the weekend, he noticed that his incision was a little red with the normal amount of drainage  He was told to call the office to notify of any changes and the redness at his incision is new  Next post op appt - 5/24/22    Please reach out to patient

## 2022-05-24 ENCOUNTER — TELEPHONE (OUTPATIENT)
Dept: PODIATRY | Facility: CLINIC | Age: 52
End: 2022-05-24

## 2022-05-24 NOTE — TELEPHONE ENCOUNTER
Ed called, to say that the letter he requested can be faxed to [phone]  They need it sometime today

## 2022-05-24 NOTE — TELEPHONE ENCOUNTER
This patient called  He is asking for an extension for his disability leave  Can he have an extra month? Can this be emailed to:    [email or web address]    Thank you

## 2022-05-24 NOTE — TELEPHONE ENCOUNTER
Patient sees Dr Ros Bhatt  Patient is calling back in wanting to know if he is able to get an extension on his disability leave for another month  He is asking once this is completed and what the Dr decides if we can reach out to him to let him aware  The patient is wanting to stop into the office to obtain the updated copy  Please advise             Call back# [phone]

## 2022-05-31 ENCOUNTER — OFFICE VISIT (OUTPATIENT)
Dept: PODIATRY | Facility: CLINIC | Age: 52
End: 2022-05-31

## 2022-05-31 VITALS
HEIGHT: 72 IN | DIASTOLIC BLOOD PRESSURE: 108 MMHG | SYSTOLIC BLOOD PRESSURE: 149 MMHG | HEART RATE: 99 BPM | BODY MASS INDEX: 35.21 KG/M2 | WEIGHT: 260 LBS

## 2022-05-31 DIAGNOSIS — T81.31XA DISRUPTION OF EXTERNAL SURGICAL WOUND, INITIAL ENCOUNTER: Primary | ICD-10-CM

## 2022-05-31 PROCEDURE — 99024 POSTOP FOLLOW-UP VISIT: CPT | Performed by: PODIATRIST

## 2022-05-31 NOTE — PROGRESS NOTES
PATIENT:  Ashley Melgar      1970      ASSESSMENT     1  Disruption of external surgical wound, initial encounter           PLAN  Wound is matt well with decreased depth  There is no signs of infection  Continue local care and off -loading  Recommended NWB using scooter  Resting and elevation left foot  Call if any increase in pain, fevers, calf pain, shortness of breath, or general distress is noted  Patient instructed to go to ER if call is not returned immediately  RA in 1 week  HISTORY OF PRESENT ILLNESS  Patient presents for post-op appointment  Decreased drainage  No edema, redness, or pain  No new complaint  REVIEW OF SYSTEMS  GENERAL: No fever or chills  HEART: No chest pain, or palpitation  RESPIRATORY:  No SOB or cough  GI: No Nausea, vomit or diarrhea  NEUROLOGIC: No syncope or acute weakness  MUSCULOSKELETAL: No calf pain or edema  PHYSICAL EXAMINATION  GENERAL  The patient appears in NAD / non-toxic  Afebrile  VSS    VASCULAR EXAM  Pedal pulses and vascular status are intact  No calf pain or edema bilaterally  No cyanosis  DERMATOLOGIC EXAM  Wound is stable  Decrease in depth  Wound bed is granular  No exposure of deep tissues  No deep probing  No redness or edema  No warmth  No purulence  No signs of abscess  No necrosis  NEUROLOGIC EXAM  AAO X 3  No focal neurologic deficit  Neurologic status is intact BLE  MUSCULOSKELETAL EXAM  ROM intact  No fluctuation or crepitus

## 2022-06-07 ENCOUNTER — OFFICE VISIT (OUTPATIENT)
Dept: PODIATRY | Facility: CLINIC | Age: 52
End: 2022-06-07

## 2022-06-07 VITALS
HEIGHT: 72 IN | HEART RATE: 52 BPM | DIASTOLIC BLOOD PRESSURE: 94 MMHG | BODY MASS INDEX: 35.21 KG/M2 | SYSTOLIC BLOOD PRESSURE: 146 MMHG | WEIGHT: 260 LBS

## 2022-06-07 DIAGNOSIS — T81.31XA DISRUPTION OF EXTERNAL SURGICAL WOUND, INITIAL ENCOUNTER: Primary | ICD-10-CM

## 2022-06-07 PROCEDURE — 99024 POSTOP FOLLOW-UP VISIT: CPT | Performed by: PODIATRIST

## 2022-06-15 ENCOUNTER — TELEPHONE (OUTPATIENT)
Dept: OBGYN CLINIC | Facility: HOSPITAL | Age: 52
End: 2022-06-15

## 2022-06-15 ENCOUNTER — OFFICE VISIT (OUTPATIENT)
Dept: PODIATRY | Facility: CLINIC | Age: 52
End: 2022-06-15
Payer: COMMERCIAL

## 2022-06-15 VITALS
BODY MASS INDEX: 35.67 KG/M2 | WEIGHT: 263 LBS | HEART RATE: 55 BPM | SYSTOLIC BLOOD PRESSURE: 170 MMHG | DIASTOLIC BLOOD PRESSURE: 94 MMHG

## 2022-06-15 DIAGNOSIS — E11.21 TYPE 2 DIABETES MELLITUS WITH DIABETIC NEPHROPATHY, WITHOUT LONG-TERM CURRENT USE OF INSULIN (HCC): ICD-10-CM

## 2022-06-15 DIAGNOSIS — M14.672 CHARCOT'S JOINT OF LEFT FOOT: Primary | ICD-10-CM

## 2022-06-15 PROCEDURE — 99214 OFFICE O/P EST MOD 30 MIN: CPT | Performed by: PODIATRIST

## 2022-06-15 NOTE — TELEPHONE ENCOUNTER
Patient's wife called and LM on surgery line in regards to same  Call place to patient and scheduled for office eval with Dr Jet Lewis today 6/15/22 @ 1500

## 2022-06-15 NOTE — PROGRESS NOTES
This patient was seen on 6/15/22  My role is Foot , Ankle, and Wound Specialist    SUBJECTIVE    Chief Complaint:  New onset edema, erythema Left foot     Patient ID: Joselyn Ribeiro is a 46 y o  male  I am seeing Marcela Shelton today as he has new redness and swelling Left foot  Dr Saritha Saleh is out of town  Ed denies fevers, chills, pain, drainage nor elevated blood glucose  He did recently have foot surgery related to a foot infection  He's currently ambulating in a surgical shoe  The following portions of the patient's history were reviewed and updated as appropriate: allergies, current medications, past family history, past medical history, past social history, past surgical history and problem list     Review of Systems   Constitutional: Negative  Cardiovascular: Positive for leg swelling  Skin: Positive for color change  Neurological: Positive for numbness  OBJECTIVE      /94   Pulse 55   Wt 119 kg (263 lb)   BMI 35 67 kg/m²     Foot/Ankle Musculoskeletal Exam    General      Neurological: alert      General additional comments: I note mild, diffuse erythema throughout the Left foot corresponding with generalized edema  The foot is warm to the touch  Bilateral infrared cutaneous thermistor measurements were obtained after allowing his skin to equilibrate to room temperature air for 15 minutes after cast boot and shoe removal  10 sites were tested on the acute Charcot foot and the warmest site was selected for comparison to the contralateral foot  The differential is noted to be 7 degrees Fahrenheit  Physical Exam  Vitals and nursing note reviewed  Constitutional:       General: He is not in acute distress  Appearance: Normal appearance  He is not ill-appearing, toxic-appearing or diaphoretic  Musculoskeletal:      Comments: I note mild, diffuse erythema throughout the Left foot corresponding with generalized edema  The foot is warm to the touch   Bilateral infrared cutaneous thermistor measurements were obtained after allowing his skin to equilibrate to room temperature air for 15 minutes after cast boot and shoe removal  10 sites were tested on the acute Charcot foot and the warmest site was selected for comparison to the contralateral foot  The differential is noted to be 7 degrees Fahrenheit  Skin:     Comments: His wound on the Left foot remains closed  There is no focal erythema around the wound  Neurological:      Mental Status: He is alert  ASSESSMENT     Diagnoses and all orders for this visit:    Charcot's joint of left foot  -     MRI ankle/heel left wo contrast; Future    Type 2 diabetes mellitus with diabetic nephropathy, without long-term current use of insulin ([facility])         Problem List Items Addressed This Visit        Endocrine    Diabetes mellitus, type 2 ([facility] )      Other Visit Diagnoses     Charcot's joint of left foot    -  Primary    Relevant Orders    MRI ankle/heel left wo contrast              PLAN    I feel his most likely diagnosis is Stage 0 acute Charcot neuroarthropathy and much less likely some type of residual infection  I placed him in a camboot and back on NWB until he can complete an MRI to rule out Charcot  I warned Ed that a false + at the old surgical site is possible and we will have to somewhat expect that as well  He will see Dr Rochelle Beth next week in follow-up

## 2022-06-15 NOTE — TELEPHONE ENCOUNTER
Patient sees Dr Carlos Manuel Martinez the patients spouse is calling in stating that he had surgery on 5/1 to his left foot  She was made aware that the Dr is out of the office this week by the Dr and was told if anything happens to contact the office for him to be seen with another Dr  She stated that he is experiencing some redness last night it has subsisted a little but is still red today and it is warm to the touch all around the foot and also swelling on the top of the foot  She is asking if he is able to be seen with someone else today in the office, please advise if this can be done            Call back# [phone] home phone number

## 2022-06-21 ENCOUNTER — OFFICE VISIT (OUTPATIENT)
Dept: PODIATRY | Facility: CLINIC | Age: 52
End: 2022-06-21
Payer: COMMERCIAL

## 2022-06-21 ENCOUNTER — APPOINTMENT (OUTPATIENT)
Dept: RADIOLOGY | Age: 52
End: 2022-06-21
Payer: COMMERCIAL

## 2022-06-21 VITALS
BODY MASS INDEX: 35.76 KG/M2 | SYSTOLIC BLOOD PRESSURE: 150 MMHG | HEART RATE: 52 BPM | DIASTOLIC BLOOD PRESSURE: 85 MMHG | HEIGHT: 72 IN | WEIGHT: 264 LBS

## 2022-06-21 DIAGNOSIS — M14.672 CHARCOT'S JOINT OF LEFT FOOT: Primary | ICD-10-CM

## 2022-06-21 DIAGNOSIS — M19.90 INFLAMMATORY ARTHROPATHY: ICD-10-CM

## 2022-06-21 DIAGNOSIS — E11.21 TYPE 2 DIABETES MELLITUS WITH DIABETIC NEPHROPATHY, WITHOUT LONG-TERM CURRENT USE OF INSULIN (HCC): ICD-10-CM

## 2022-06-21 DIAGNOSIS — M14.672 CHARCOT'S JOINT OF LEFT FOOT: ICD-10-CM

## 2022-06-21 PROCEDURE — 99213 OFFICE O/P EST LOW 20 MIN: CPT | Performed by: PODIATRIST

## 2022-06-21 PROCEDURE — 73630 X-RAY EXAM OF FOOT: CPT

## 2022-06-21 NOTE — PROGRESS NOTES
PATIENT:  Eduard Zaidi      1970      ASSESSMENT     1  Charcot's joint of left foot  XR foot 3+ vw left   2  Type 2 diabetes mellitus with diabetic nephropathy, without long-term current use of insulin ([facility] [address] )     3  Inflammatory arthropathy  Uric acid         PLAN  X-ray was obtained and reviewed with him and his wife  No definitive evidence of Charcot arthropathy  It is still important to rule out due to he had recent infection and wound  Recommends MRI  He has high risk for gout due to renal insufficiency and diuretics  Check Uric acid to rule out gout  Continue to monitor for any increased pain, redness, or edema  Monitor for any recurring would  Call if any increase in pain, fevers, calf pain, shortness of breath, or general distress is noted  Patient instructed to go to ER if call is not returned immediately  CAM walker and knee scooter for off-loading for now  RA in 2 weeks  HISTORY OF PRESENT ILLNESS  Patient presents for left foot evaluation  He had episode of increased redness, warmth, and swelling to left foot  He saw Dr Heidy Rivas and sent for MRI to rule out Stage 0 Charcot neuroarthropathy of left foot  MRI denied because X-ray was not obtained at that time  He was put on CAM walker and knee scooter  Decreased swelling and redness  No significant pain  Wound remains closed          PAST MEDICAL HISTORY:  Past Medical History:   Diagnosis Date    A-fib [facility])     BPH (benign prostatic hyperplasia)     CPAP (continuous positive airway pressure) dependence     [facility]     GERD (gastroesophageal reflux disease)     History of COVID-19 01/2022    mild s/s but went into a fib    Hyperlipidemia     Hypertension     MRSA infection 2021    back cyst    Sleep apnea     Tailor's bunion of left foot        PAST SURGICAL HISTORY:  Past Surgical History:   Procedure Laterality Date    [address]ATION  [id number]    CARDIAC ELECTROPHYSIOLOGY STUDY AND ABLATION      CARDIOVERSION      CATARACT EXTRACTION Bilateral 07/2021    CLOSURE DELAYED PRIMARY Left 5/1/2022    Procedure: CLOSURE DELAYED PRIMARY VAC REMOVED;  Surgeon: Angelika Jaramillo DPM;  [facility];  Service: Podiatry    COLONOSCOPY      INCISION AND DRAINAGE OF WOUND Left 4/28/2022    Procedure: foot I&D with vac application; 5TH metatarsal bone biopsy;  Surgeon: Angelika Jaramillo DPM;  [facility];  Service: Podiatry    NOSE SURGERY      TOE OSTEOTOMY Left 4/8/2022    Procedure: RESECTION LEFT 5TH METATARSAL;  Surgeon: Angelika Jaramillo DPM;  [facility];  Service: Podiatry        ALLERGIES:  Patient has no known allergies  MEDICATIONS:  Current Outpatient Medications   Medication Sig Dispense Refill    atorvastatin (LIPITOR) 40 mg tablet TAKE 1 TABLET BY MOUTH EVERY DAY AT NIGHT      B-D ULTRAFINE III SHORT PEN 31G X 8 MM MISC USE FOR 2 INJECTIONS PER DAY      carvedilol (COREG) 25 mg tablet TAKE 2 TABLETS (50 MG TOTAL) BY MOUTH 2 (TWO) TIMES A DAY WITH MEALS   chlorthalidone 25 mg tablet Take 50 mg by mouth daily      Continuous Blood Gluc Sensor (FreeStyle Miah 2 Sensor) MISC       diltiazem (CARDIZEM CD) 120 mg 24 hr capsule Take 1 capsule (120 mg total) by mouth daily 30 capsule 0    fluticasone (FLONASE) 50 mcg/act nasal spray 2 sprays if needed        furosemide (LASIX) 40 mg tablet as needed      Lantus SoloStar 100 units/mL injection pen INJECT 45 UNTIS IN THE MORNING AND 80 UNTITS AT NIGHT      Lyumjev KwikPen 100 UNIT/ML SOPN if needed 20 units for blood sugar spike       metFORMIN (GLUCOPHAGE) 1000 MG tablet Take 1,000 mg by mouth 2 (two) times a day      potassium chloride (K-DUR,KLOR-CON) 20 mEq tablet as needed With Lasix       Pradaxa 150 MG capsu Take 150 mg by mouth 2 (two) times a day      ramipril (ALTACE) 10 MG capsule TAKE 2 TABLETS (20 MG TOTAL) IN THE MORNING AND TAKE 1 TABLET (10 MG TOTAL) IN THE EVENING        terazosin (HYTRIN) 2 mg capsule 4 mg 2 (two) times a day         No current facility-administered medications for this visit  SOCIAL HISTORY:  Social History     Socioeconomic History    Marital status: /Civil Union     Spouse name: None    Number of children: None    Years of education: None    Highest education level: None   Occupational History    None   Tobacco Use    Smoking status: Never Smoker    Smokeless tobacco: Former User     Types: Chew   Vaping Use    Vaping Use: Never used   Substance and Sexual Activity    Alcohol use: Yes     Comment: Rare    Drug use: Never    Sexual activity: None   Other Topics Concern    None   Social History Narrative    None     Social Determinants of Health     Financial Resource Strain: Not on file   Food Insecurity: No Food Insecurity    Worried About Running Out of Food in the Last Year: Never true    Noemi of Food in the Last Year: Never true   Transportation Needs: No Transportation Needs    Lack of Transportation (Medical): No    Lack of Transportation (Non-Medical): No   Physical Activity: Not on file   Stress: Not on file   Social Connections: Not on file   Intimate Partner Violence: Not on file   Housing Stability: Unknown    Unable to Pay for Housing in the Last Year: Not on file    Number of Places Lived in the Last Year: 1    Unstable Housing in the Last Year: No        REVIEW OF SYSTEMS  GENERAL: No fever or chills  HEART: No chest pain, or palpitation  RESPIRATORY:  No SOB or cough  GI: No Nausea, vomit or diarrhea  NEUROLOGIC: No syncope or acute weakness  MUSCULOSKELETAL: No calf pain or edema  PHYSICAL EXAMINATION  GENERAL  The patient appears in NAD / non-toxic  Afebrile  VSS    VASCULAR EXAM  Pedal pulses and vascular status are intact  No calf pain or edema bilaterally  No cyanosis  DERMATOLOGIC EXAM  No wound left foot  Redness and warmth seems to be resolved  Slight pitting edema around TMTJ left foot  No signs of abscess  NEUROLOGIC EXAM  AAO X 3  No focal neurologic deficit  Neurologic status is intact BLE  MUSCULOSKELETAL EXAM  ROM intact  No fluctuation or crepitus

## 2022-06-23 ENCOUNTER — TELEPHONE (OUTPATIENT)
Dept: PODIATRY | Facility: CLINIC | Age: 52
End: 2022-06-23

## 2022-06-23 NOTE — TELEPHONE ENCOUNTER
Patient called checking on lab work results  Wants to know what next steps are      Please contact patient

## 2022-06-24 ENCOUNTER — TELEPHONE (OUTPATIENT)
Dept: PODIATRY | Facility: CLINIC | Age: 52
End: 2022-06-24

## 2022-06-24 NOTE — TELEPHONE ENCOUNTER
I received a call from [facility]  The MRI was denied and they would like someone from the office to let them know how to proceed

## 2022-07-05 ENCOUNTER — OFFICE VISIT (OUTPATIENT)
Dept: PODIATRY | Facility: CLINIC | Age: 52
End: 2022-07-05
Payer: COMMERCIAL

## 2022-07-05 VITALS
SYSTOLIC BLOOD PRESSURE: 133 MMHG | BODY MASS INDEX: 35.76 KG/M2 | DIASTOLIC BLOOD PRESSURE: 81 MMHG | WEIGHT: 264 LBS | HEART RATE: 51 BPM | HEIGHT: 72 IN

## 2022-07-05 DIAGNOSIS — E11.21 TYPE 2 DIABETES MELLITUS WITH DIABETIC NEPHROPATHY, WITHOUT LONG-TERM CURRENT USE OF INSULIN (HCC): ICD-10-CM

## 2022-07-05 DIAGNOSIS — M10.372 GOUT OF LEFT FOOT DUE TO RENAL IMPAIRMENT, UNSPECIFIED CHRONICITY: Primary | ICD-10-CM

## 2022-07-05 PROCEDURE — 99213 OFFICE O/P EST LOW 20 MIN: CPT | Performed by: PODIATRIST

## 2022-07-05 NOTE — PROGRESS NOTES
PATIENT:  Dianne Luu      1970        ASSESSMENT     1  Gout of left foot due to renal impairment, unspecified chronicity     2  Type 2 diabetes mellitus with diabetic nephropathy, without long-term current use of insulin ([facility] )             PLAN  Reviewed / discussed blood work  Uric acid was 7 8  His symptom is most likely due to gout  His symptom is minimal at this time  Continue low purine diet  Instructed him to follow-up with his PCP for hyperuricemia  Hold off on MRI for now unless there is any increased symptoms  Continue to monitor for any increased pain, redness, or edema  Call if any increase in pain, fevers, calf pain, shortness of breath, or general distress is noted  Patient instructed to go to ER if call is not returned immediately  Slowly advance shoes  RA in 3 weeks  HISTORY OF PRESENT ILLNESS  Patient presents for left foot evaluation  No recurring redness or swelling to left foot  He feels well with no pain  No wound left foot  He tolerates surgical shoe / CAM walker / sneakers when he walks          PAST MEDICAL HISTORY:  Past Medical History:   Diagnosis Date    A-fib [facility])     BPH (benign prostatic hyperplasia)     CPAP (continuous positive airway pressure) dependence     Diabetes mellitus ([facility] )     GERD (gastroesophageal reflux disease)     History of COVID-19 01/2022    mild s/s but went into a fib    Hyperlipidemia     Hypertension     MRSA infection 2021    back cyst    Sleep apnea     Tailor's bunion of left foot        PAST SURGICAL HISTORY:  Past Surgical History:   Procedure Laterality Date    [facility]  [facility]    CARDIAC ELECTROPHYSIOLOGY STUDY AND ABLATION      CARDIOVERSION      CATARACT EXTRACTION Bilateral 07/2021    CLOSURE DELAYED PRIMARY Left 5/1/2022    Procedure: CLOSURE DELAYED PRIMARY VAC REMOVED;  Surgeon: Lillie Howell DPM;  [facility];  Service: Podiatry    COLONOSCOPY      INCISION AND DRAINAGE OF WOUND Left 4/28/2022    Procedure: foot I&D with vac application; 5TH metatarsal bone biopsy;  Surgeon: Alfonso Bar DPM;  [facility];  Service: Podiatry    NOSE SURGERY      TOE OSTEOTOMY Left 4/8/2022    Procedure: RESECTION LEFT 5TH METATARSAL;  Surgeon: Alfonso Bar DPM;  Location:  MAIN OR;  Service: Podiatry        ALLERGIES:  Patient has no known allergies  MEDICATIONS:  Current Outpatient Medications   Medication Sig Dispense Refill    atorvastatin (LIPITOR) 40 mg tablet TAKE 1 TABLET BY MOUTH EVERY DAY AT NIGHT      B-D ULTRAFINE III SHORT PEN 31G X 8 MM MISC USE FOR 2 INJECTIONS PER DAY      carvedilol (COREG) 25 mg tablet TAKE 2 TABLETS (50 MG TOTAL) BY MOUTH 2 (TWO) TIMES A DAY WITH MEALS   chlorthalidone 25 mg tablet Take 50 mg by mouth daily      Continuous Blood Gluc Sensor (FreeStyle Miah 2 Sensor) MISC       diltiazem (CARDIZEM CD) 120 mg 24 hr capsule Take 1 capsule (120 mg total) by mouth daily 30 capsule 0    fluticasone (FLONASE) 50 mcg/act nasal spray 2 sprays if needed        furosemide (LASIX) 40 mg tablet as needed      Lantus SoloStar 100 units/mL injection pen INJECT 45 UNTIS IN THE MORNING AND 80 UNTITS AT NIGHT      Lyumjev KwikPen 100 UNIT/ML SOPN if needed 20 units for blood sugar spike       metFORMIN (GLUCOPHAGE) 1000 MG tablet Take 1,000 mg by mouth 2 (two) times a day      potassium chloride (K-DUR,KLOR-CON) 20 mEq tablet as needed With Lasix       Pradaxa 150 MG capsu Take 150 mg by mouth 2 (two) times a day      ramipril (ALTACE) 10 MG capsule TAKE 2 TABLETS (20 MG TOTAL) IN THE MORNING AND TAKE 1 TABLET (10 MG TOTAL) IN THE EVENING   terazosin (HYTRIN) 2 mg capsule 4 mg 2 (two) times a day         No current facility-administered medications for this visit         SOCIAL HISTORY:  Social History     Socioeconomic History    Marital status: /Civil Union     Spouse name: None    Number of children: None    Years of education: None    Highest education level: None   Occupational History    None   Tobacco Use    Smoking status: Never Smoker    Smokeless tobacco: Former User     Types: Chew   Vaping Use    Vaping Use: Never used   Substance and Sexual Activity    Alcohol use: Yes     Comment: Rare    Drug use: Never    Sexual activity: None   Other Topics Concern    None   Social History Narrative    None     Social Determinants of Health     Financial Resource Strain: Not on file   Food Insecurity: No Food Insecurity    Worried About Running Out of Food in the Last Year: Never true    Noemi of Food in the Last Year: Never true   Transportation Needs: No Transportation Needs    Lack of Transportation (Medical): No    Lack of Transportation (Non-Medical): No   Physical Activity: Not on file   Stress: Not on file   Social Connections: Not on file   Intimate Partner Violence: Not on file   Housing Stability: Unknown    Unable to Pay for Housing in the Last Year: Not on file    Number of Places Lived in the Last Year: 1    Unstable Housing in the Last Year: No        REVIEW OF SYSTEMS  GENERAL: No fever or chills  HEART: No chest pain, or palpitation  RESPIRATORY:  No SOB or cough  GI: No Nausea, vomit or diarrhea  NEUROLOGIC: No syncope or acute weakness  MUSCULOSKELETAL: No calf pain or edema  PHYSICAL EXAMINATION  GENERAL  The patient appears in NAD / non-toxic  Afebrile  VSS    VASCULAR EXAM  Pedal pulses and vascular status are intact  No calf pain or edema bilaterally  No cyanosis  No acute LE edema  DERMATOLOGIC EXAM  No wound left foot  No redness or warmth  No significant swelling  No signs of abscess  NEUROLOGIC EXAM  AAO X 3   CN intact  No focal neurologic deficit  Neurologic status is intact BLE  MUSCULOSKELETAL EXAM  ROM intact  No fluctuation or crepitus  No injury  MMT WNL

## 2022-07-19 ENCOUNTER — TELEPHONE (OUTPATIENT)
Dept: PODIATRY | Facility: CLINIC | Age: 52
End: 2022-07-19

## 2022-07-19 NOTE — TELEPHONE ENCOUNTER
Patient called and LM on surgery line in regards to getting a letter for return to work  Call placed to patient and spoke with patient's wife  She is stating that he needs a letter stating an approximate return to work date of 8/1/22 for his employer  He has an upcoming office eval scheduled for 7/26/22 and will most likely be give full clearance at that time  Letter generated as requested and emailed to wife at [email or web address]

## 2022-07-26 ENCOUNTER — OFFICE VISIT (OUTPATIENT)
Dept: PODIATRY | Facility: CLINIC | Age: 52
End: 2022-07-26

## 2022-07-26 VITALS — WEIGHT: 264 LBS | HEIGHT: 72 IN | BODY MASS INDEX: 35.76 KG/M2

## 2022-07-26 DIAGNOSIS — L97.511 SKIN ULCER OF TOE OF RIGHT FOOT, LIMITED TO BREAKDOWN OF SKIN (HCC): Primary | ICD-10-CM

## 2022-07-26 DIAGNOSIS — E11.21 TYPE 2 DIABETES MELLITUS WITH DIABETIC NEPHROPATHY, WITHOUT LONG-TERM CURRENT USE OF INSULIN (HCC): ICD-10-CM

## 2022-07-26 PROCEDURE — 99024 POSTOP FOLLOW-UP VISIT: CPT | Performed by: PODIATRIST

## 2022-07-26 RX ORDER — AMLODIPINE BESYLATE 5 MG/1
5 TABLET ORAL 2 TIMES DAILY
COMMUNITY
Start: 2022-07-13

## 2022-07-26 NOTE — PROGRESS NOTES
PATIENT:  Joel Benitez      1970        ASSESSMENT     1  Skin ulcer of toe of right foot, limited to breakdown of skin ([facility] [address] )     2  Type 2 diabetes mellitus with diabetic nephropathy, without long-term current use of insulin ([facility] )             PLAN  1  Reviewed medical records  He has new ulcer on right great toe  Instructed daily local care  2  Surgical shoe and knee scooter for off-loading  3  Tight BS control  Continue to monitor left foot for any increased pain, redness, or edema  4  RA in 2 weeks  HISTORY OF PRESENT ILLNESS  Patient presents for left foot evaluation  No recurring redness or swelling to left foot  He feels well with no pain  Tolerates regular shoes well  He has new ulcer on right great toe  [place]  He was wearing Crocs without socks for a day and developed a blister  BS is under control      PAST MEDICAL HISTORY:  Past Medical History:   Diagnosis Date    A-fib [facility])     BPH (benign prostatic hyperplasia)     CPAP (continuous positive airway pressure) dependence     Diabetes mellitus ([facility] )     GERD (gastroesophageal reflux disease)     History of COVID-19 01/2022    mild s/s but went into a fib    Hyperlipidemia     Hypertension     MRSA infection 2021    back cyst    Sleep apnea     Tailor's bunion of left foot        PAST SURGICAL HISTORY:  Past Surgical History:   Procedure Laterality Date    CARDIAC CATHETERIZATION  2010    [facility]    CARDIAC ELECTROPHYSIOLOGY STUDY AND ABLATION      CARDIOVERSION      CATARACT EXTRACTION Bilateral 07/2021    CLOSURE DELAYED PRIMARY Left 5/1/2022    Procedure: CLOSURE DELAYED PRIMARY VAC REMOVED;  Surgeon: Selene Gillette DPM;  [facility];  Service: Podiatry    COLONOSCOPY      INCISION AND DRAINAGE OF WOUND Left 4/28/2022    Procedure: foot I&D with vac application; 5TH metatarsal bone biopsy;  Surgeon: Selene Gillette DPM;  [facility];  Service: Podiatry    NOSE SURGERY      TOE OSTEOTOMY Left 4/8/2022    Procedure: RESECTION LEFT 5TH METATARSAL;  Surgeon: Nelda Campbell DPM;  Location:  MAIN OR;  Service: Podiatry        ALLERGIES:  Patient has no known allergies  MEDICATIONS:  Current Outpatient Medications   Medication Sig Dispense Refill    amLODIPine (NORVASC) 5 mg tablet Take 5 mg by mouth 2 (two) times a day      atorvastatin (LIPITOR) 40 mg tablet TAKE 1 TABLET BY MOUTH EVERY DAY AT NIGHT      B-D ULTRAFINE III SHORT PEN 31G X 8 MM MISC USE FOR 2 INJECTIONS PER DAY      carvedilol (COREG) 25 mg tablet TAKE 2 TABLETS (50 MG TOTAL) BY MOUTH 2 (TWO) TIMES A DAY WITH MEALS   chlorthalidone 25 mg tablet Take 50 mg by mouth daily      Continuous Blood Gluc Sensor (FreeStyle Miah 2 Sensor) MISC       fluticasone (FLONASE) 50 mcg/act nasal spray 2 sprays if needed        furosemide (LASIX) 40 mg tablet as needed      Lantus SoloStar 100 units/mL injection pen INJECT 45 UNTIS IN THE MORNING AND 80 UNTITS AT NIGHT      Lyumjev KwikPen 100 UNIT/ML SOPN if needed 20 units for blood sugar spike       metFORMIN (GLUCOPHAGE) 1000 MG tablet Take 1,000 mg by mouth 2 (two) times a day      potassium chloride (K-DUR,KLOR-CON) 20 mEq tablet as needed With Lasix       Pradaxa 150 MG capsu Take 150 mg by mouth 2 (two) times a day      ramipril (ALTACE) 10 MG capsule TAKE 2 TABLETS (20 MG TOTAL) IN THE MORNING AND TAKE 1 TABLET (10 MG TOTAL) IN THE EVENING   terazosin (HYTRIN) 2 mg capsule 4 mg 2 (two) times a day         No current facility-administered medications for this visit         SOCIAL HISTORY:  Social History     Socioeconomic History    Marital status: /Civil Union     Spouse name: None    Number of children: None    Years of education: None    Highest education level: None   Occupational History    None   Tobacco Use    Smoking status: Never Smoker    Smokeless tobacco: Former User     Types: Chew   Vaping Use    Vaping Use: Never used   Substance and Sexual Activity    Alcohol use: Yes     Comment: Rare    Drug use: Never    Sexual activity: None   Other Topics Concern    None   Social History Narrative    None     Social Determinants of Health     Financial Resource Strain: Not on file   Food Insecurity: No Food Insecurity    Worried About Running Out of Food in the Last Year: Never true    Noemi of Food in the Last Year: Never true   Transportation Needs: No Transportation Needs    Lack of Transportation (Medical): No    Lack of Transportation (Non-Medical): No   Physical Activity: Not on file   Stress: Not on file   Social Connections: Not on file   Intimate Partner Violence: Not on file   Housing Stability: Unknown    Unable to Pay for Housing in the Last Year: Not on file    Number of Places Lived in the Last Year: 1    Unstable Housing in the Last Year: No        REVIEW OF SYSTEMS  GENERAL: No fever or chills  HEART: No chest pain, or palpitation  RESPIRATORY:  No SOB or cough  GI: No Nausea, vomit or diarrhea  NEUROLOGIC: No syncope or acute weakness  MUSCULOSKELETAL: No calf pain or edema  PHYSICAL EXAMINATION  GENERAL  The patient appears in NAD / non-toxic  Afebrile  VSS    VASCULAR EXAM  Pedal pulses and vascular status are intact  No calf pain or edema bilaterally  No cyanosis  No acute LE edema  DERMATOLOGIC EXAM  No wound left foot  No redness or warmth  No significant swelling  No signs of abscess  Partial thick ulcer on right plantar great toe  Wound bed is granular  No infection  NEUROLOGIC EXAM  AAO X 3   CN intact  No focal neurologic deficit  Neurologic status is intact BLE  MUSCULOSKELETAL EXAM  ROM intact  No fluctuation or crepitus  No injury  MMT WNL

## 2022-08-09 ENCOUNTER — OFFICE VISIT (OUTPATIENT)
Dept: PODIATRY | Facility: CLINIC | Age: 52
End: 2022-08-09
Payer: COMMERCIAL

## 2022-08-09 VITALS — BODY MASS INDEX: 35.62 KG/M2 | WEIGHT: 263 LBS | HEIGHT: 72 IN

## 2022-08-09 DIAGNOSIS — L97.511 SKIN ULCER OF TOE OF RIGHT FOOT, LIMITED TO BREAKDOWN OF SKIN (HCC): Primary | ICD-10-CM

## 2022-08-09 DIAGNOSIS — E11.40 TYPE 2 DIABETES MELLITUS WITH DIABETIC NEUROPATHY, WITH LONG-TERM CURRENT USE OF INSULIN (HCC): ICD-10-CM

## 2022-08-09 DIAGNOSIS — Z79.4 TYPE 2 DIABETES MELLITUS WITH DIABETIC NEUROPATHY, WITH LONG-TERM CURRENT USE OF INSULIN (HCC): ICD-10-CM

## 2022-08-09 PROCEDURE — 99212 OFFICE O/P EST SF 10 MIN: CPT | Performed by: PODIATRIST

## 2022-08-09 NOTE — PROGRESS NOTES
PATIENT:  Suzi Capellan      1970        ASSESSMENT     1  Skin ulcer of toe of right foot, limited to breakdown of skin ([facility] [address] )     2  Type 2 diabetes mellitus with diabetic neuropathy, with long-term current use of insulin ([facility] [address] )             PLAN  1  Reviewed medical records  Right great toe ulcer is healed  No wound on left foot  2  Okay to wear diabetic shoes  Slowly increase activity as tolerated  3  Instructed daily skin care/ foot care to prevent ulcer in his feet  4  Tight BS control  Continue to monitor left foot for any increased pain, redness, or edema  5  RA in 3 months  HISTORY OF PRESENT ILLNESS  Patient presents for foot evaluation  No recurring redness or swelling to left foot  He feels well with no pain  Wound closed right great toe  BS is under control      PAST MEDICAL HISTORY:  Past Medical History:   Diagnosis Date    [facility]-fib [facility])     BPH (benign prostatic hyperplasia)     CPAP (continuous positive airway pressure) dependence     Diabetes mellitus ([facility] [address] )     GERD (gastroesophageal reflux disease)     History of COVID-19 01/2022    mild s/s but went into a fib    Hyperlipidemia     Hypertension     MRSA infection 2021    back cyst    Sleep apnea     Tailor's bunion of left foot        PAST SURGICAL HISTORY:  Past Surgical History:   Procedure Laterality Date    CARDIAC [facility]HETERIZATION  2010    [facility]    CARDIAC ELECTROPHYSIOLOGY STUDY AND ABLATION      CARDIOVERSION      CATARACT EXTRACTION Bilateral 07/2021    CLOSURE DELAYED PRIMARY Left 5/1/2022    Procedure: CLOSURE DELAYED PRIMARY VAC REMOVED;  Surgeon: Abdulaziz Hollis DPM;  [facility];  Service: Podiatry    COLONOSCOPY      INCISION AND DRAINAGE OF WOUND Left 4/28/2022    Procedure: foot I&D with vac application; 5TH metatarsal bone biopsy;  Surgeon: Abdulaziz Hollis DPM;  [facility];  Service: Podiatry    NOSE SURGERY      TOE OSTEOTOMY Left 4/8/2022    Procedure: RESECTION LEFT 5TH METATARSAL;  Surgeon: Lillie Howell DPM;  Location:  MAIN OR;  Service: Podiatry        ALLERGIES:  Patient has no known allergies  MEDICATIONS:  Current Outpatient Medications   Medication Sig Dispense Refill    amLODIPine (NORVASC) 5 mg tablet Take 5 mg by mouth 2 (two) times a day      atorvastatin (LIPITOR) 40 mg tablet TAKE 1 TABLET BY MOUTH EVERY DAY AT NIGHT      B-D ULTRAFINE III SHORT PEN 31G X 8 MM MISC USE FOR 2 INJECTIONS PER DAY      carvedilol (COREG) 25 mg tablet TAKE 2 TABLETS (50 MG TOTAL) BY MOUTH 2 (TWO) TIMES A DAY WITH MEALS   chlorthalidone 25 mg tablet Take 50 mg by mouth daily      Continuous Blood Gluc Sensor (FreeStyle Miah 2 Sensor) MISC       fluticasone (FLONASE) 50 mcg/act nasal spray 2 sprays if needed        furosemide (LASIX) 40 mg tablet as needed      Lantus SoloStar 100 units/mL injection pen INJECT 45 UNTIS IN THE MORNING AND 80 UNTITS AT NIGHT      Lyumjev KwikPen 100 UNIT/ML SOPN if needed 20 units for blood sugar spike       metFORMIN (GLUCOPHAGE) 1000 MG tablet Take 1,000 mg by mouth 2 (two) times a day      potassium chloride (K-DUR,KLOR-CON) 20 mEq tablet as needed With Lasix       Pradaxa 150 MG capsu Take 150 mg by mouth 2 (two) times a day      ramipril (ALTACE) 10 MG capsule TAKE 2 TABLETS (20 MG TOTAL) IN THE MORNING AND TAKE 1 TABLET (10 MG TOTAL) IN THE EVENING   terazosin (HYTRIN) 2 mg capsule 4 mg 2 (two) times a day         No current facility-administered medications for this visit         SOCIAL HISTORY:  Social History     Socioeconomic History    Marital status: /Civil Union     Spouse name: None    Number of children: None    Years of education: None    Highest education level: None   Occupational History    None   Tobacco Use    Smoking status: Never Smoker    Smokeless tobacco: Former User     Types: Chew   Vaping Use    Vaping Use: Never used   Substance and Sexual Activity    Alcohol use: Yes     Comment: Rare  Drug use: Never    Sexual activity: None   Other Topics Concern    None   Social History Narrative    None     Social Determinants of Health     Financial Resource Strain: Not on file   Food Insecurity: No Food Insecurity    Worried About Running Out of Food in the Last Year: Never true    Noemi of Food in the Last Year: Never true   Transportation Needs: No Transportation Needs    Lack of Transportation (Medical): No    Lack of Transportation (Non-Medical): No   Physical Activity: Not on file   Stress: Not on file   Social Connections: Not on file   Intimate Partner Violence: Not on file   Housing Stability: Unknown    Unable to Pay for Housing in the Last Year: Not on file    Number of Places Lived in the Last Year: 1    Unstable Housing in the Last Year: No        REVIEW OF SYSTEMS  GENERAL: No fever or chills  HEART: No chest pain, or palpitation  RESPIRATORY:  No SOB or cough  GI: No Nausea, vomit or diarrhea  NEUROLOGIC: No syncope or acute weakness  MUSCULOSKELETAL: No calf pain or edema  PHYSICAL EXAMINATION  GENERAL  The patient appears in NAD / non-toxic  Afebrile  VSS    VASCULAR EXAM  Pedal pulses and vascular status are intact  No calf pain or edema bilaterally  No cyanosis  No acute LE edema  DERMATOLOGIC EXAM  No wound left foot  No redness or warmth  No significant swelling  No signs of abscess  Ulcer healed right great toe  NEUROLOGIC EXAM  AAO X 3   CN intact  No focal neurologic deficit  Neurologic status is intact BLE  MUSCULOSKELETAL EXAM  ROM intact  No fluctuation or crepitus  No injury  MMT WNL

## 2022-08-31 ENCOUNTER — OFFICE VISIT (OUTPATIENT)
Dept: PODIATRY | Facility: CLINIC | Age: 52
End: 2022-08-31
Payer: COMMERCIAL

## 2022-08-31 VITALS — WEIGHT: 264 LBS | BODY MASS INDEX: 35.76 KG/M2 | HEIGHT: 72 IN

## 2022-08-31 DIAGNOSIS — E11.40 TYPE 2 DIABETES MELLITUS WITH DIABETIC NEUROPATHY, WITH LONG-TERM CURRENT USE OF INSULIN (HCC): Primary | ICD-10-CM

## 2022-08-31 DIAGNOSIS — Z79.4 TYPE 2 DIABETES MELLITUS WITH DIABETIC NEUROPATHY, WITH LONG-TERM CURRENT USE OF INSULIN (HCC): Primary | ICD-10-CM

## 2022-08-31 DIAGNOSIS — S90.211A CONTUSION OF RIGHT GREAT TOE WITH DAMAGE TO NAIL, INITIAL ENCOUNTER: ICD-10-CM

## 2022-08-31 PROCEDURE — 99212 OFFICE O/P EST SF 10 MIN: CPT | Performed by: PODIATRIST

## 2022-08-31 NOTE — PROGRESS NOTES
PATIENT:  Karthik Areas      1970        ASSESSMENT     1  Type 2 diabetes mellitus with diabetic neuropathy, with long-term current use of insulin ([facility] [address] )     2  Contusion of right great toe with damage to nail, initial encounter             PLAN  Stable subungal hematoma  Instructed him to monitor for any increased pain, signs of infection, or drainage  Discussed possible need for nail procedure  Discussed proper footwear off-loading around toes  He will return as scheduled for diabetic foot exam       HISTORY OF PRESENT ILLNESS  Patient presents for concerning discoloration of right great toe  He noticed discoloration under the nail on Saturday  He does not recall any injury  No drainage  No active wounds in his feet      PAST MEDICAL HISTORY:  Past Medical History:   Diagnosis Date    A-fib [facility])     BPH (benign prostatic hyperplasia)     CPAP (continuous positive airway pressure) dependence     Diabetes mellitus ([facility] [address] )     GERD (gastroesophageal reflux disease)     History of COVID-19 01/2022    mild s/s but went into a fib    Hyperlipidemia     Hypertension     MRSA infection 2021    back cyst    Sleep apnea     Tailor's bunion of left foot        PAST SURGICAL HISTORY:  Past Surgical History:   Procedure Laterality Date    CARD[facility]AC [facility]HETERIZATION  2010    [facility]    CARDIAC ELECTROPHYSIOLOGY STUDY AND ABLATION      CARDIOVERSION      CATARACT EXTRACTION Bilateral 07/2021    CLOSURE DELAYED PRIMARY Left 5/1/2022    Procedure: CLOSURE DELAYED PRIMARY VAC REMOVED;  Surgeon: Shakeel Epperson DPM;  [facility];  Service: Podiatry    COLONOSCOPY      INCISION AND DRAINAGE OF WOUND Left 4/28/2022    Procedure: foot I&D with vac application; 5TH metatarsal bone biopsy;  Surgeon: Shakeel Epperson DPM;  [facility];  Service: Podiatry    NOSE SURGERY      TOE OSTEOTOMY Left 4/8/2022    Procedure: RESECTION LEFT 5TH METATARSAL;  Surgeon: Shakeel Epperson DPM;  [facility]; Service: Podiatry        ALLERGIES:  Patient has no known allergies  MEDICATIONS:  Current Outpatient Medications   Medication Sig Dispense Refill    amLODIPine (NORVASC) 5 mg tablet Take 5 mg by mouth 2 (two) times a day      atorvastatin (LIPITOR) 40 mg tablet TAKE 1 TABLET BY MOUTH EVERY DAY AT NIGHT      B-D ULTRAFINE III SHORT PEN 31G X 8 MM MISC USE FOR 2 INJECTIONS PER DAY      carvedilol (COREG) 25 mg tablet TAKE 2 TABLETS (50 MG TOTAL) BY MOUTH 2 (TWO) TIMES A DAY WITH MEALS   chlorthalidone 25 mg tablet Take 50 mg by mouth daily      Continuous Blood Gluc Sensor (FreeStyle Miah 2 Sensor) MISC       fluticasone (FLONASE) 50 mcg/act nasal spray 2 sprays if needed        furosemide (LASIX) 40 mg tablet as needed      Lantus SoloStar 100 units/mL injection pen INJECT 45 UNTIS IN THE MORNING AND 80 UNTITS AT NIGHT      Lyumjev KwikPen 100 UNIT/ML SOPN if needed 20 units for blood sugar spike       metFORMIN (GLUCOPHAGE) 1000 MG tablet Take 1,000 mg by mouth 2 (two) times a day      potassium chloride (K-DUR,KLOR-CON) 20 mEq tablet as needed With Lasix       Pradaxa 150 MG capsu Take 150 mg by mouth 2 (two) times a day      ramipril (ALTACE) 10 MG capsule TAKE 2 TABLETS (20 MG TOTAL) IN THE MORNING AND TAKE 1 TABLET (10 MG TOTAL) IN THE EVENING   terazosin (HYTRIN) 2 mg capsule 4 mg 2 (two) times a day         No current facility-administered medications for this visit         SOCIAL HISTORY:  Social History     Socioeconomic History    Marital status: /Civil Union     Spouse name: None    Number of children: None    Years of education: None    Highest education level: None   Occupational History    None   Tobacco Use    Smoking status: Never Smoker    Smokeless tobacco: Former User     Types: Chew   Vaping Use    Vaping Use: Never used   Substance and Sexual Activity    Alcohol use: Yes     Comment: Rare    Drug use: Never    Sexual activity: None   Other Topics Concern    None   Social History Narrative    None     Social Determinants of Health     Financial Resource Strain: Not on file   Food Insecurity: No Food Insecurity    Worried About Running Out of Food in the Last Year: Never true    Noemi of Food in the Last Year: Never true   Transportation Needs: No Transportation Needs    Lack of Transportation (Medical): No    Lack of Transportation (Non-Medical): No   Physical Activity: Not on file   Stress: Not on file   Social Connections: Not on file   Intimate Partner Violence: Not on file   Housing Stability: Unknown    Unable to Pay for Housing in the Last Year: Not on file    Number of Places Lived in the Last Year: 1    Unstable Housing in the Last Year: No        REVIEW OF SYSTEMS  GENERAL: No fever or chills  HEART: No chest pain, or palpitation  RESPIRATORY:  No SOB or cough  GI: No Nausea, vomit or diarrhea  NEUROLOGIC: No syncope or acute weakness  MUSCULOSKELETAL: No calf pain or edema  PHYSICAL EXAMINATION  GENERAL  The patient appears in NAD / non-toxic  Afebrile  VSS    VASCULAR EXAM  Pedal pulses and vascular status are intact  No cyanosis  No acute LE edema  DERMATOLOGIC EXAM  No wounds in his feet  No redness or warmth  No significant swelling  Dry subungal hematoma right great toe  Nail plate intact  No signs of infection  NEUROLOGIC EXAM  AAO X 3   CN intact  No focal neurologic deficit  Neurologic status is intact BLE  MUSCULOSKELETAL EXAM  ROM intact  No fluctuation or crepitus  No injury  MMT WNL

## 2022-10-12 PROBLEM — Z12.11 SCREEN FOR COLON CANCER: Status: RESOLVED | Noted: 2020-12-02 | Resolved: 2022-10-12

## 2022-11-01 ENCOUNTER — OFFICE VISIT (OUTPATIENT)
Dept: PODIATRY | Facility: CLINIC | Age: 52
End: 2022-11-01

## 2022-11-01 VITALS — BODY MASS INDEX: 35.76 KG/M2 | WEIGHT: 264 LBS | HEIGHT: 72 IN

## 2022-11-01 DIAGNOSIS — Z79.4 TYPE 2 DIABETES MELLITUS WITH DIABETIC NEUROPATHY, WITH LONG-TERM CURRENT USE OF INSULIN (HCC): Primary | ICD-10-CM

## 2022-11-01 DIAGNOSIS — E11.40 TYPE 2 DIABETES MELLITUS WITH DIABETIC NEUROPATHY, WITH LONG-TERM CURRENT USE OF INSULIN (HCC): Primary | ICD-10-CM

## 2022-11-01 NOTE — PROGRESS NOTES
PATIENT:  Mara Hogan      1970        ASSESSMENT     1  Type 2 diabetes mellitus with diabetic neuropathy, with long-term current use of insulin ([facility] )             PLAN  1  Reviewed medical records  Patient was counseled on the condition and diagnosis  Educated disease prevention and risks related to diabetes  2  Educated proper daily foot care and exam   Instructed proper skin care / protection and footwear  3   Discussed proper blood glucose control with diet and exercise  Low purine diet  4  The patient will return in 3 months for diabetic foot exam       HISTORY OF PRESENT ILLNESS  Patient presents for diabetic foot evaluation  He has high risk diabetic foot with history of DFU  No recurring ulcer in his foot  His toenail of right great toe fell off  No drainage  No signs of infection in both feet  He feels well with no foot pain  BS is under control        PAST MEDICAL HISTORY:  Past Medical History:   Diagnosis Date   • A-fib ([place] [address] )    • BPH (benign prostatic hyperplasia)    • CPAP (continuous positive airway pressure) dependence    • Diabetes mellitus ([place] [address] )    • GERD (gastroesophageal reflux disease)    • History of COVID-19 01/2022    mild s/s but went into a fib   • Hyperlipidemia    • Hypertension    • MRSA infection 2021    back cyst   • Sleep apnea    • Tailor's bunion of left foot        PAST SURGICAL HISTORY:  Past Surgical History:   Procedure Laterality Date   • CARDIAC CATHETERIZATION  2010    [facility]   • CARDIAC ELECTROPHYSIOLOGY STUDY AND ABLATION     • CARDIOVERSION     • CATARACT EXTRACTION Bilateral 07/2021   • CLOSURE DELAYED PRIMARY Left 5/1/2022    Procedure: CLOSURE DELAYED PRIMARY VAC REMOVED;  Surgeon: Юлия Cancino DPM;  [facility];  Service: Podiatry   • COLONOSCOPY     • INCISION AND DRAINAGE OF WOUND Left 4/28/2022    Procedure: foot I&D with vac application; 5TH metatarsal bone biopsy;  Surgeon: Юлия Cancino DPM;  Location: [facility];  Service: Podiatry   • NOSE SURGERY     • TOE OSTEOTOMY Left 4/8/2022    Procedure: RESECTION LEFT 5TH METATARSAL;  Surgeon: Joo Alexander DPM;  Location:  MAIN OR;  Service: Podiatry        ALLERGIES:  Patient has no known allergies  MEDICATIONS:  Current Outpatient Medications   Medication Sig Dispense Refill   • amLODIPine (NORVASC) 5 mg tablet Take 5 mg by mouth 2 (two) times a day     • atorvastatin (LIPITOR) 40 mg tablet TAKE 1 TABLET BY MOUTH EVERY DAY AT NIGHT     • B-D ULTRAFINE III SHORT PEN 31G X 8 MM MISC USE FOR 2 INJECTIONS PER DAY     • carvedilol (COREG) 25 mg tablet TAKE 2 TABLETS (50 MG TOTAL) BY MOUTH 2 (TWO) TIMES A DAY WITH MEALS  • chlorthalidone 25 mg tablet Take 50 mg by mouth daily     • Continuous Blood Gluc Sensor (FreeStyle Miah 2 Sensor) MISC      • fluticasone (FLONASE) 50 mcg/act nasal spray 2 sprays if needed       • furosemide (LASIX) 40 mg tablet as needed     • Lantus SoloStar 100 units/mL injection pen INJECT 45 UNTIS IN THE MORNING AND 80 UNTITS AT NIGHT     • Lyumjev KwikPen 100 UNIT/ML SOPN if needed 20 units for blood sugar spike      • metFORMIN (GLUCOPHAGE) 1000 MG tablet Take 1,000 mg by mouth 2 (two) times a day     • potassium chloride (K-DUR,KLOR-CON) 20 mEq tablet as needed With Lasix      • Pradaxa 150 MG capsu Take 150 mg by mouth 2 (two) times a day     • ramipril (ALTACE) 10 MG capsule TAKE 2 TABLETS (20 MG TOTAL) IN THE MORNING AND TAKE 1 TABLET (10 MG TOTAL) IN THE EVENING  • terazosin (HYTRIN) 2 mg capsule 4 mg 2 (two) times a day         No current facility-administered medications for this visit         SOCIAL HISTORY:  Social History     Socioeconomic History   • Marital status: /Civil Union     Spouse name: None   • Number of children: None   • Years of education: None   • Highest education level: None   Occupational History   • None   Tobacco Use   • Smoking status: Never Smoker   • Smokeless tobacco: Former User     Types: Chew   Vaping Use   • Vaping Use: Never used   Substance and Sexual Activity   • Alcohol use: Yes     Comment: Rare   • Drug use: Never   • Sexual activity: None   Other Topics Concern   • None   Social History Narrative   • None     Social Determinants of Health     Financial Resource Strain: Not on file   Food Insecurity: No Food Insecurity   • Worried About Running Out of Food in the Last Year: Never true   • Ran Out of Food in the Last Year: Never true   Transportation Needs: No Transportation Needs   • Lack of Transportation (Medical): No   • Lack of Transportation (Non-Medical): No   Physical Activity: Not on file   Stress: Not on file   Social Connections: Not on file   Intimate Partner Violence: Not on file   Housing Stability: Unknown   • Unable to Pay for Housing in the Last Year: Not on file   • Number of Places Lived in the Last Year: 1   • Unstable Housing in the Last Year: No          REVIEW OF SYSTEMS:  GENERAL: No fever or chills  HEART: No chest pain, or palpitation  RESPIRATORY:  No acute SOB or cough  GI: No Nausea, vomit or diarrhea  NEUROLOGIC: No syncope or acute weakness    PHYSICAL EXAM:    Ht 6' (1 829 m) Comment: verbal  Wt 120 kg (264 lb)   BMI 35 80 kg/m²     VASCULAR EXAM  Dorsalis pedis  +1, Posterior tibial artery  +2  The patient has class findings with skin atrophy, lack of digital hair, and nail dystrophy  There is +1 lower extremity edema bilaterally  No ischemia  NEUROLOGIC EXAM  AAo X 3  Sensation is intact to light touch  Sensation is mildly decreased to 10gm monofilament  No focal neurologic deficit  DERMATOLOGIC EXAM:   No ulcer  No cellulitis or abscess noted  No notable skin lesion  Nail bed healed right great toe  MUSCULOSKELETAL EXAM:   No acute joint pain  No acute joint swelling or redness  No acute musculoskeletal problem  ROM intact  MMT intact  Risk Category:   3 = History of plantar ulceration

## 2022-11-17 ENCOUNTER — OFFICE VISIT (OUTPATIENT)
Dept: PODIATRY | Facility: CLINIC | Age: 52
End: 2022-11-17

## 2022-11-17 VITALS
WEIGHT: 264 LBS | DIASTOLIC BLOOD PRESSURE: 90 MMHG | BODY MASS INDEX: 35.76 KG/M2 | SYSTOLIC BLOOD PRESSURE: 154 MMHG | HEART RATE: 67 BPM | HEIGHT: 72 IN

## 2022-11-17 DIAGNOSIS — E11.40 TYPE 2 DIABETES MELLITUS WITH DIABETIC NEUROPATHY, WITH LONG-TERM CURRENT USE OF INSULIN (HCC): ICD-10-CM

## 2022-11-17 DIAGNOSIS — Z79.4 TYPE 2 DIABETES MELLITUS WITH DIABETIC NEUROPATHY, WITH LONG-TERM CURRENT USE OF INSULIN (HCC): ICD-10-CM

## 2022-11-17 DIAGNOSIS — L97.521 ULCER OF LEFT FOOT, LIMITED TO BREAKDOWN OF SKIN (HCC): Primary | ICD-10-CM

## 2022-11-17 NOTE — PROGRESS NOTES
PATIENT:  Mey Barrios      1970        ASSESSMENT     1  Ulcer of left foot, limited to breakdown of skin ([facility] )        2  Type 2 diabetes mellitus with diabetic neuropathy, with long-term current use of insulin ([facility] )                PLAN  1  Reviewed medical records  2  Wound is superficial without signs of infection  Instructed local care  Surgical shoe until it heals  3  May re-evaluate his shoes per   4  Resting and elevation  5  Tight BS control  Continue to monitor left foot for any increased pain, redness, or edema  6  RA in 2 weeks  HISTORY OF PRESENT ILLNESS  Patient presents for new wound on left foot  He had power walking for about an hour 2 days ago  He noticed a small area of bleeding on lateral left foot  No pain  No redness  BS is under control      PAST MEDICAL HISTORY:  Past Medical History:   Diagnosis Date   • A-fib ([place] [address] )    • BPH (benign prostatic hyperplasia)    • CPAP (continuous positive airway pressure) dependence    • Diabetes mellitus ([place] [address] )    • GERD (gastroesophageal reflux disease)    • History of COVID-19 01/2022    mild s/s but went into a fib   • Hyperlipidemia    • Hypertension    • MRSA infection 2021    back cyst   • Sleep apnea    • Tailor's bunion of left foot        PAST SURGICAL HISTORY:  Past Surgical History:   Procedure Laterality Date   • CARDIAC CATHETERIZATION  2010    [facility]   • CARDIAC ELECTROPHYSIOLOGY STUDY AND ABLATION     • CARDIOVERSION     • CATARACT EXTRACTION Bilateral 07/2021   • CLOSURE DELAYED PRIMARY Left 5/1/2022    Procedure: CLOSURE DELAYED PRIMARY VAC REMOVED;  Surgeon: Jc Varghese DPM;  [facility];  Service: Podiatry   • COLONOSCOPY     • INCISION AND DRAINAGE OF WOUND Left 4/28/2022    Procedure: foot I&D with vac application; 5TH metatarsal bone biopsy;  Surgeon: Jc Varghese DPM;  [facility];  Service: Podiatry   • NOSE SURGERY     • TOE OSTEOTOMY Left 4/8/2022    Procedure: RESECTION LEFT 5TH METATARSAL;  Surgeon: Jc Varghese DPM;  Location:  MAIN OR;  Service: Podiatry        ALLERGIES:  Patient has no known allergies  MEDICATIONS:  Current Outpatient Medications   Medication Sig Dispense Refill   • amLODIPine (NORVASC) 5 mg tablet Take 5 mg by mouth 2 (two) times a day     • atorvastatin (LIPITOR) 40 mg tablet TAKE 1 TABLET BY MOUTH EVERY DAY AT NIGHT     • B-D ULTRAFINE III SHORT PEN 31G X 8 MM MISC USE FOR 2 INJECTIONS PER DAY     • carvedilol (COREG) 25 mg tablet TAKE 2 TABLETS (50 MG TOTAL) BY MOUTH 2 (TWO) TIMES A DAY WITH MEALS  • chlorthalidone 25 mg tablet Take 50 mg by mouth daily     • Continuous Blood Gluc Sensor (FreeStyle Miah 2 Sensor) MISC      • fluticasone (FLONASE) 50 mcg/act nasal spray 2 sprays if needed       • furosemide (LASIX) 40 mg tablet as needed     • Lantus SoloStar 100 units/mL injection pen INJECT 45 UNTIS IN THE MORNING AND 80 UNTITS AT NIGHT     • Lyumjev KwikPen 100 UNIT/ML SOPN if needed 20 units for blood sugar spike      • metFORMIN (GLUCOPHAGE) 1000 MG tablet Take 1,000 mg by mouth 2 (two) times a day     • potassium chloride (K-DUR,KLOR-CON) 20 mEq tablet as needed With Lasix      • Pradaxa 150 MG capsu Take 150 mg by mouth 2 (two) times a day     • ramipril (ALTACE) 10 MG capsule TAKE 2 TABLETS (20 MG TOTAL) IN THE MORNING AND TAKE 1 TABLET (10 MG TOTAL) IN THE EVENING  • terazosin (HYTRIN) 2 mg capsule 4 mg 2 (two) times a day         No current facility-administered medications for this visit         SOCIAL HISTORY:  Social History     Socioeconomic History   • Marital status: /Civil Union     Spouse name: None   • Number of children: None   • Years of education: None   • Highest education level: None   Occupational History   • None   Tobacco Use   • Smoking status: Never   • Smokeless tobacco: Former     Types: Chew   Vaping Use   • Vaping Use: Never used   Substance and Sexual Activity   • Alcohol use: Yes     Comment: Rare   • Drug use: Never   • Sexual activity: None   Other Topics Concern   • None   Social History Narrative   • None     Social Determinants of Health     Financial Resource Strain: Not on file   Food Insecurity: No Food Insecurity   • Worried About Running Out of Food in the Last Year: Never true   • Ran Out of Food in the Last Year: Never true   Transportation Needs: No Transportation Needs   • Lack of Transportation (Medical): No   • Lack of Transportation (Non-Medical): No   Physical Activity: Not on file   Stress: Not on file   Social Connections: Not on file   Intimate Partner Violence: Not on file   Housing Stability: Unknown   • Unable to Pay for Housing in the Last Year: Not on file   • Number of Places Lived in the Last Year: 1   • Unstable Housing in the Last Year: No        REVIEW OF SYSTEMS  GENERAL: No fever or chills  HEART: No chest pain, or palpitation  RESPIRATORY:  No SOB or cough  GI: No Nausea, vomit or diarrhea  NEUROLOGIC: No syncope or acute weakness  MUSCULOSKELETAL: No calf pain or edema  PHYSICAL EXAMINATION  GENERAL  The patient appears in NAD / non-toxic  Afebrile  VSS    VASCULAR EXAM  Pedal pulses and vascular status are intact  No calf pain or edema bilaterally  No cyanosis  No acute LE edema  DERMATOLOGIC EXAM  Small, superficial wound / skin tear on lateral left 5th met  It is about 0 2 X 0 2 cm with minimal depth  Wound bed is granular  No redness or warmth  No significant swelling  No signs of abscess or infection  NEUROLOGIC EXAM  AAO X 3   CN intact  No focal neurologic deficit  Neurologic status is intact BLE  MUSCULOSKELETAL EXAM  ROM intact  No fluctuation or crepitus  No injury  MMT WNL

## 2022-11-18 NOTE — PROGRESS NOTES
PATIENT:  Deni House      1970      ASSESSMENT     1  Disruption of external surgical wound, initial encounter           PLAN  Wound looks healed  Instructed local care for skin care  Surgical shoe for off-loading  Monitor for any recurring would  Call if any increase in pain, fevers, calf pain, shortness of breath, or general distress is noted  Patient instructed to go to ER if call is not returned immediately  RA in 2 weeks  HISTORY OF PRESENT ILLNESS  Patient presents for post-op appointment  No drainage in the last 2 days  He has skin tear from tape  No edema, redness, or pain  REVIEW OF SYSTEMS  GENERAL: No fever or chills  HEART: No chest pain, or palpitation  RESPIRATORY:  No SOB or cough  GI: No Nausea, vomit or diarrhea  NEUROLOGIC: No syncope or acute weakness  MUSCULOSKELETAL: No calf pain or edema  PHYSICAL EXAMINATION  GENERAL  The patient appears in NAD / non-toxic  Afebrile  VSS    VASCULAR EXAM  Pedal pulses and vascular status are intact  No calf pain or edema bilaterally  No cyanosis  DERMATOLOGIC EXAM  Incision is healed left foot  Superficial skin tear on left dorsal foot  No redness, edema, or signs of infection  No warmth  No signs of abscess  No necrosis  NEUROLOGIC EXAM  AAO X 3  No focal neurologic deficit  Neurologic status is intact BLE  MUSCULOSKELETAL EXAM  ROM intact  No fluctuation or crepitus  English

## 2023-01-24 ENCOUNTER — OFFICE VISIT (OUTPATIENT)
Dept: PODIATRY | Facility: CLINIC | Age: 53
End: 2023-01-24

## 2023-01-24 VITALS — HEIGHT: 72 IN | WEIGHT: 264 LBS | BODY MASS INDEX: 35.76 KG/M2

## 2023-01-24 DIAGNOSIS — M10.372 GOUT OF LEFT FOOT DUE TO RENAL IMPAIRMENT, UNSPECIFIED CHRONICITY: ICD-10-CM

## 2023-01-24 DIAGNOSIS — Z79.4 TYPE 2 DIABETES MELLITUS WITH DIABETIC NEUROPATHY, WITH LONG-TERM CURRENT USE OF INSULIN (HCC): Primary | ICD-10-CM

## 2023-01-24 DIAGNOSIS — E11.40 TYPE 2 DIABETES MELLITUS WITH DIABETIC NEUROPATHY, WITH LONG-TERM CURRENT USE OF INSULIN (HCC): Primary | ICD-10-CM

## 2023-01-24 NOTE — PROGRESS NOTES
PATIENT:  Lauryn Price      1970        ASSESSMENT     1  Type 2 diabetes mellitus with diabetic neuropathy, with long-term current use of insulin ([facility] )        2  Gout of left foot due to renal impairment, unspecified chronicity  Uric acid              PLAN  1  Patient was counseled on the condition and diagnosis  Educated disease prevention and risks related to diabetes  2  Educated proper daily foot care and exam   Instructed proper skin care / protection and footwear  Instructed to identify any signs of infection and related foot problem  Continue diabetic shoes  3  The recent blood work was reviewed / discussed  Discussed proper blood glucose control with diet and exercise  4  Repeat Uric acid  Continue low purine diet  5  He has high risk diabetic foot and will return in 3 months for diabetic foot exam       HISTORY OF PRESENT ILLNESS  Patient presents for diabetic foot exam   He has high risk diabetic foot with history of DFU  No recurring ulcer in his foot  No signs of infection in both feet  He feels well with no foot pain  BS is under control          PAST MEDICAL HISTORY:  Past Medical History:   Diagnosis Date   • A-fib [facility])    • BPH (benign prostatic hyperplasia)    • CPAP (continuous positive airway pressure) dependence    • Diabetes mellitus ([facility] )    • GERD (gastroesophageal reflux disease)    • History of COVID-19 01/2022    mild s/s but went into a fib   • Hyperlipidemia    • Hypertension    • MRSA infection 2021    back cyst   • Sleep apnea    • Tailor's bunion of left foot        PAST SURGICAL HISTORY:  Past Surgical History:   Procedure Laterality Date   • CARDIAC CATHETERIZATION  2010    [facility]   • CARDIAC ELECTROPHYSIOLOGY STUDY AND ABLATION     • CARDIOVERSION     • CATARACT EXTRACTION Bilateral 07/2021   • CLOSURE DELAYED PRIMARY Left 5/1/2022    Procedure: CLOSURE DELAYED PRIMARY VAC REMOVED;  Surgeon: Leroy Camarena DPM;  [facility];  Service: Podiatry • COLONOSCOPY     • INCISION AND DRAINAGE OF WOUND Left 4/28/2022    Procedure: foot I&D with vac application; 5TH metatarsal bone biopsy;  Surgeon: Curtis Weiner DPM;  Location:  MAIN OR;  Service: Podiatry   • NOSE SURGERY     • TOE OSTEOTOMY Left 4/8/2022    Procedure: RESECTION LEFT 5TH METATARSAL;  Surgeon: Curtis Weiner DPM;  Location:  MAIN OR;  Service: Podiatry        ALLERGIES:  Patient has no known allergies  MEDICATIONS:  Current Outpatient Medications   Medication Sig Dispense Refill   • amLODIPine (NORVASC) 5 mg tablet Take 5 mg by mouth 2 (two) times a day     • atorvastatin (LIPITOR) 40 mg tablet TAKE 1 TABLET BY MOUTH EVERY DAY AT NIGHT     • B-D ULTRAFINE III SHORT PEN 31G X 8 MM MISC USE FOR 2 INJECTIONS PER DAY     • carvedilol (COREG) 25 mg tablet TAKE 2 TABLETS (50 MG TOTAL) BY MOUTH 2 (TWO) TIMES A DAY WITH MEALS  • chlorthalidone 25 mg tablet Take 50 mg by mouth daily     • Continuous Blood Gluc Sensor (FreeStyle Miah 2 Sensor) MISC      • fluticasone (FLONASE) 50 mcg/act nasal spray 2 sprays if needed       • furosemide (LASIX) 40 mg tablet as needed     • Lantus SoloStar 100 units/mL injection pen INJECT 45 UNTIS IN THE MORNING AND 80 UNTITS AT NIGHT     • Lyumjev KwikPen 100 UNIT/ML SOPN if needed 20 units for blood sugar spike      • metFORMIN (GLUCOPHAGE) 1000 MG tablet Take 1,000 mg by mouth 2 (two) times a day     • potassium chloride (K-DUR,KLOR-CON) 20 mEq tablet as needed With Lasix      • Pradaxa 150 MG capsu Take 150 mg by mouth 2 (two) times a day     • ramipril (ALTACE) 10 MG capsule TAKE 2 TABLETS (20 MG TOTAL) IN THE MORNING AND TAKE 1 TABLET (10 MG TOTAL) IN THE EVENING  • terazosin (HYTRIN) 2 mg capsule 4 mg 2 (two) times a day         No current facility-administered medications for this visit         SOCIAL HISTORY:  Social History     Socioeconomic History   • Marital status: /Civil Union     Spouse name: None   • Number of children: None   • Years of education: None   • Highest education level: None   Occupational History   • None   Tobacco Use   • Smoking status: Never   • Smokeless tobacco: Former     Types: Chew   Vaping Use   • Vaping Use: Never used   Substance and Sexual Activity   • Alcohol use: Yes     Comment: Rare   • Drug use: Never   • Sexual activity: None   Other Topics Concern   • None   Social History Narrative   • None     Social Determinants of Health     Financial Resource Strain: Not on file   Food Insecurity: No Food Insecurity   • Worried About Running Out of Food in the Last Year: Never true   • Ran Out of Food in the Last Year: Never true   Transportation Needs: No Transportation Needs   • Lack of Transportation (Medical): No   • Lack of Transportation (Non-Medical): No   Physical Activity: Not on file   Stress: Not on file   Social Connections: Not on file   Intimate Partner Violence: Not on file   Housing Stability: Unknown   • Unable to Pay for Housing in the Last Year: Not on file   • Number of Places Lived in the Last Year: 1   • Unstable Housing in the Last Year: No        REVIEW OF SYSTEMS  GENERAL: No fever or chills  HEART: No chest pain, or palpitation  RESPIRATORY:  No SOB or cough  GI: No Nausea, vomit or diarrhea  NEUROLOGIC: No syncope or acute weakness  MUSCULOSKELETAL: No calf pain or edema  PHYSICAL EXAMINATION  GENERAL  The patient appears in NAD / non-toxic  Afebrile  VSS    VASCULAR EXAM  Dorsalis pedis  +1  Posterior tibial artery  +2  There is minimal lower extremity edema bilaterally  No ischemia  NEUROLOGIC EXAM  AAo X 3  Sensation is intact to light touch  Sensation is decreased to 10gm monofilament  No focal neurologic deficit  DERMATOLOGIC EXAM:   No ulcer  No cellulitis or abscess noted  No notable skin lesion  MUSCULOSKELETAL EXAM:   No acute joint pain  No acute joint swelling or redness  No acute musculoskeletal problem  ROM intact  MMT intact          Risk Category:   3 = History of plantar ulceration

## 2023-07-18 ENCOUNTER — OFFICE VISIT (OUTPATIENT)
Dept: PODIATRY | Facility: CLINIC | Age: 53
End: 2023-07-18
Payer: COMMERCIAL

## 2023-07-18 VITALS
WEIGHT: 267 LBS | HEIGHT: 72 IN | BODY MASS INDEX: 36.16 KG/M2 | DIASTOLIC BLOOD PRESSURE: 99 MMHG | SYSTOLIC BLOOD PRESSURE: 163 MMHG | HEART RATE: 65 BPM

## 2023-07-18 DIAGNOSIS — E11.40 TYPE 2 DIABETES MELLITUS WITH DIABETIC NEUROPATHY, WITH LONG-TERM CURRENT USE OF INSULIN (HCC): Primary | ICD-10-CM

## 2023-07-18 DIAGNOSIS — Z86.31 PERSONAL HISTORY OF DIABETIC FOOT ULCER: ICD-10-CM

## 2023-07-18 DIAGNOSIS — Z79.4 TYPE 2 DIABETES MELLITUS WITH DIABETIC NEUROPATHY, WITH LONG-TERM CURRENT USE OF INSULIN (HCC): Primary | ICD-10-CM

## 2023-07-18 PROCEDURE — 99213 OFFICE O/P EST LOW 20 MIN: CPT | Performed by: PODIATRIST

## 2023-07-18 NOTE — PROGRESS NOTES
PATIENT:  Cathryn Ochoa      1970        ASSESSMENT     1. Type 2 diabetes mellitus with diabetic neuropathy, with long-term current use of insulin ([facility])  Diabetic Shoe    Diabetic Shoe Inserts      2. Personal history of diabetic foot ulcer  Diabetic Shoe    Diabetic Shoe Inserts              PLAN  1. Patient was counseled on the condition and diagnosis. Educated disease prevention and risks related to diabetes. 2. Educated proper daily foot care and exam.  Instructed proper skin care / protection and footwear. Instructed to identify any signs of infection and related foot problem. 3. Reviewed the blood work. His uric acid was 7.2. Continue low purine diet. HbA1c was 7.7 and instructed management of BS with diet and exercise. 4. Referred him for new diabetic shoes and inserts. 5. He has high risk diabetic foot and will return in 3 months for diabetic foot exam.      HISTORY OF PRESENT ILLNESS  Patient presents for diabetic foot exam.  He has high risk diabetic foot with history of DFU. No recurring ulcer in his foot. No signs of infection in both feet. He feels well with no foot pain. No acute pedal problems. BS is under control.         PAST MEDICAL HISTORY:  Past Medical History:   Diagnosis Date   • A-fib ([address])    • BPH (benign prostatic hyperplasia)    • CPAP (continuous positive airway pressure) dependence    • Diabetes mellitus ([facility])    • GERD (gastroesophageal reflux disease)    • History of COVID-19 01/2022    mild s/s but went into a fib   • Hyperlipidemia    • Hypertension    • MRSA infection 2021    back cyst   • Sleep apnea    • Tailor's bunion of left foot        PAST SURGICAL HISTORY:  Past Surgical History:   Procedure Laterality Date   • CARDIAC CATHETERIZATION  2010    [facility]   • CARDIAC ELECTROPHYSIOLOGY STUDY AND ABLATION     • CARDIOVERSION     • CATARACT EXTRACTION Bilateral 07/2021   • CLOSURE DELAYED PRIMARY Left 5/1/2022    Procedure: CLOSURE DELAYED PRIMARY VAC REMOVED;  Surgeon: Melanie Orozco DPM;  [facility];  Service: Podiatry   • COLONOSCOPY     • INCISION AND DRAINAGE OF WOUND Left 4/28/2022    Procedure: foot I&D with vac application; 5TH metatarsal bone biopsy;  Surgeon: Melanie Orozco DPM;  [facility];  Service: Podiatry   • NOSE SURGERY     • TOE OSTEOTOMY Left 4/8/2022    Procedure: RESECTION LEFT 5TH METATARSAL;  Surgeon: Melanie Orozco DPM;  [facility];  Service: Podiatry        ALLERGIES:  Patient has no known allergies. MEDICATIONS:  Current Outpatient Medications   Medication Sig Dispense Refill   • amLODIPine (NORVASC) 5 mg tablet Take 5 mg by mouth 2 (two) times a day     • atorvastatin (LIPITOR) 40 mg tablet TAKE 1 TABLET BY MOUTH EVERY DAY AT NIGHT     • B-D ULTRAFINE III SHORT PEN 31G X 8 MM MISC USE FOR 2 INJECTIONS PER DAY     • BD Pen Needle Joselin 2nd Gen 32G X 4 MM MISC 5 (five) times a day     • carvedilol (COREG) 25 mg tablet TAKE 2 TABLETS (50 MG TOTAL) BY MOUTH 2 (TWO) TIMES A DAY WITH MEALS. • chlorthalidone 25 mg tablet Take 50 mg by mouth daily     • Continuous Blood Gluc Sensor (FreeStyle Miah 2 Sensor) MISC      • fluticasone (FLONASE) 50 mcg/act nasal spray 2 sprays if needed       • furosemide (LASIX) 40 mg tablet as needed     • Lantus SoloStar 100 units/mL injection pen INJECT 45 UNTIS IN THE MORNING AND 80 UNTITS AT NIGHT     • Lyumjev KwikPen 100 UNIT/ML SOPN if needed 20 units for blood sugar spike      • metFORMIN (GLUCOPHAGE) 1000 MG tablet Take 1,000 mg by mouth 2 (two) times a day     • potassium chloride (K-DUR,KLOR-CON) 20 mEq tablet as needed With Lasix      • Pradaxa 150 MG capsu Take 150 mg by mouth 2 (two) times a day     • ramipril (ALTACE) 10 MG capsule TAKE 2 TABLETS (20 MG TOTAL) IN THE MORNING AND TAKE 1 TABLET (10 MG TOTAL) IN THE EVENING. • spironolactone (ALDACTONE) 25 mg tablet TAKE 1 TABLET BY MOUTH 1 TIME EACH DAY.      • terazosin (HYTRIN) 2 mg capsule 4 mg 2 (two) times a day No current facility-administered medications for this visit. SOCIAL HISTORY:  Social History     Socioeconomic History   • Marital status: /Civil Union     Spouse name: None   • Number of children: None   • Years of education: None   • Highest education level: None   Occupational History   • None   Tobacco Use   • Smoking status: Never   • Smokeless tobacco: Former     Types: Chew   Vaping Use   • Vaping Use: Never used   Substance and Sexual Activity   • Alcohol use: Yes     Comment: Rare   • Drug use: Never   • Sexual activity: None   Other Topics Concern   • None   Social History Narrative   • None     Social Determinants of Health     Financial Resource Strain: Not on file   Food Insecurity: No Food Insecurity (4/30/2022)    Hunger Vital Sign    • Worried About Running Out of Food in the Last Year: Never true    • Ran Out of Food in the Last Year: Never true   Transportation Needs: No Transportation Needs (4/30/2022)    PRAPARE - Transportation    • Lack of Transportation (Medical): No    • Lack of Transportation (Non-Medical): No   Physical Activity: Not on file   Stress: Not on file   Social Connections: Not on file   Intimate Partner Violence: Not on file   Housing Stability: Unknown (4/30/2022)    Housing Stability Vital Sign    • Unable to Pay for Housing in the Last Year: Not on file    • Number of Places Lived in the Last Year: 1    • Unstable Housing in the Last Year: No        REVIEW OF SYSTEMS  GENERAL: No fever or chills. HEART: No chest pain, or palpitation  RESPIRATORY:  No SOB or cough  GI: No Nausea, vomit or diarrhea  NEUROLOGIC: No syncope or acute weakness  MUSCULOSKELETAL: No calf pain or edema. PHYSICAL EXAMINATION  GENERAL  The patient appears in NAD / non-toxic. Afebrile. VSS    VASCULAR EXAM  Dorsalis pedis  +1. Posterior tibial artery  +2. There is minimal lower extremity edema bilaterally. No ischemia. NEUROLOGIC EXAM  AAo X 3.   Sensation is intact to light touch. Sensation is decreased to 10gm monofilament. No focal neurologic deficit. DERMATOLOGIC EXAM:   No ulcer. No cellulitis or abscess noted. No notable skin lesion. No rash. MUSCULOSKELETAL EXAM:   No acute joint pain. No acute joint swelling or redness. No acute musculoskeletal problem. ROM intact. MMT intact. Risk Category:   3 = History of plantar ulceration.

## 2023-10-24 ENCOUNTER — OFFICE VISIT (OUTPATIENT)
Dept: PODIATRY | Facility: CLINIC | Age: 53
End: 2023-10-24
Payer: COMMERCIAL

## 2023-10-24 VITALS — DIASTOLIC BLOOD PRESSURE: 67 MMHG | HEART RATE: 116 BPM | SYSTOLIC BLOOD PRESSURE: 195 MMHG

## 2023-10-24 DIAGNOSIS — Z86.31 PERSONAL HISTORY OF DIABETIC FOOT ULCER: ICD-10-CM

## 2023-10-24 DIAGNOSIS — Z79.4 TYPE 2 DIABETES MELLITUS WITH DIABETIC NEUROPATHY, WITH LONG-TERM CURRENT USE OF INSULIN (HCC): Primary | ICD-10-CM

## 2023-10-24 DIAGNOSIS — E11.40 TYPE 2 DIABETES MELLITUS WITH DIABETIC NEUROPATHY, WITH LONG-TERM CURRENT USE OF INSULIN (HCC): Primary | ICD-10-CM

## 2023-10-24 PROCEDURE — 99213 OFFICE O/P EST LOW 20 MIN: CPT | Performed by: PODIATRIST

## 2023-10-24 NOTE — PROGRESS NOTES
PATIENT:  Dino Barber      1970        ASSESSMENT     1. Type 2 diabetes mellitus with diabetic neuropathy, with long-term current use of insulin ([address])        2. Personal history of diabetic foot ulcer              PLAN  1. Patient was counseled on the condition and diagnosis. Educated disease prevention and risks related to diabetes. 2. Educated proper daily foot care and exam.  Instructed proper skin care / protection and footwear. Instructed to identify any signs of infection and related foot problem. 3. Reviewed the blood work. HbA1c was 7.7 and instructed management of BS with diet and exercise. 4. Awaits new diabetic shoes and inserts. 5. He has high risk diabetic foot and will return in 3 months for diabetic foot exam.      HISTORY OF PRESENT ILLNESS  Patient presents for diabetic foot exam.  He has high risk diabetic foot with history of DFU. No recurring ulcer in his foot. No signs of infection in both feet. No acute pedal problems. BS is under control.         PAST MEDICAL HISTORY:  Past Medical History:   Diagnosis Date    A-fib [facility])     BPH (benign prostatic hyperplasia)     CPAP (continuous positive airway pressure) dependence     Diabetes mellitus (HCC)     GERD (gastroesophageal reflux disease)     History of COVID-19 01/2022    mild s/s but went into a fib    Hyperlipidemia     Hypertension     MRSA infection 2021    back cyst    Sleep apnea     Tailor's bunion of left foot        PAST SURGICAL HISTORY:  Past Surgical History:   Procedure Laterality Date    CARDIAC CATHETERIZATION  2010    [facility]    CARDIAC ELECTROPHYSIOLOGY STUDY AND ABLATION      CARDIOVERSION      CATARACT EXTRACTION Bilateral 07/2021    CLOSURE DELAYED PRIMARY Left 5/1/2022    Procedure: CLOSURE DELAYED PRIMARY VAC REMOVED;  Surgeon: Tra Agarwal DPM;  Location: [facility];  Service: Podiatry    COLONOSCOPY      INCISION AND DRAINAGE OF WOUND Left 4/28/2022    Procedure: foot I&D with vac application; 5TH metatarsal bone biopsy;  Surgeon: Tanesha Barrios DPM;  Location:  MAIN OR;  Service: Podiatry    NOSE SURGERY      TOE OSTEOTOMY Left 4/8/2022    Procedure: RESECTION LEFT 5TH METATARSAL;  Surgeon: Tanesha Barrios DPM;  Location:  MAIN OR;  Service: Podiatry        ALLERGIES:  Patient has no known allergies. MEDICATIONS:  Current Outpatient Medications   Medication Sig Dispense Refill    amLODIPine (NORVASC) 5 mg tablet Take 5 mg by mouth 2 (two) times a day      atorvastatin (LIPITOR) 40 mg tablet TAKE 1 TABLET BY MOUTH EVERY DAY AT NIGHT      B-D ULTRAFINE III SHORT PEN 31G X 8 MM MISC USE FOR 2 INJECTIONS PER DAY      BD Pen Needle Joselin 2nd Gen 32G X 4 MM MISC 5 (five) times a day      carvedilol (COREG) 25 mg tablet TAKE 2 TABLETS (50 MG TOTAL) BY MOUTH 2 (TWO) TIMES A DAY WITH MEALS.      chlorthalidone 25 mg tablet Take 50 mg by mouth daily      Continuous Blood Gluc Sensor (FreeStyle Miah 2 Sensor) MISC       fluticasone (FLONASE) 50 mcg/act nasal spray 2 sprays if needed        furosemide (LASIX) 40 mg tablet as needed      Lantus SoloStar 100 units/mL injection pen INJECT 45 UNTIS IN THE MORNING AND 80 UNTITS AT NIGHT      Lyumjev KwikPen 100 UNIT/ML SOPN if needed 20 units for blood sugar spike       metFORMIN (GLUCOPHAGE) 1000 MG tablet Take 1,000 mg by mouth 2 (two) times a day      potassium chloride (K-DUR,KLOR-CON) 20 mEq tablet as needed With Lasix       Pradaxa 150 MG capsu Take 150 mg by mouth 2 (two) times a day      ramipril (ALTACE) 10 MG capsule TAKE 2 TABLETS (20 MG TOTAL) IN THE MORNING AND TAKE 1 TABLET (10 MG TOTAL) IN THE EVENING. spironolactone (ALDACTONE) 25 mg tablet TAKE 1 TABLET BY MOUTH 1 TIME EACH DAY. terazosin (HYTRIN) 2 mg capsule 4 mg 2 (two) times a day         No current facility-administered medications for this visit.        SOCIAL HISTORY:  Social History     Socioeconomic History    Marital status: /Civil Union     Spouse name: None    Number of children: None    Years of education: None    Highest education level: None   Occupational History    None   Tobacco Use    Smoking status: Never    Smokeless tobacco: Former     Types: Chew   Vaping Use    Vaping Use: Never used   Substance and Sexual Activity    Alcohol use: Yes     Comment: Rare    Drug use: Never    Sexual activity: None   Other Topics Concern    None   Social History Narrative    None     Social Determinants of Health     Financial Resource Strain: Not on file   Food Insecurity: No Food Insecurity (4/30/2022)    Hunger Vital Sign     Worried About Running Out of Food in the Last Year: Never true     Ran Out of Food in the Last Year: Never true   Transportation Needs: No Transportation Needs (4/30/2022)    PRAPARE - Transportation     Lack of Transportation (Medical): No     Lack of Transportation (Non-Medical): No   Physical Activity: Not on file   Stress: Not on file   Social Connections: Not on file   Intimate Partner Violence: Not on file   Housing Stability: Unknown (4/30/2022)    Housing Stability Vital Sign     Unable to Pay for Housing in the Last Year: Not on file     Number of Places Lived in the Last Year: 1     Unstable Housing in the Last Year: No        REVIEW OF SYSTEMS  GENERAL: No fever or chills. HEART: No chest pain, or palpitation  RESPIRATORY:  No SOB or cough  GI: No Nausea, vomit or diarrhea  NEUROLOGIC: No syncope or acute weakness  MUSCULOSKELETAL: No calf pain or edema. PHYSICAL EXAMINATION  GENERAL  The patient appears in NAD / non-toxic. Afebrile. VSS    VASCULAR EXAM  Dorsalis pedis  +1. Posterior tibial artery  +2. There is minimal lower extremity edema bilaterally. No ischemia. NEUROLOGIC EXAM  AAo X 3. Sensation is intact to light touch. Sensation is decreased to 10gm monofilament. No focal neurologic deficit. DERMATOLOGIC EXAM:   No ulcer. No cellulitis or abscess noted. No notable skin lesion. No rash.       MUSCULOSKELETAL EXAM:   No acute joint pain. No acute joint swelling or redness. No acute musculoskeletal problem. ROM intact. MMT intact. Risk Category:   3 = History of plantar ulceration.

## 2024-07-18 ENCOUNTER — TELEPHONE (OUTPATIENT)
Dept: GASTROENTEROLOGY | Facility: CLINIC | Age: 54
End: 2024-07-18

## 2024-07-18 NOTE — TELEPHONE ENCOUNTER
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 Called and spoke to pt whom informed he already had colonoscopy done prior to his bariatric surgery.

## 2024-09-03 ENCOUNTER — OFFICE VISIT (OUTPATIENT)
Dept: PODIATRY | Facility: CLINIC | Age: 54
End: 2024-09-03
Payer: COMMERCIAL

## 2024-09-03 VITALS
HEIGHT: 72 IN | WEIGHT: 267 LBS | SYSTOLIC BLOOD PRESSURE: 175 MMHG | BODY MASS INDEX: 36.16 KG/M2 | HEART RATE: 50 BPM | DIASTOLIC BLOOD PRESSURE: 100 MMHG

## 2024-09-03 DIAGNOSIS — Z86.31 PERSONAL HISTORY OF DIABETIC FOOT ULCER: ICD-10-CM

## 2024-09-03 DIAGNOSIS — E11.40 TYPE 2 DIABETES MELLITUS WITH DIABETIC NEUROPATHY, WITH LONG-TERM CURRENT USE OF INSULIN (HCC): Primary | ICD-10-CM

## 2024-09-03 DIAGNOSIS — Z79.4 TYPE 2 DIABETES MELLITUS WITH DIABETIC NEUROPATHY, WITH LONG-TERM CURRENT USE OF INSULIN (HCC): Primary | ICD-10-CM

## 2024-09-03 PROCEDURE — 99213 OFFICE O/P EST LOW 20 MIN: CPT | Performed by: PODIATRIST

## 2024-09-03 NOTE — PATIENT INSTRUCTIONS
"Patient Education     Foot care for people with diabetes   The Basics   Written by the doctors and editors at [facility]   Why is foot care important if I have diabetes? -- Diabetes can cause nerve damage if your blood sugar is high for a long time. The medical term for this is \"diabetic neuropathy.\"  If you have problems with the nerves in your feet, you might not be able to feel pain in your foot. Normally, people feel pain when they get a cut or a blister on their foot. The pain tells them that they need to treat their cut so it can heal. But people with nerve damage might not feel any pain when their feet get hurt. They might not even know that they have a cut, so they might not treat it. Problems that aren't treated right away can get much worse. For example, an untreated cut can get infected and turn into an open sore.  High blood sugar can also damage blood vessels and decrease blood flow to your feet. This can weaken your skin and make wounds take longer to heal. You are also more likely to get an infection if you have high blood sugar.  How do I take care of my feet? -- Taking good care of your feet can help prevent foot problems. You should:   Wash your feet every day with soap and warm water. Pat your feet dry, and be sure to dry the skin between your toes.   Keep your feet moisturized. Put lotion on the tops and bottoms of your feet, but not between your toes.   Check your feet every day (figure 1). Look for cuts, blisters, redness, or swelling. Use a mirror, or ask someone to help you check the bottoms of your feet. Check all parts of the foot, especially between the toes. Look for broken skin, ulcers, blisters, or redness.   Trim your toenails straight across when needed (figure 2). Do not cut the corners of your toenails. File rough edges. Do not cut your cuticles. Ask for help if you cannot see well or have problems reaching your feet.   Ask your doctor or nurse to check your feet at each visit. Take " your shoes and socks off for these checks.   See a foot care provider (such as a podiatrist) if you have an ingrown toenail, corn, or callus. Do not try to remove corns and calluses yourself.  How do I protect my feet from injury? -- There are several ways to protect your feet. You can:   Wear shoes and socks at all times, even at home. Do not walk barefoot. Wear swim shoes if you go to the beach or a swimming pool.   Choose shoes that fit well. They should not be not too tight or too loose. Your shoes should have plenty of room for your toes (figure 3). Your doctor might give you a prescription for special shoes. Check to see if they are covered by your insurance.   Check your shoes each time before you put them on to make sure that the lining is smooth. Also check to make sure that there is nothing inside the shoes before putting them on.   Do not wear shoes that expose any part of the foot, like sandals, thongs, or clogs.   Wear cotton socks that fit loosely. Do not wear shoes without socks.   Protect your feet from heat and cold. Test bath water before putting your feet in it to make sure that it is not too hot. Do not walk barefoot on hot ground. Take extra care when going outside in the cold and wear warm socks.  What else should I know? -- You can lower your risk for foot problems by keeping your blood sugar levels as close to your goal as possible. Other things you can do include:   Move your ankles and toes often to help with blood flow. You can wear a support stocking to help with swelling.   Walk often. Regular walking helps blood flow.   If you smoke, try to quit. Your doctor or nurse can help. Smoking causes poor blood flow to your feet and can damage your nerves.  When should I call the doctor? -- Call your doctor or nurse for advice if you have:   A fever of 100.4°F (38°C) or higher, chills, or a wound that will not heal   Swelling, redness, warmth around a wound, a foul smell coming from a wound, or  yellowish, greenish, or bloody discharge   Sores or blisters on your feet that hurt more or less than you would expect   Numbness or tingling in your foot or leg   Corns, calluses, blisters, or new sores on your foot   Very dry, scaly, or cracked skin on your feet   Changes in the way your foot joints or arch look  All topics are updated as new evidence becomes available and our peer review process is complete.  This topic retrieved from [employer] on: Mar 13, 2024.  Topic 783667 Version 2.0  Release: 32.2.4 - C32.71  © 2024 UpToDate, Inc. and/or its affiliates. All rights reserved.  figure 1: Foot check for people with diabetes     People with diabetes should check both of their feet every day. It is important to check your feet all over, including in between your toes. If you can't see the bottom of your foot, use a mirror or ask another person to check for you. Let your doctor or nurse know if you find any:  Redness   Cuts or cracks in the skin   Blisters   Swelling   Graphic 83994 Version 3.0  figure 2: Trim your toenails     Trim your toenails straight across and smooth them with a nail file.  Graphic 92428 Version 2.0  figure 3: Correct shoe shape     Choose shoes that fit the right way and are not too tight or too loose. Your shoes should have plenty of room for your toes.  Graphic 02887 Version 2.0  Consumer Information Use and Disclaimer   Disclaimer: This generalized information is a limited summary of diagnosis, treatment, and/or medication information. It is not meant to be comprehensive and should be used as a tool to help the user understand and/or assess potential diagnostic and treatment options. It does NOT include all information about conditions, treatments, medications, side effects, or risks that may apply to a specific patient. It is not intended to be medical advice or a substitute for the medical advice, diagnosis, or treatment of a health care provider based on the health care provider's  examination and assessment of a patient's specific and unique circumstances. Patients must speak with a health care provider for complete information about their health, medical questions, and treatment options, including any risks or benefits regarding use of medications. This information does not endorse any treatments or medications as safe, effective, or approved for treating a specific patient. UpToDate, Inc. and its affiliates disclaim any warranty or liability relating to this information or the use thereof.The use of this information is governed by the Terms of Use, available at https://www.woltersarviem AGuwer.com/en/know/clinical-effectiveness-terms. 2024© UpToDate, Inc. and its affiliates and/or licensors. All rights reserved.  Copyright   © 2024 UpToDate, Inc. and/or its affiliates. All rights reserved.

## 2024-09-03 NOTE — PROGRESS NOTES
PATIENT:  Hari Mendez      1970        ASSESSMENT     1. Type 2 diabetes mellitus with diabetic neuropathy, with long-term current use of insulin (HCC)  Diabetic Shoe Inserts    Diabetic Shoe      2. Personal history of diabetic foot ulcer  Diabetic Shoe Inserts    Diabetic Shoe              PLAN  1. Reviewed medical records.  Patient was counseled on the condition and diagnosis.  Educated disease prevention and risks related to diabetes.    2. Educated proper daily foot care and exam.  Instructed proper skin care / protection and footwear.  Instructed to identify any signs of infection and related foot problem.    3. Reviewed the blood work.  HbA1c was 5.9 and instructed management of BS with diet and exercise.  4. He has high risk diabetic foot with history of DFU and referred him for new DM shoes.  5. He will return in 6 months for diabetic foot exam.      HISTORY OF PRESENT ILLNESS  Patient presents for diabetic foot exam.  He has high risk diabetic foot with history of DFU.  His BS is under control.  He had weight loss surgery in May.  He is off diabetic medicine now.  He feels well in general.  No recurring ulcer in his foot.  No signs of infection in both feet.  No acute pedal problems.          PAST MEDICAL HISTORY:  Past Medical History:   Diagnosis Date    A-fib (HCC)     BPH (benign prostatic hyperplasia)     CPAP (continuous positive airway pressure) dependence     Diabetes mellitus (HCC)     GERD (gastroesophageal reflux disease)     History of COVID-19 01/2022    mild s/s but went into a fib    Hyperlipidemia     Hypertension     MRSA infection 2021    back cyst    Sleep apnea     Tailor's bunion of left foot        PAST SURGICAL HISTORY:  Past Surgical History:   Procedure Laterality Date    CARDIAC CATHETERIZATION  2010    [facility]    CARDIAC ELECTROPHYSIOLOGY STUDY AND ABLATION      CARDIOVERSION      CATARACT EXTRACTION Bilateral 07/2021    CLOSURE DELAYED PRIMARY Left 5/1/2022     Procedure: CLOSURE DELAYED PRIMARY VAC REMOVED;  Surgeon: Ralf King DPM;  Location: [facility];  Service: Podiatry    COLONOSCOPY      INCISION AND DRAINAGE OF WOUND Left 4/28/2022    Procedure: foot I&D with vac application; 5TH metatarsal bone biopsy;  Surgeon: Ralf King DPM;  Location[facility];  Service: Podiatry    NOSE SURGERY      TOE OSTEOTOMY Left 4/8/2022    Procedure: RESECTION LEFT 5TH METATARSAL;  Surgeon: Ralf King DPM;  Location[facility];  Service: Podiatry        ALLERGIES:  Patient has no known allergies.    MEDICATIONS:  Current Outpatient Medications   Medication Sig Dispense Refill    amLODIPine (NORVASC) 5 mg tablet Take 5 mg by mouth 2 (two) times a day      atorvastatin (LIPITOR) 40 mg tablet TAKE 1 TABLET BY MOUTH EVERY DAY AT NIGHT      B-D ULTRAFINE III SHORT PEN 31G X 8 MM MISC USE FOR 2 INJECTIONS PER DAY      BD Pen Needle Joselin 2nd Gen 32G X 4 MM MISC 5 (five) times a day      carvedilol (COREG) 25 mg tablet TAKE 2 TABLETS (50 MG TOTAL) BY MOUTH 2 (TWO) TIMES A DAY WITH MEALS.      chlorthalidone 25 mg tablet Take 50 mg by mouth daily      Continuous Blood Gluc Sensor (FreeStyle Miah 2 Sensor) MISC       fluticasone (FLONASE) 50 mcg/act nasal spray 2 sprays if needed        furosemide (LASIX) 40 mg tablet as needed      Lantus SoloStar 100 units/mL injection pen INJECT 45 UNTIS IN THE MORNING AND 80 UNTITS AT NIGHT      Lyumjev KwikPen 100 UNIT/ML SOPN if needed 20 units for blood sugar spike       metFORMIN (GLUCOPHAGE) 1000 MG tablet Take 1,000 mg by mouth 2 (two) times a day      potassium chloride (K-DUR,KLOR-CON) 20 mEq tablet as needed With Lasix       Pradaxa 150 MG capsu Take 150 mg by mouth 2 (two) times a day      ramipril (ALTACE) 10 MG capsule TAKE 2 TABLETS (20 MG TOTAL) IN THE MORNING AND TAKE 1 TABLET (10 MG TOTAL) IN THE EVENING.      spironolactone (ALDACTONE) 25 mg tablet TAKE 1 TABLET BY MOUTH 1 TIME EACH DAY.      terazosin (HYTRIN) 2 mg capsule 4 mg 2 (two)  times a day         No current facility-administered medications for this visit.       SOCIAL HISTORY:  Social History     Socioeconomic History    Marital status: /Civil Union     Spouse name: None    Number of children: None    Years of education: None    Highest education level: None   Occupational History    None   Tobacco Use    Smoking status: Never    Smokeless tobacco: Former     Types: Chew   Vaping Use    Vaping status: Never Used   Substance and Sexual Activity    Alcohol use: Yes     Comment: Rare    Drug use: Never    Sexual activity: None   Other Topics Concern    None   Social History Narrative    None     Social Determinants of Health     Financial Resource Strain: Patient Declined (5/28/2024)    Received from [facility]    Overall Financial Resource Strain (CARDIA)     Difficulty of Paying Living Expenses: Patient declined   Food Insecurity: No Food Insecurity (5/28/2024)    Received from [facility]    Hunger Vital Sign     Worried About Running Out of Food in the Last Year: Never true     Ran Out of Food in the Last Year: Never true   Transportation Needs: No Transportation Needs (5/28/2024)    Received from [facility]    PRAPARE - Transportation     Lack of Transportation (Medical): No     Lack of Transportation (Non-Medical): No   Physical Activity: Not on file   Stress: Not on file   Social Connections: Not on file   Intimate Partner Violence: Not At Risk (5/28/2024)    Received from [facility]    Humiliation, Afraid, Rape, and Kick questionnaire     Fear of Current or Ex-Partner: No     Emotionally Abused: No     Physically Abused: No     Sexually Abused: No   Housing Stability: Unknown (4/30/2022)    Housing Stability Vital Sign     Unable to Pay for Housing in the Last Year: Not on file     Number of Places Lived in the Last Year: 1     Unstable Housing in the Last Year: No        REVIEW OF SYSTEMS  GENERAL: No  fever or chills.    HEART: No chest pain, or palpitation  RESPIRATORY:  No SOB or cough  GI: No Nausea, vomit or diarrhea  NEUROLOGIC: No syncope or acute weakness  MUSCULOSKELETAL: No calf pain or edema.      PHYSICAL EXAMINATION  GENERAL  The patient appears in NAD / non-toxic. Afebrile. VSS    VASCULAR EXAM  Dorsalis pedis  +2.  Posterior tibial artery  +2.  There is minimal lower extremity edema bilaterally.  No ischemia.      NEUROLOGIC EXAM  AAo X 3.  Sensation is intact to light touch.  Sensation is intact to 10gm monofilament.  No focal neurologic deficit.          DERMATOLOGIC EXAM:   No ulcer.  No cellulitis or abscess noted.  No notable skin lesion.  No rash.      MUSCULOSKELETAL EXAM:   No acute joint pain.  No acute joint swelling or redness.  No acute musculoskeletal problem.  ROM intact.  MMT intact.          Diabetic Foot Exam    Patient's shoes and socks removed.    Right Foot/Ankle   Right Foot Inspection  Skin Exam: skin intact. No erythema, no maceration, no pre-ulcer and no ulcer.     Toe Exam: right toe deformity. No swelling, no tenderness and erythema    Sensory   Proprioception: intact  Monofilament testing: intact    Vascular  Capillary refills: < 3 seconds  The right DP pulse is 2+. The right PT pulse is 2+.     Left Foot/Ankle  Left Foot Inspection  Skin Exam: skin intact. No erythema, no maceration, no pre-ulcer and no ulcer.     Toe Exam: left toe deformity. No swelling, no tenderness and no erythema.     Sensory   Proprioception: intact  Monofilament testing: intact    Vascular  Capillary refills: < 3 seconds  The left DP pulse is 2+. The left PT pulse is 2+.     Assign Risk Category  Deformity present  No loss of protective sensation  No weak pulses  Risk: 3

## 2024-09-17 ENCOUNTER — TELEPHONE (OUTPATIENT)
Age: 54
End: 2024-09-17

## 2024-09-17 NOTE — TELEPHONE ENCOUNTER
Caller: Kedar Mendez    Doctor: Fernando Veloz    Reason for call: Kedar needs a list of places where he can get his orthotics made.  Can someone please reach out to him with that info?  Thank you.     Call back#: [phone]

## 2024-10-23 NOTE — TELEPHONE ENCOUNTER
Problem: Prexisting or High Potential for Compromised Skin Integrity  Goal: Skin integrity is maintained or improved  Description: INTERVENTIONS:  - Identify patients at risk for skin breakdown  - Assess and monitor skin integrity  - Assess and monitor nutrition and hydration status  - Monitor labs   - Assess for incontinence   - Turn and reposition patient  - Assist with mobility/ambulation  - Relieve pressure over bony prominences  - Avoid friction and shearing  - Provide appropriate hygiene as needed including keeping skin clean and dry  - Evaluate need for skin moisturizer/barrier cream  - Collaborate with interdisciplinary team   - Patient/family teaching  - Consider wound care consult   Outcome: Progressing     Problem: PAIN - ADULT  Goal: Verbalizes/displays adequate comfort level or baseline comfort level  Description: Interventions:  - Encourage patient to monitor pain and request assistance  - Assess pain using appropriate pain scale  - Administer analgesics based on type and severity of pain and evaluate response  - Implement non-pharmacological measures as appropriate and evaluate response  - Consider cultural and social influences on pain and pain management  - Notify physician/advanced practitioner if interventions unsuccessful or patient reports new pain  Outcome: Progressing     Problem: INFECTION - ADULT  Goal: Absence or prevention of progression during hospitalization  Description: INTERVENTIONS:  - Assess and monitor for signs and symptoms of infection  - Monitor lab/diagnostic results  - Monitor all insertion sites, i.e. indwelling lines, tubes, and drains  - Monitor endotracheal if appropriate and nasal secretions for changes in amount and color  - [place] appropriate cooling/warming therapies per order  - Administer medications as ordered  - Instruct and encourage patient and family to use good hand hygiene technique  - Identify and instruct in appropriate isolation precautions for  Tried sending email, could not go through with our printer in [facility]  Called pt and let him know I will send it tomorrow from [facility] office in the morning  He understood  identified infection/condition  Outcome: Progressing  Goal: Absence of fever/infection during neutropenic period  Description: INTERVENTIONS:  - Monitor WBC    Outcome: Progressing     Problem: SAFETY ADULT  Goal: Patient will remain free of falls  Description: INTERVENTIONS:  - Educate patient/family on patient safety including physical limitations  - Instruct patient to call for assistance with activity   - Consult OT/PT to assist with strengthening/mobility   - Keep Call bell within reach  - Keep bed low and locked with side rails adjusted as appropriate  - Keep care items and personal belongings within reach  - Initiate and maintain comfort rounds  - Make Fall Risk Sign visible to staff  - Offer Toileting every 2 Hours, in advance of need  - Initiate/Maintain bed alarm  - Obtain necessary fall risk management equipment: bed alarm  - Apply yellow socks and bracelet for high fall risk patients  - Consider moving patient to room near nurses station  Outcome: Progressing  Goal: Maintain or return to baseline ADL function  Description: INTERVENTIONS:  -  Assess patient's ability to carry out ADLs; assess patient's baseline for ADL function and identify physical deficits which impact ability to perform ADLs (bathing, care of mouth/teeth, toileting, grooming, dressing, etc.)  - Assess/evaluate cause of self-care deficits   - Assess range of motion  - Assess patient's mobility; develop plan if impaired  - Assess patient's need for assistive devices and provide as appropriate  - Encourage maximum independence but intervene and supervise when necessary  - Involve family in performance of ADLs  - Assess for home care needs following discharge   - Consider OT consult to assist with ADL evaluation and planning for discharge  - Provide patient education as appropriate  Outcome: Progressing  Goal: Maintains/Returns to pre admission functional level  Description: INTERVENTIONS:  - Perform AM-PAC 6 Click Basic Mobility/ Daily  Activity assessment daily.  - Set and communicate daily mobility goal to care team and patient/family/caregiver.   - Collaborate with rehabilitation services on mobility goals if consulted  - Perform Range of Motion 4 times a day.  - Reposition patient every 2 hours.  - Dangle patient 3 times a day  - Stand patient 3 times a day  - Ambulate patient 3 times a day  - Out of bed to chair 3 times a day   - Out of bed for meals 3 times a day  - Out of bed for toileting  - Record patient progress and toleration of activity level   Outcome: Progressing     Problem: DISCHARGE PLANNING  Goal: Discharge to home or other facility with appropriate resources  Description: INTERVENTIONS:  - Identify barriers to discharge w/patient and caregiver  - Arrange for needed discharge resources and transportation as appropriate  - Identify discharge learning needs (meds, wound care, etc.)  - Arrange for interpretive services to assist at discharge as needed  - Refer to Case Management Department for coordinating discharge planning if the patient needs post-hospital services based on physician/advanced practitioner order or complex needs related to functional status, cognitive ability, or social support system  Outcome: Progressing     Problem: Knowledge Deficit  Goal: Patient/family/caregiver demonstrates understanding of disease process, treatment plan, medications, and discharge instructions  Description: Complete learning assessment and assess knowledge base.  Interventions:  - Provide teaching at level of understanding  - Provide teaching via preferred learning methods  Outcome: Progressing     Problem: CARDIOVASCULAR - ADULT  Goal: Maintains optimal cardiac output and hemodynamic stability  Description: INTERVENTIONS:  - Monitor I/O, vital signs and rhythm  - Monitor for S/S and trends of decreased cardiac output  - Administer and titrate ordered vasoactive medications to optimize hemodynamic stability  - Assess quality of pulses,  skin color and temperature  - Assess for signs of decreased coronary artery perfusion  - Instruct patient to report change in severity of symptoms  Outcome: Progressing  Goal: Absence of cardiac dysrhythmias or at baseline rhythm  Description: INTERVENTIONS:  - Continuous cardiac monitoring, vital signs, obtain 12 lead EKG if ordered  - Administer antiarrhythmic and heart rate control medications as ordered  - Monitor electrolytes and administer replacement therapy as ordered  Outcome: Progressing     Problem: RESPIRATORY - ADULT  Goal: Achieves optimal ventilation and oxygenation  Description: INTERVENTIONS:  - Assess for changes in respiratory status  - Assess for changes in mentation and behavior  - Position to facilitate oxygenation and minimize respiratory effort  - Oxygen administered by appropriate delivery if ordered  - Initiate smoking cessation education as indicated  - Encourage broncho-pulmonary hygiene including cough, deep breathe, Incentive Spirometry  - Assess the need for suctioning and aspirate as needed  - Assess and instruct to report SOB or any respiratory difficulty  - Respiratory Therapy support as indicated  Outcome: Progressing     Problem: METABOLIC, FLUID AND ELECTROLYTES - ADULT  Goal: Electrolytes maintained within normal limits  Description: INTERVENTIONS:  - Monitor labs and assess patient for signs and symptoms of electrolyte imbalances  - Administer electrolyte replacement as ordered  - Monitor response to electrolyte replacements, including repeat lab results as appropriate  - Instruct patient on fluid and nutrition as appropriate  Outcome: Progressing  Goal: Fluid balance maintained  Description: INTERVENTIONS:  - Monitor labs   - Monitor I/O and WT  - Instruct patient on fluid and nutrition as appropriate  - Assess for signs & symptoms of volume excess or deficit  Outcome: Progressing  Goal: Glucose maintained within target range  Description: INTERVENTIONS:  - Monitor Blood  Glucose as ordered  - Assess for signs and symptoms of hyperglycemia and hypoglycemia  - Administer ordered medications to maintain glucose within target range  - Assess nutritional intake and initiate nutrition service referral as needed  Outcome: Progressing     Problem: MUSCULOSKELETAL - ADULT  Goal: Maintain or return mobility to safest level of function  Description: INTERVENTIONS:  - Assess patient's ability to carry out ADLs; assess patient's baseline for ADL function and identify physical deficits which impact ability to perform ADLs (bathing, care of mouth/teeth, toileting, grooming, dressing, etc.)  - Assess/evaluate cause of self-care deficits   - Assess range of motion  - Assess patient's mobility  - Assess patient's need for assistive devices and provide as appropriate  - Encourage maximum independence but intervene and supervise when necessary  - Involve family in performance of ADLs  - Assess for home care needs following discharge   - Consider OT consult to assist with ADL evaluation and planning for discharge  - Provide patient education as appropriate  Outcome: Progressing  Goal: Maintain proper alignment of affected body part  Description: INTERVENTIONS:  - Support, maintain and protect limb and body alignment  - Provide patient/ family with appropriate education  Outcome: Progressing

## 2025-03-04 ENCOUNTER — OFFICE VISIT (OUTPATIENT)
Dept: PODIATRY | Facility: CLINIC | Age: 55
End: 2025-03-04
Payer: COMMERCIAL

## 2025-03-04 VITALS — HEIGHT: 72 IN | BODY MASS INDEX: 36.16 KG/M2 | WEIGHT: 267 LBS

## 2025-03-04 DIAGNOSIS — Z79.4 TYPE 2 DIABETES MELLITUS WITH DIABETIC NEUROPATHY, WITH LONG-TERM CURRENT USE OF INSULIN (HCC): Primary | ICD-10-CM

## 2025-03-04 DIAGNOSIS — E11.40 TYPE 2 DIABETES MELLITUS WITH DIABETIC NEUROPATHY, WITH LONG-TERM CURRENT USE OF INSULIN (HCC): Primary | ICD-10-CM

## 2025-03-04 PROCEDURE — 99213 OFFICE O/P EST LOW 20 MIN: CPT | Performed by: PODIATRIST

## 2025-03-04 NOTE — PROGRESS NOTES
PATIENT:  Hari Mendez      1970        ASSESSMENT     1. Type 2 diabetes mellitus with diabetic neuropathy, with long-term current use of insulin ([facility])                PLAN  1. Reviewed medical records.  Patient was counseled on the condition and diagnosis.  Educated disease prevention and risks related to diabetes.    2. Educated proper daily foot care and exam.  Instructed proper skin care / protection and footwear.  Instructed to identify any signs of infection and related foot problem.    3. Reviewed the blood work.  HbA1c was 5.8 and instructed management of BS with diet and exercise.  4. He will return in 6 months for diabetic foot exam.      HISTORY OF PRESENT ILLNESS  Patient presents for diabetic foot exam.  He has high risk diabetic foot with history of DFU.  His BS is under control.  No recurring ulcer in his foot.  No acute pedal problems.          PAST MEDICAL HISTORY:  Past Medical History:   Diagnosis Date    A-fib (HCC)     BPH (benign prostatic hyperplasia)     CPAP (continuous positive airway pressure) dependence     Diabetes mellitus (HCC)     GERD (gastroesophageal reflux disease)     History of COVID-19 01/2022    mild s/s but went into a fib    Hyperlipidemia     Hypertension     MRSA infection 2021    back cyst    Sleep apnea     Tailor's bunion of left foot        PAST SURGICAL HISTORY:  Past Surgical History:   Procedure Laterality Date    CARDIAC CATHETERIZATION  2010    [facility]    CARDIAC ELECTROPHYSIOLOGY STUDY AND ABLATION      CARDIOVERSION      CATARACT EXTRACTION Bilateral 07/2021    CLOSURE DELAYED PRIMARY Left 5/1/2022    Procedure: CLOSURE DELAYED PRIMARY VAC REMOVED;  Surgeon: Ralf King DPM;  Location: [facility];  Service: Podiatry    COLONOSCOPY      INCISION AND DRAINAGE OF WOUND Left 4/28/2022    Procedure: foot I&D with vac application; 5TH metatarsal bone biopsy;  Surgeon: Ralf King DPM;  Location: [facility];  Service: Podiatry    NOSE SURGERY      TOE  OSTEOTOMY Left 4/8/2022    Procedure: RESECTION LEFT 5TH METATARSAL;  Surgeon: Ralf King DPM;  Location:  MAIN OR;  Service: Podiatry        ALLERGIES:  Patient has no known allergies.    MEDICATIONS:  Current Outpatient Medications   Medication Sig Dispense Refill    amLODIPine (NORVASC) 5 mg tablet Take 5 mg by mouth 2 (two) times a day      atorvastatin (LIPITOR) 40 mg tablet TAKE 1 TABLET BY MOUTH EVERY DAY AT NIGHT      B-D ULTRAFINE III SHORT PEN 31G X 8 MM MISC USE FOR 2 INJECTIONS PER DAY      BD Pen Needle Joselin 2nd Gen 32G X 4 MM MISC 5 (five) times a day      carvedilol (COREG) 25 mg tablet TAKE 2 TABLETS (50 MG TOTAL) BY MOUTH 2 (TWO) TIMES A DAY WITH MEALS.      chlorthalidone 25 mg tablet Take 50 mg by mouth daily      Continuous Blood Gluc Sensor (FreeStyle Miah 2 Sensor) MISC       fluticasone (FLONASE) 50 mcg/act nasal spray 2 sprays if needed        furosemide (LASIX) 40 mg tablet as needed      Lantus SoloStar 100 units/mL injection pen INJECT 45 UNTIS IN THE MORNING AND 80 UNTITS AT NIGHT      Lyumjev KwikPen 100 UNIT/ML SOPN if needed 20 units for blood sugar spike       metFORMIN (GLUCOPHAGE) 1000 MG tablet Take 1,000 mg by mouth 2 (two) times a day      potassium chloride (K-DUR,KLOR-CON) 20 mEq tablet as needed With Lasix       Pradaxa 150 MG capsu Take 150 mg by mouth 2 (two) times a day      ramipril (ALTACE) 10 MG capsule TAKE 2 TABLETS (20 MG TOTAL) IN THE MORNING AND TAKE 1 TABLET (10 MG TOTAL) IN THE EVENING.      spironolactone (ALDACTONE) 25 mg tablet TAKE 1 TABLET BY MOUTH 1 TIME EACH DAY.      terazosin (HYTRIN) 2 mg capsule 4 mg 2 (two) times a day         No current facility-administered medications for this visit.       SOCIAL HISTORY:  Social History     Socioeconomic History    Marital status: /Civil Union     Spouse name: None    Number of children: None    Years of education: None    Highest education level: None   Occupational History    None   Tobacco Use     Smoking status: Never    Smokeless tobacco: Former     Types: Chew   Vaping Use    Vaping status: Never Used   Substance and Sexual Activity    Alcohol use: Yes     Comment: Rare    Drug use: Never    Sexual activity: None   Other Topics Concern    None   Social History Narrative    None     Social Drivers of Health     Financial Resource Strain: Patient Declined (5/28/2024)    Received from [facility]    Overall Financial Resource Strain (CARDIA)     Difficulty of Paying Living Expenses: Patient declined   Food Insecurity: No Food Insecurity (5/28/2024)    Received from [facility]    Hunger Vital Sign     Worried About Running Out of Food in the Last Year: Never true     Ran Out of Food in the Last Year: Never true   Transportation Needs: No Transportation Needs (5/28/2024)    Received from [facility]    PRAPARE - Transportation     Lack of Transportation (Medical): No     Lack of Transportation (Non-Medical): No   Physical Activity: Not on file   Stress: Not on file   Social Connections: Not on file   Intimate Partner Violence: Not At Risk (5/28/2024)    [facility], [facility]    Humiliation, Afraid, Rape, and Kick questionnaire     Fear of Current or Ex-Partner: No     Emotionally Abused: No     Physically Abused: No     Sexually Abused: No   Housing Stability: Unknown (4/30/2022)    Housing Stability Vital Sign     Unable to Pay for Housing in the Last Year: Not on file     Number of Places Lived in the Last Year: 1     Unstable Housing in the Last Year: No        REVIEW OF SYSTEMS  GENERAL: No fever or chills.    HEART: No chest pain, or palpitation  RESPIRATORY:  No SOB or cough  GI: No Nausea, vomit or diarrhea  NEUROLOGIC: No syncope or acute weakness  MUSCULOSKELETAL: No calf pain or edema.      PHYSICAL EXAMINATION  GENERAL  The patient appears in NAD / non-toxic. Afebrile. VSS    VASCULAR EXAM  Dorsalis  pedis  +2.  Posterior tibial artery  +2.  There is minimal lower extremity edema bilaterally.  No ischemia.      NEUROLOGIC EXAM  AAo X 3.  Sensation is intact to light touch.  Sensation is intact to 10gm monofilament.  No focal neurologic deficit.          DERMATOLOGIC EXAM:   No ulcer.  No cellulitis or abscess noted.  No notable skin lesion.  No rash.      MUSCULOSKELETAL EXAM:   No acute joint pain.  No acute joint swelling or redness.  No acute musculoskeletal problem.  ROM intact.  MMT intact.          Diabetic Foot Exam    Patient's shoes and socks removed.    Right Foot/Ankle   Right Foot Inspection  Skin Exam: skin intact. No erythema, no maceration, no pre-ulcer and no ulcer.     Toe Exam: right toe deformity. No swelling, no tenderness and erythema    Sensory   Vibration: diminished  Proprioception: intact  Monofilament testing: intact    Vascular  Capillary refills: < 3 seconds  The right DP pulse is 2+. The right PT pulse is 2+.     Left Foot/Ankle  Left Foot Inspection  Skin Exam: skin intact. No erythema, no maceration, no pre-ulcer and no ulcer.     Toe Exam: left toe deformity. No swelling, no tenderness and no erythema.     Sensory   Vibration: diminished  Proprioception: intact  Monofilament testing: intact    Vascular  Capillary refills: < 3 seconds  The left DP pulse is 2+. The left PT pulse is 2+.     Assign Risk Category  Deformity present  No loss of protective sensation  No weak pulses  Risk: 3

## 2025-03-04 NOTE — PATIENT INSTRUCTIONS
"Patient Education     Foot care for people with diabetes   The Basics   Written by the doctors and editors at [facility]   Why is foot care important if I have diabetes? -- Diabetes can cause nerve damage if your blood sugar is high for a long time. The medical term for this is \"diabetic neuropathy.\"  If you have problems with the nerves in your feet, you might not be able to feel pain in your foot. Normally, people feel pain when they get a cut or a blister on their foot. The pain tells them that they need to treat their cut so it can heal. But people with nerve damage might not feel any pain when their feet get hurt. They might not even know that they have a cut, so they might not treat it. Problems that aren't treated right away can get much worse. For example, an untreated cut can get infected and turn into an open sore.  High blood sugar can also damage blood vessels and decrease blood flow to your feet. This can weaken your skin and make wounds take longer to heal. You are also more likely to get an infection if you have high blood sugar.  How do I take care of my feet? -- Taking good care of your feet can help prevent foot problems. You should:   Wash your feet every day with soap and warm water. Pat your feet dry, and be sure to dry the skin between your toes.   Keep your feet moisturized. Put lotion on the tops and bottoms of your feet, but not between your toes.   Check your feet every day (figure 1). Look for cuts, blisters, redness, or swelling. Use a mirror, or ask someone to help you check the bottoms of your feet. Check all parts of the foot, especially between the toes. Look for broken skin, ulcers, blisters, or redness.   Trim your toenails straight across when needed (figure 2). Do not cut the corners of your toenails. File rough edges. Do not cut your cuticles. Ask for help if you cannot see well or have problems reaching your feet.   Ask your doctor or nurse to check your feet at each visit. Take " your shoes and socks off for these checks.   See a foot care provider (such as a podiatrist) if you have an ingrown toenail, corn, or callus. Do not try to remove corns and calluses yourself.  How do I protect my feet from injury? -- There are several ways to protect your feet. You can:   Wear shoes and socks at all times, even at home. Do not walk barefoot. Wear swim shoes if you go to the beach or a swimming pool.   Choose shoes that fit well. They should not be not too tight or too loose. Your shoes should have plenty of room for your toes (figure 3). Your doctor might give you a prescription for special shoes. Check to see if they are covered by your insurance.   Check your shoes each time before you put them on to make sure that the lining is smooth. Also check to make sure that there is nothing inside the shoes before putting them on.   Do not wear shoes that expose any part of the foot, like sandals, thongs, or clogs.   Wear cotton socks that fit loosely. Do not wear shoes without socks.   Protect your feet from heat and cold. Test bath water before putting your feet in it to make sure that it is not too hot. Do not walk barefoot on hot ground. Take extra care when going outside in the cold and wear warm socks.  What else should I know? -- You can lower your risk for foot problems by keeping your blood sugar levels as close to your goal as possible. Other things you can do include:   Move your ankles and toes often to help with blood flow. You can wear a support stocking to help with swelling.   Walk often. Regular walking helps blood flow.   If you smoke, try to quit. Your doctor or nurse can help. Smoking causes poor blood flow to your feet and can damage your nerves.  When should I call the doctor? -- Call your doctor or nurse for advice if you have:   A fever of 100.4°F (38°C) or higher, chills, or a wound that will not heal   Swelling, redness, warmth around a wound, a foul smell coming from a wound, or  yellowish, greenish, or bloody discharge   Sores or blisters on your feet that hurt more or less than you would expect   Numbness or tingling in your foot or leg   Corns, calluses, blisters, or new sores on your foot   Very dry, scaly, or cracked skin on your feet   Changes in the way your foot joints or arch look  All topics are updated as new evidence becomes available and our peer review process is complete.  This topic retrieved from [employer] on: Mar 13, 2024.  Topic 637994 Version 2.0  Release: 32.2.4 - C32.71  © 2024 UpToDate, Inc. and/or its affiliates. All rights reserved.  figure 1: Foot check for people with diabetes     People with diabetes should check both of their feet every day. It is important to check your feet all over, including in between your toes. If you can't see the bottom of your foot, use a mirror or ask another person to check for you. Let your doctor or nurse know if you find any:  Redness   Cuts or cracks in the skin   Blisters   Swelling   Graphic 03066 Version 3.0  figure 2: Trim your toenails     Trim your toenails straight across and smooth them with a nail file.  Graphic 80029 Version 2.0  figure 3: Correct shoe shape     Choose shoes that fit the right way and are not too tight or too loose. Your shoes should have plenty of room for your toes.  Graphic 97119 Version 2.0  Consumer Information Use and Disclaimer   Disclaimer: This generalized information is a limited summary of diagnosis, treatment, and/or medication information. It is not meant to be comprehensive and should be used as a tool to help the user understand and/or assess potential diagnostic and treatment options. It does NOT include all information about conditions, treatments, medications, side effects, or risks that may apply to a specific patient. It is not intended to be medical advice or a substitute for the medical advice, diagnosis, or treatment of a health care provider based on the health care provider's  examination and assessment of a patient's specific and unique circumstances. Patients must speak with a health care provider for complete information about their health, medical questions, and treatment options, including any risks or benefits regarding use of medications. This information does not endorse any treatments or medications as safe, effective, or approved for treating a specific patient. UpToDate, Inc. and its affiliates disclaim any warranty or liability relating to this information or the use thereof.The use of this information is governed by the Terms of Use, available at https://www.[employer].com/en/know/clinical-effectiveness-terms. 2024© UpToDate, Inc. and its affiliates and/or licensors. All rights reserved.  Copyright   © 2024 UpToDate, Inc. and/or its affiliates. All rights reserved.

## (undated) DEVICE — GAUZE SPONGES,16 PLY: Brand: CURITY

## (undated) DEVICE — GLOVE SRG BIOGEL 7.5

## (undated) DEVICE — CUFF TOURNIQUET 18 X 4 IN QUICK CONNECT DISP 1 BLADDER

## (undated) DEVICE — ACE WRAP 4 IN UNSTERILE

## (undated) DEVICE — BLADE SAGITTAL 25.6 X 9.5MM

## (undated) DEVICE — GLOVE SRG BIOGEL 7

## (undated) DEVICE — SYRINGE 10ML LL

## (undated) DEVICE — THE SIMPULSE SOLO SYSTEM WITH ULTREX RETRACTABLE SPLASH SHIELD TIP: Brand: SIMPULSE SOLO

## (undated) DEVICE — CURITY NON-ADHERENT STRIPS: Brand: CURITY

## (undated) DEVICE — SUT ETHILON 3-0 FS-1 18 IN 663G

## (undated) DEVICE — BETHLEHEM UNIVERSAL  MIONR EXT: Brand: CARDINAL HEALTH

## (undated) DEVICE — SUT VICRYL 4-0 PS-2 27 IN J426H

## (undated) DEVICE — BANDAGE, ESMARK LF STR 4"X9'(20/CS): Brand: CYPRESS

## (undated) DEVICE — PAD GROUNDING ADULT

## (undated) DEVICE — ABDOMINAL PAD: Brand: DERMACEA

## (undated) DEVICE — INTENDED FOR TISSUE SEPARATION, AND OTHER PROCEDURES THAT REQUIRE A SHARP SURGICAL BLADE TO PUNCTURE OR CUT.: Brand: BARD-PARKER SAFETY BLADES SIZE 15, STERILE

## (undated) DEVICE — GLOVE INDICATOR PI UNDERGLOVE SZ 7.5 BLUE

## (undated) DEVICE — SUT ETHILON 3-0 PS-1 18 IN 1663H

## (undated) DEVICE — NEEDLE 18 G X 1 1/2

## (undated) DEVICE — SPONGE PVP SCRUB WING STERILE

## (undated) DEVICE — CHLORAPREP HI-LITE 26ML ORANGE

## (undated) DEVICE — BONE WAX WHITE: Brand: BONE WAX WHITE

## (undated) DEVICE — SUPER SPONGES,MEDIUM: Brand: KERLIX

## (undated) DEVICE — VAC DRESSING SENSATRAC SMALL

## (undated) DEVICE — SPONGE STICK WITH PVP-I: Brand: KENDALL

## (undated) DEVICE — PLUMEPEN PRO 10FT

## (undated) DEVICE — UNIVERSAL MAJOR EXTREMITY,KIT: Brand: CARDINAL HEALTH

## (undated) DEVICE — NEEDLE 25G X 1 1/2

## (undated) DEVICE — POV-IOD SOLUTION 4OZ BT

## (undated) DEVICE — 10FR FRAZIER SUCTION HANDLE: Brand: CARDINAL HEALTH

## (undated) DEVICE — STRETCH BANDAGE: Brand: CURITY

## (undated) DEVICE — INTENDED FOR TISSUE SEPARATION, AND OTHER PROCEDURES THAT REQUIRE A SHARP SURGICAL BLADE TO PUNCTURE OR CUT.: Brand: BARD-PARKER ® CARBON RIB-BACK BLADES

## (undated) DEVICE — KERLIX BANDAGE ROLL: Brand: KERLIX